# Patient Record
Sex: MALE | Race: BLACK OR AFRICAN AMERICAN | Employment: FULL TIME | URBAN - METROPOLITAN AREA
[De-identification: names, ages, dates, MRNs, and addresses within clinical notes are randomized per-mention and may not be internally consistent; named-entity substitution may affect disease eponyms.]

---

## 2022-02-22 ENCOUNTER — TELEPHONE (OUTPATIENT)
Dept: FAMILY MEDICINE CLINIC | Age: 47
End: 2022-02-22

## 2022-02-22 NOTE — TELEPHONE ENCOUNTER
----- Message from Aristeo Gwen sent at 2/22/2022 11:00 AM EST -----  Subject: Message to Provider    QUESTIONS  Information for Provider? Elmira (Spouse scheduled appt for pt on March   10, 2022 at 1:45, pt is requesting for a blood work to be done before or   during the appt.   ---------------------------------------------------------------------------  --------------  [address]  What is the best way for the office to contact you? OK to leave message on   voicemail  Preferred Call Back Phone Number? [phone]  ---------------------------------------------------------------------------  --------------  SCRIPT ANSWERS  Relationship to Patient? Other  Representative Name? Elmira (Spouse)   Is the Representative on the appropriate HIPAA document in Epic?  Yes

## 2022-02-28 ENCOUNTER — HOSPITAL ENCOUNTER (EMERGENCY)
Age: 47
Discharge: HOME | DRG: 291 | End: 2022-02-28
Payer: COMMERCIAL

## 2022-02-28 ENCOUNTER — HOSPITAL ENCOUNTER (INPATIENT)
Age: 47
LOS: 3 days | Discharge: ANOTHER HOSPITAL | DRG: 291 | End: 2022-03-03
Attending: EMERGENCY MEDICINE | Admitting: INTERNAL MEDICINE
Payer: COMMERCIAL

## 2022-02-28 ENCOUNTER — APPOINTMENT (OUTPATIENT)
Dept: GENERAL RADIOLOGY | Age: 47
DRG: 291 | End: 2022-02-28
Payer: COMMERCIAL

## 2022-02-28 VITALS
SYSTOLIC BLOOD PRESSURE: 215 MMHG | DIASTOLIC BLOOD PRESSURE: 133 MMHG | WEIGHT: 300 LBS | OXYGEN SATURATION: 96 % | RESPIRATION RATE: 20 BRPM | TEMPERATURE: 97.2 F | HEART RATE: 99 BPM | BODY MASS INDEX: 42 KG/M2 | HEIGHT: 71 IN

## 2022-02-28 DIAGNOSIS — I50.9 CONGESTIVE HEART FAILURE, UNSPECIFIED HF CHRONICITY, UNSPECIFIED HEART FAILURE TYPE (HCC): ICD-10-CM

## 2022-02-28 DIAGNOSIS — R35.0 URINARY FREQUENCY: ICD-10-CM

## 2022-02-28 DIAGNOSIS — R03.0 ELEVATED BLOOD PRESSURE READING: Primary | ICD-10-CM

## 2022-02-28 DIAGNOSIS — N28.9 RENAL INSUFFICIENCY: ICD-10-CM

## 2022-02-28 DIAGNOSIS — R06.02 SHORTNESS OF BREATH: ICD-10-CM

## 2022-02-28 DIAGNOSIS — I16.0 HYPERTENSIVE URGENCY: Primary | ICD-10-CM

## 2022-02-28 LAB
ALBUMIN SERPL-MCNC: 4.1 G/DL (ref 3.5–5.2)
ALP BLD-CCNC: 69 U/L (ref 40–129)
ALT SERPL-CCNC: 28 U/L (ref 0–40)
ANION GAP SERPL CALCULATED.3IONS-SCNC: 10 MMOL/L (ref 7–16)
APTT: 27 SEC (ref 24.5–35.1)
AST SERPL-CCNC: 24 U/L (ref 0–39)
BASOPHILS ABSOLUTE: 0.02 E9/L (ref 0–0.2)
BASOPHILS RELATIVE PERCENT: 0.3 % (ref 0–2)
BILIRUB SERPL-MCNC: 0.5 MG/DL (ref 0–1.2)
BILIRUBIN URINE: NEGATIVE
BLOOD, URINE: NEGATIVE
BUN BLDV-MCNC: 14 MG/DL (ref 6–20)
CALCIUM SERPL-MCNC: 9.1 MG/DL (ref 8.6–10.2)
CHLORIDE BLD-SCNC: 103 MMOL/L (ref 98–107)
CLARITY: CLEAR
CO2: 30 MMOL/L (ref 22–29)
COLOR: YELLOW
CREAT SERPL-MCNC: 1.5 MG/DL (ref 0.7–1.2)
EOSINOPHILS ABSOLUTE: 0.12 E9/L (ref 0.05–0.5)
EOSINOPHILS RELATIVE PERCENT: 2 % (ref 0–6)
GFR AFRICAN AMERICAN: >60
GFR NON-AFRICAN AMERICAN: >60 ML/MIN/1.73
GLUCOSE BLD-MCNC: 124 MG/DL (ref 74–99)
GLUCOSE URINE: NEGATIVE MG/DL
HCT VFR BLD CALC: 48.3 % (ref 37–54)
HEMOGLOBIN: 15.8 G/DL (ref 12.5–16.5)
IMMATURE GRANULOCYTES #: 0.02 E9/L
IMMATURE GRANULOCYTES %: 0.3 % (ref 0–5)
INR BLD: 1.2
KETONES, URINE: NEGATIVE MG/DL
LEUKOCYTE ESTERASE, URINE: NEGATIVE
LYMPHOCYTES ABSOLUTE: 1.13 E9/L (ref 1.5–4)
LYMPHOCYTES RELATIVE PERCENT: 18.6 % (ref 20–42)
MAGNESIUM: 2 MG/DL (ref 1.6–2.6)
MCH RBC QN AUTO: 26.8 PG (ref 26–35)
MCHC RBC AUTO-ENTMCNC: 32.7 % (ref 32–34.5)
MCV RBC AUTO: 82 FL (ref 80–99.9)
MONOCYTES ABSOLUTE: 0.66 E9/L (ref 0.1–0.95)
MONOCYTES RELATIVE PERCENT: 10.8 % (ref 2–12)
NEUTROPHILS ABSOLUTE: 4.14 E9/L (ref 1.8–7.3)
NEUTROPHILS RELATIVE PERCENT: 68 % (ref 43–80)
NITRITE, URINE: NEGATIVE
PDW BLD-RTO: 15.9 FL (ref 11.5–15)
PH UA: 6.5 (ref 5–9)
PLATELET # BLD: 255 E9/L (ref 130–450)
PMV BLD AUTO: 10 FL (ref 7–12)
POTASSIUM SERPL-SCNC: 3.4 MMOL/L (ref 3.5–5)
PRO-BNP: 1313 PG/ML (ref 0–125)
PROTEIN UA: NEGATIVE MG/DL
PROTHROMBIN TIME: 13.6 SEC (ref 9.3–12.4)
RBC # BLD: 5.89 E12/L (ref 3.8–5.8)
SODIUM BLD-SCNC: 143 MMOL/L (ref 132–146)
SPECIFIC GRAVITY UA: 1.02 (ref 1–1.03)
TOTAL PROTEIN: 7.3 G/DL (ref 6.4–8.3)
TROPONIN, HIGH SENSITIVITY: 46 NG/L (ref 0–11)
UROBILINOGEN, URINE: 0.2 E.U./DL
WBC # BLD: 6.1 E9/L (ref 4.5–11.5)

## 2022-02-28 PROCEDURE — 2500000003 HC RX 250 WO HCPCS: Performed by: EMERGENCY MEDICINE

## 2022-02-28 PROCEDURE — 85610 PROTHROMBIN TIME: CPT

## 2022-02-28 PROCEDURE — 99202 OFFICE O/P NEW SF 15 MIN: CPT

## 2022-02-28 PROCEDURE — 84484 ASSAY OF TROPONIN QUANT: CPT

## 2022-02-28 PROCEDURE — 93005 ELECTROCARDIOGRAM TRACING: CPT | Performed by: EMERGENCY MEDICINE

## 2022-02-28 PROCEDURE — 99285 EMERGENCY DEPT VISIT HI MDM: CPT

## 2022-02-28 PROCEDURE — 83880 ASSAY OF NATRIURETIC PEPTIDE: CPT

## 2022-02-28 PROCEDURE — 85730 THROMBOPLASTIN TIME PARTIAL: CPT

## 2022-02-28 PROCEDURE — 99223 1ST HOSP IP/OBS HIGH 75: CPT | Performed by: INTERNAL MEDICINE

## 2022-02-28 PROCEDURE — 96375 TX/PRO/DX INJ NEW DRUG ADDON: CPT

## 2022-02-28 PROCEDURE — 85025 COMPLETE CBC W/AUTO DIFF WBC: CPT

## 2022-02-28 PROCEDURE — 6370000000 HC RX 637 (ALT 250 FOR IP): Performed by: INTERNAL MEDICINE

## 2022-02-28 PROCEDURE — 96374 THER/PROPH/DIAG INJ IV PUSH: CPT

## 2022-02-28 PROCEDURE — 71046 X-RAY EXAM CHEST 2 VIEWS: CPT

## 2022-02-28 PROCEDURE — 36415 COLL VENOUS BLD VENIPUNCTURE: CPT

## 2022-02-28 PROCEDURE — 81003 URINALYSIS AUTO W/O SCOPE: CPT

## 2022-02-28 PROCEDURE — 6360000002 HC RX W HCPCS: Performed by: EMERGENCY MEDICINE

## 2022-02-28 PROCEDURE — 83735 ASSAY OF MAGNESIUM: CPT

## 2022-02-28 PROCEDURE — 80053 COMPREHEN METABOLIC PANEL: CPT

## 2022-02-28 PROCEDURE — 6370000000 HC RX 637 (ALT 250 FOR IP): Performed by: EMERGENCY MEDICINE

## 2022-02-28 PROCEDURE — 1200000000 HC SEMI PRIVATE

## 2022-02-28 RX ORDER — SODIUM CHLORIDE 0.9 % (FLUSH) 0.9 %
10 SYRINGE (ML) INJECTION PRN
Status: DISCONTINUED | OUTPATIENT
Start: 2022-02-28 | End: 2022-03-03 | Stop reason: HOSPADM

## 2022-02-28 RX ORDER — LABETALOL HYDROCHLORIDE 5 MG/ML
10 INJECTION, SOLUTION INTRAVENOUS ONCE
Status: COMPLETED | OUTPATIENT
Start: 2022-02-28 | End: 2022-02-28

## 2022-02-28 RX ORDER — ACETAMINOPHEN 650 MG/1
650 SUPPOSITORY RECTAL EVERY 6 HOURS PRN
Status: DISCONTINUED | OUTPATIENT
Start: 2022-02-28 | End: 2022-03-03 | Stop reason: HOSPADM

## 2022-02-28 RX ORDER — FUROSEMIDE 10 MG/ML
40 INJECTION INTRAMUSCULAR; INTRAVENOUS ONCE
Status: COMPLETED | OUTPATIENT
Start: 2022-02-28 | End: 2022-02-28

## 2022-02-28 RX ORDER — PROMETHAZINE HYDROCHLORIDE 25 MG/1
12.5 TABLET ORAL EVERY 6 HOURS PRN
Status: DISCONTINUED | OUTPATIENT
Start: 2022-02-28 | End: 2022-03-03 | Stop reason: HOSPADM

## 2022-02-28 RX ORDER — HYDRALAZINE HYDROCHLORIDE 20 MG/ML
10 INJECTION INTRAMUSCULAR; INTRAVENOUS ONCE
Status: COMPLETED | OUTPATIENT
Start: 2022-02-28 | End: 2022-02-28

## 2022-02-28 RX ORDER — ONDANSETRON 2 MG/ML
4 INJECTION INTRAMUSCULAR; INTRAVENOUS EVERY 6 HOURS PRN
Status: DISCONTINUED | OUTPATIENT
Start: 2022-02-28 | End: 2022-03-03 | Stop reason: HOSPADM

## 2022-02-28 RX ORDER — POLYETHYLENE GLYCOL 3350 17 G/17G
17 POWDER, FOR SOLUTION ORAL DAILY PRN
Status: DISCONTINUED | OUTPATIENT
Start: 2022-02-28 | End: 2022-03-03 | Stop reason: HOSPADM

## 2022-02-28 RX ORDER — CLONIDINE HYDROCHLORIDE 0.2 MG/1
0.2 TABLET ORAL EVERY 8 HOURS PRN
Status: DISCONTINUED | OUTPATIENT
Start: 2022-02-28 | End: 2022-03-02

## 2022-02-28 RX ORDER — SODIUM CHLORIDE 9 MG/ML
25 INJECTION, SOLUTION INTRAVENOUS PRN
Status: DISCONTINUED | OUTPATIENT
Start: 2022-02-28 | End: 2022-03-03 | Stop reason: HOSPADM

## 2022-02-28 RX ORDER — HYDRALAZINE HYDROCHLORIDE 20 MG/ML
20 INJECTION INTRAMUSCULAR; INTRAVENOUS EVERY 6 HOURS PRN
Status: DISCONTINUED | OUTPATIENT
Start: 2022-02-28 | End: 2022-03-03 | Stop reason: HOSPADM

## 2022-02-28 RX ORDER — AMLODIPINE BESYLATE 5 MG/1
10 TABLET ORAL DAILY
Status: DISCONTINUED | OUTPATIENT
Start: 2022-02-28 | End: 2022-03-01

## 2022-02-28 RX ORDER — SODIUM CHLORIDE 0.9 % (FLUSH) 0.9 %
10 SYRINGE (ML) INJECTION EVERY 12 HOURS SCHEDULED
Status: DISCONTINUED | OUTPATIENT
Start: 2022-02-28 | End: 2022-03-03 | Stop reason: HOSPADM

## 2022-02-28 RX ORDER — ACETAMINOPHEN 325 MG/1
650 TABLET ORAL EVERY 6 HOURS PRN
Status: DISCONTINUED | OUTPATIENT
Start: 2022-02-28 | End: 2022-03-03 | Stop reason: HOSPADM

## 2022-02-28 RX ADMIN — LABETALOL HYDROCHLORIDE 10 MG: 5 INJECTION INTRAVENOUS at 21:32

## 2022-02-28 RX ADMIN — NITROGLYCERIN 1 INCH: 20 OINTMENT TOPICAL at 23:57

## 2022-02-28 RX ADMIN — HYDRALAZINE HYDROCHLORIDE 10 MG: 20 INJECTION INTRAMUSCULAR; INTRAVENOUS at 22:11

## 2022-02-28 RX ADMIN — FUROSEMIDE 40 MG: 10 INJECTION, SOLUTION INTRAMUSCULAR; INTRAVENOUS at 23:57

## 2022-02-28 RX ADMIN — AMLODIPINE BESYLATE 10 MG: 5 TABLET ORAL at 23:57

## 2022-02-28 ASSESSMENT — ENCOUNTER SYMPTOMS
BACK PAIN: 0
SHORTNESS OF BREATH: 1
WHEEZING: 0
NAUSEA: 0
COUGH: 1
CHEST TIGHTNESS: 0
VOMITING: 0
SINUS PRESSURE: 0
DIARRHEA: 0
ABDOMINAL PAIN: 0
SORE THROAT: 0

## 2022-02-28 ASSESSMENT — PAIN DESCRIPTION - DESCRIPTORS: DESCRIPTORS: OTHER (COMMENT)

## 2022-02-28 ASSESSMENT — PAIN DESCRIPTION - FREQUENCY: FREQUENCY: CONTINUOUS

## 2022-02-28 ASSESSMENT — PAIN DESCRIPTION - LOCATION: LOCATION: ABDOMEN

## 2022-02-28 ASSESSMENT — PAIN SCALES - GENERAL: PAINLEVEL_OUTOF10: 4

## 2022-02-28 ASSESSMENT — PAIN - FUNCTIONAL ASSESSMENT: PAIN_FUNCTIONAL_ASSESSMENT: 0-10

## 2022-02-28 ASSESSMENT — PAIN DESCRIPTION - PAIN TYPE: TYPE: ACUTE PAIN

## 2022-02-28 ASSESSMENT — PAIN DESCRIPTION - ONSET: ONSET: GRADUAL

## 2022-02-28 ASSESSMENT — PAIN DESCRIPTION - PROGRESSION: CLINICAL_PROGRESSION: NOT CHANGED

## 2022-03-01 PROBLEM — I10 HYPERTENSION: Status: ACTIVE | Noted: 2022-03-01

## 2022-03-01 PROBLEM — I50.41 ACUTE COMBINED SYSTOLIC AND DIASTOLIC CONGESTIVE HEART FAILURE (HCC): Status: ACTIVE | Noted: 2022-03-01

## 2022-03-01 LAB
ALBUMIN SERPL-MCNC: 4 G/DL (ref 3.5–5.2)
ALP BLD-CCNC: 58 U/L (ref 40–129)
ALT SERPL-CCNC: 29 U/L (ref 0–40)
ANION GAP SERPL CALCULATED.3IONS-SCNC: 9 MMOL/L (ref 7–16)
AST SERPL-CCNC: 47 U/L (ref 0–39)
BASOPHILS ABSOLUTE: 0.02 E9/L (ref 0–0.2)
BASOPHILS RELATIVE PERCENT: 0.4 % (ref 0–2)
BILIRUB SERPL-MCNC: 0.8 MG/DL (ref 0–1.2)
BUN BLDV-MCNC: 12 MG/DL (ref 6–20)
CALCIUM SERPL-MCNC: 9 MG/DL (ref 8.6–10.2)
CHLORIDE BLD-SCNC: 100 MMOL/L (ref 98–107)
CO2: 31 MMOL/L (ref 22–29)
CREAT SERPL-MCNC: 1.2 MG/DL (ref 0.7–1.2)
EKG ATRIAL RATE: 87 BPM
EKG P AXIS: 42 DEGREES
EKG P-R INTERVAL: 208 MS
EKG Q-T INTERVAL: 400 MS
EKG QRS DURATION: 98 MS
EKG QTC CALCULATION (BAZETT): 481 MS
EKG R AXIS: 70 DEGREES
EKG T AXIS: 11 DEGREES
EKG VENTRICULAR RATE: 87 BPM
EOSINOPHILS ABSOLUTE: 0.12 E9/L (ref 0.05–0.5)
EOSINOPHILS RELATIVE PERCENT: 2.4 % (ref 0–6)
GFR AFRICAN AMERICAN: >60
GFR NON-AFRICAN AMERICAN: >60 ML/MIN/1.73
GLUCOSE BLD-MCNC: 118 MG/DL (ref 74–99)
HCT VFR BLD CALC: 47.5 % (ref 37–54)
HEMOGLOBIN: 15.5 G/DL (ref 12.5–16.5)
IMMATURE GRANULOCYTES #: 0.02 E9/L
IMMATURE GRANULOCYTES %: 0.4 % (ref 0–5)
LYMPHOCYTES ABSOLUTE: 0.77 E9/L (ref 1.5–4)
LYMPHOCYTES RELATIVE PERCENT: 15.7 % (ref 20–42)
MCH RBC QN AUTO: 27 PG (ref 26–35)
MCHC RBC AUTO-ENTMCNC: 32.6 % (ref 32–34.5)
MCV RBC AUTO: 82.6 FL (ref 80–99.9)
MONOCYTES ABSOLUTE: 0.56 E9/L (ref 0.1–0.95)
MONOCYTES RELATIVE PERCENT: 11.4 % (ref 2–12)
NEUTROPHILS ABSOLUTE: 3.42 E9/L (ref 1.8–7.3)
NEUTROPHILS RELATIVE PERCENT: 69.7 % (ref 43–80)
PDW BLD-RTO: 15.9 FL (ref 11.5–15)
PLATELET # BLD: 268 E9/L (ref 130–450)
PMV BLD AUTO: 10.3 FL (ref 7–12)
POTASSIUM REFLEX MAGNESIUM: 4.2 MMOL/L (ref 3.5–5)
RBC # BLD: 5.75 E12/L (ref 3.8–5.8)
SODIUM BLD-SCNC: 140 MMOL/L (ref 132–146)
TOTAL PROTEIN: 7.2 G/DL (ref 6.4–8.3)
TROPONIN, HIGH SENSITIVITY: 42 NG/L (ref 0–11)
WBC # BLD: 4.9 E9/L (ref 4.5–11.5)

## 2022-03-01 PROCEDURE — 99232 SBSQ HOSP IP/OBS MODERATE 35: CPT | Performed by: INTERNAL MEDICINE

## 2022-03-01 PROCEDURE — 84484 ASSAY OF TROPONIN QUANT: CPT

## 2022-03-01 PROCEDURE — 6370000000 HC RX 637 (ALT 250 FOR IP): Performed by: INTERNAL MEDICINE

## 2022-03-01 PROCEDURE — 1200000000 HC SEMI PRIVATE

## 2022-03-01 PROCEDURE — 6360000002 HC RX W HCPCS: Performed by: INTERNAL MEDICINE

## 2022-03-01 PROCEDURE — APPSS60 APP SPLIT SHARED TIME 46-60 MINUTES: Performed by: PHYSICIAN ASSISTANT

## 2022-03-01 PROCEDURE — 2580000003 HC RX 258: Performed by: INTERNAL MEDICINE

## 2022-03-01 PROCEDURE — 36415 COLL VENOUS BLD VENIPUNCTURE: CPT

## 2022-03-01 PROCEDURE — 80053 COMPREHEN METABOLIC PANEL: CPT

## 2022-03-01 PROCEDURE — 93018 CV STRESS TEST I&R ONLY: CPT | Performed by: INTERNAL MEDICINE

## 2022-03-01 PROCEDURE — 6360000002 HC RX W HCPCS: Performed by: PHYSICIAN ASSISTANT

## 2022-03-01 PROCEDURE — 85025 COMPLETE CBC W/AUTO DIFF WBC: CPT

## 2022-03-01 PROCEDURE — 99254 IP/OBS CNSLTJ NEW/EST MOD 60: CPT | Performed by: INTERNAL MEDICINE

## 2022-03-01 RX ORDER — CARVEDILOL 6.25 MG/1
6.25 TABLET ORAL 2 TIMES DAILY WITH MEALS
Status: DISCONTINUED | OUTPATIENT
Start: 2022-03-01 | End: 2022-03-02

## 2022-03-01 RX ORDER — NITROGLYCERIN 0.4 MG/1
0.4 TABLET SUBLINGUAL EVERY 5 MIN PRN
Status: DISCONTINUED | OUTPATIENT
Start: 2022-03-01 | End: 2022-03-03 | Stop reason: HOSPADM

## 2022-03-01 RX ORDER — LISINOPRIL 5 MG/1
2.5 TABLET ORAL DAILY
Status: DISCONTINUED | OUTPATIENT
Start: 2022-03-01 | End: 2022-03-01

## 2022-03-01 RX ORDER — AMLODIPINE BESYLATE 5 MG/1
5 TABLET ORAL DAILY
Status: DISCONTINUED | OUTPATIENT
Start: 2022-03-01 | End: 2022-03-02

## 2022-03-01 RX ADMIN — Medication 10 ML: at 09:00

## 2022-03-01 RX ADMIN — AMLODIPINE BESYLATE 5 MG: 5 TABLET ORAL at 08:32

## 2022-03-01 RX ADMIN — ENOXAPARIN SODIUM 30 MG: 100 INJECTION SUBCUTANEOUS at 08:31

## 2022-03-01 RX ADMIN — FUROSEMIDE 20 MG: 10 INJECTION, SOLUTION INTRAMUSCULAR; INTRAVENOUS at 20:30

## 2022-03-01 RX ADMIN — CARVEDILOL 6.25 MG: 6.25 TABLET, FILM COATED ORAL at 12:49

## 2022-03-01 RX ADMIN — LISINOPRIL 2.5 MG: 5 TABLET ORAL at 08:32

## 2022-03-01 RX ADMIN — CARVEDILOL 6.25 MG: 6.25 TABLET, FILM COATED ORAL at 16:46

## 2022-03-01 RX ADMIN — FUROSEMIDE 40 MG: 10 INJECTION, SOLUTION INTRAMUSCULAR; INTRAVENOUS at 08:31

## 2022-03-01 RX ADMIN — Medication 10 ML: at 20:30

## 2022-03-01 RX ADMIN — ENOXAPARIN SODIUM 40 MG: 100 INJECTION SUBCUTANEOUS at 20:30

## 2022-03-01 RX ADMIN — Medication 10 ML: at 00:03

## 2022-03-01 RX ADMIN — HYDRALAZINE HYDROCHLORIDE 20 MG: 20 INJECTION INTRAMUSCULAR; INTRAVENOUS at 00:57

## 2022-03-01 ASSESSMENT — PAIN SCALES - GENERAL: PAINLEVEL_OUTOF10: 0

## 2022-03-01 NOTE — PROGRESS NOTES
Dr. Aileen Fitch MD,    Your patient is on a medication that requires a renal and/or weight dose adjustment. Renal/Body Weight Function Assessment:    Date Body Weight IBW  Adjusted BW SCr  CrCl Dialysis status   2/28/2022 136.1 kg Ideal body weight: 77.6 kg (171 lb 1.2 oz)  Adjusted ideal body weight: 101 kg (222 lb 10.3 oz) Serum creatinine: 1.5 mg/dL (H) 02/28/22 2111  Estimated creatinine clearance: 88 mL/min (A) N/a       Pharmacy has dose-adjusted the following medication(s):    Date Previous Order Adjusted Order   2/28/2022 Lovenox 40 mg Daily Lovenox 30 mg BID       These changes were made per protocol according to the [address] Renal Dosing Policy/ [address] Pharmacist Anticoagulant Review. *Please note this dose may need readjusted if your patient's condition changes. Please contact pharmacy with any questions regarding these changes.     Jelani Shaw, [facility]  2/28/2022  10:56 PM

## 2022-03-01 NOTE — ED PROVIDER NOTES
Chief complaint:  Hypertension, short of breath    HPI history provided by the patient and significant other  The patient comes in complaining of shortness of breath worsened with exertion for days to weeks. No associated chest pain or palpitations. No fevers, sweats or chills. Mild intermittent cough. No phlegm production. No runny nose or nasal congestion. No fevers, chills or sweats and no abdominal pain, nausea or vomiting or diarrhea. Having some mild general leg edema. No unilateral leg swelling and no calf pain. No lightheadedness or syncope. No treatment prior to arrival.  Has a history of hypertension in the past, was on medications and at one point over a decade ago had his blood pressure medications stopped and states that he thought his blood pressure had stayed down afterwards but never actually followed up with a physician following this so he has not had blood pressure checks for the last decade or longer. Has not followed with a doctor since the early 2000's. Review of Systems   Constitutional: Negative for chills, diaphoresis, fatigue and fever. HENT: Negative for congestion, sinus pressure and sore throat. Respiratory: Positive for cough and shortness of breath. Negative for chest tightness and wheezing. Cardiovascular: Positive for leg swelling. Negative for chest pain and palpitations. Gastrointestinal: Negative for abdominal pain, diarrhea, nausea and vomiting. Genitourinary: Negative for dysuria, flank pain and frequency. Musculoskeletal: Negative for arthralgias, back pain, gait problem, joint swelling, myalgias, neck pain and neck stiffness. Skin: Negative for rash and wound. Neurological: Negative for dizziness, seizures, syncope, facial asymmetry, weakness, light-headedness, numbness and headaches. All other systems reviewed and are negative. Physical Exam  Vitals and nursing note reviewed. Constitutional:       General: He is awake.  He is not in acute distress. Appearance: He is well-developed. He is morbidly obese. He is not ill-appearing, toxic-appearing or diaphoretic. Interventions: He is not intubated. HENT:      Head: Normocephalic and atraumatic. Eyes:      General: No scleral icterus. Pupils: Pupils are equal, round, and reactive to light. Cardiovascular:      Rate and Rhythm: Normal rate and regular rhythm. Heart sounds: Normal heart sounds. No murmur heard. Pulmonary:      Effort: Pulmonary effort is normal. No tachypnea, accessory muscle usage or respiratory distress. He is not intubated. Breath sounds: Normal breath sounds. No stridor, decreased air movement or transmitted upper airway sounds. No decreased breath sounds, wheezing, rhonchi or rales. Chest:      Chest wall: No tenderness. Abdominal:      General: Bowel sounds are normal. There is no distension. Palpations: Abdomen is soft. Tenderness: There is no abdominal tenderness. There is no right CVA tenderness, left CVA tenderness, guarding or rebound. Musculoskeletal:      Cervical back: Normal range of motion and neck supple. No signs of trauma or rigidity. No pain with movement, spinous process tenderness or muscular tenderness. Normal range of motion. Right lower leg: No tenderness. Edema present. Left lower leg: No tenderness. Edema present. Comments: +1-2 general diffuse lower extremity edema bilaterally no unilateral swelling and no calf pain. Arms and legs are neurovascular intact. Some mild stasis dermatitis in the pretibial regions bilaterally. Skin:     General: Skin is warm and dry. Coloration: Skin is not cyanotic, jaundiced, mottled or pale. Findings: No erythema. Nails: There is no clubbing. Neurological:      General: No focal deficit present. Mental Status: He is alert and oriented to person, place, and time. GCS: GCS eye subscore is 4. GCS verbal subscore is 5.  GCS motor subscore is 6.      Cranial Nerves: No cranial nerve deficit. Coordination: Coordination normal.   Psychiatric:         Behavior: Behavior is cooperative. Procedures     [facility]     ED Course as of 02/28/22 2251 Mon Feb 28, 2022 2129 Patient resting comfortably in the bed in no distress. Blood pressure slowly improving. [NC]   0505 Recheck on patient, blood pressure crept up systolic but his diastolic is remained fairly consistent. He is in no respiratory distress and is updated on work-up results. His exam is pretty much unchanged otherwise. [NC]   2250 Case discussed with Dr. Juan R Alston, detailed overview given, he will admit the patient. [NC]      ED Course User Index  [NC] 1150 [facility], DO     EKG Interpretation    Interpreted by emergency department physician    Rhythm: normal sinus   Rate: 87  Axis: normal  Ectopy: none  Conduction: normal and QTc 481  ST Segments: no acute change  T Waves: no acute change  Q Waves: none    Clinical Impression: no acute changes    1150 [facility], DO       ED Course as of 02/28/22 2251 Mon Feb 28, 2022 2129 Patient resting comfortably in the bed in no distress. Blood pressure slowly improving. [NC]   8165 Recheck on patient, blood pressure crept up systolic but his diastolic is remained fairly consistent. He is in no respiratory distress and is updated on work-up results. His exam is pretty much unchanged otherwise. [NC]   2250 Case discussed with Dr. Juan R Alston, detailed overview given, he will admit the patient. [NC]      ED Course User Index  [NC] 2440 [facility], DO       --------------------------------------------- PAST HISTORY ---------------------------------------------  Past Medical History:  has no past medical history on file. Past Surgical History:  has a past surgical history that includes Foot surgery; fracture surgery; and Appendectomy. Social History:  reports that he has never smoked.  He has never used smokeless tobacco.    Family History: family history is not on file. The patients home medications have been reviewed. Allergies: Patient has no known allergies.     -------------------------------------------------- RESULTS -------------------------------------------------    Lab  Results for orders placed or performed during the hospital encounter of 02/28/22   CBC with Auto Differential   Result Value Ref Range    WBC 6.1 4.5 - 11.5 E9/L    RBC 5.89 (H) 3.80 - 5.80 E12/L    Hemoglobin 15.8 12.5 - 16.5 g/dL    Hematocrit 48.3 37.0 - 54.0 %    MCV 82.0 80.0 - 99.9 fL    MCH 26.8 26.0 - 35.0 pg    MCHC 32.7 32.0 - 34.5 %    RDW 15.9 (H) 11.5 - 15.0 fL    Platelets 014 818 - 149 E9/L    MPV 10.0 7.0 - 12.0 fL    Neutrophils % 68.0 43.0 - 80.0 %    Immature Granulocytes % 0.3 0.0 - 5.0 %    Lymphocytes % 18.6 (L) 20.0 - 42.0 %    Monocytes % 10.8 2.0 - 12.0 %    Eosinophils % 2.0 0.0 - 6.0 %    Basophils % 0.3 0.0 - 2.0 %    Neutrophils Absolute 4.14 1.80 - 7.30 E9/L    Immature Granulocytes # 0.02 E9/L    Lymphocytes Absolute 1.13 (L) 1.50 - 4.00 E9/L    Monocytes Absolute 0.66 0.10 - 0.95 E9/L    Eosinophils Absolute 0.12 0.05 - 0.50 E9/L    Basophils Absolute 0.02 0.00 - 0.20 E9/L   Comprehensive Metabolic Panel   Result Value Ref Range    Sodium 143 132 - 146 mmol/L    Potassium 3.4 (L) 3.5 - 5.0 mmol/L    Chloride 103 98 - 107 mmol/L    CO2 30 (H) 22 - 29 mmol/L    Anion Gap 10 7 - 16 mmol/L    Glucose 124 (H) 74 - 99 mg/dL    BUN 14 6 - 20 mg/dL    CREATININE 1.5 (H) 0.7 - 1.2 mg/dL    GFR Non-African American >60 >=60 mL/min/1.73    GFR African American >60     Calcium 9.1 8.6 - 10.2 mg/dL    Total Protein 7.3 6.4 - 8.3 g/dL    Albumin 4.1 3.5 - 5.2 g/dL    Total Bilirubin 0.5 0.0 - 1.2 mg/dL    Alkaline Phosphatase 69 40 - 129 U/L    ALT 28 0 - 40 U/L    AST 24 0 - 39 U/L   Protime-INR   Result Value Ref Range    Protime 13.6 (H) 9.3 - 12.4 sec    INR 1.2    APTT   Result Value Ref Range    aPTT 27.0 24.5 - 35.1 sec   Brain Natriuretic Peptide   Result Value Ref Range    Pro-BNP 1,313 (H) 0 - 125 pg/mL   Magnesium   Result Value Ref Range    Magnesium 2.0 1.6 - 2.6 mg/dL   Troponin   Result Value Ref Range    Troponin, High Sensitivity 46 (H) 0 - 11 ng/L   Urinalysis   Result Value Ref Range    Color, UA Yellow Straw/Yellow    Clarity, UA Clear Clear    Glucose, Ur Negative Negative mg/dL    Bilirubin Urine Negative Negative    Ketones, Urine Negative Negative mg/dL    Specific Gravity, UA 1.020 1.005 - 1.030    Blood, Urine Negative Negative    pH, UA 6.5 5.0 - 9.0    Protein, UA Negative Negative mg/dL    Urobilinogen, Urine 0.2 <2.0 E.U./dL    Nitrite, Urine Negative Negative    Leukocyte Esterase, Urine Negative Negative   EKG 12 Lead   Result Value Ref Range    Ventricular Rate 87 BPM    Atrial Rate 87 BPM    P-R Interval 208 ms    QRS Duration 98 ms    Q-T Interval 400 ms    QTc Calculation (Bazett) 481 ms    P Axis 42 degrees    R Axis 70 degrees    T Axis 11 degrees       Radiology  XR CHEST (2 VW)   Final Result   Atherosclerotic disease and cardiomegaly. Central interstitial edema. ------------------------- NURSING NOTES AND VITALS REVIEWED ---------------------------  Date / Time Roomed:  2/28/2022  8:30 PM  ED Bed Assignment:  14/14    The nursing notes within the ED encounter and vital signs as below have been reviewed.    Patient Vitals for the past 24 hrs:   BP Temp Pulse Resp SpO2 Height Weight   02/28/22 2230 (!) 207/144 -- -- -- -- -- --   02/28/22 2200 (!) 187/139 -- 79 18 93 % -- --   02/28/22 2145 (!) 178/134 -- 76 20 95 % -- --   02/28/22 2115 (!) 198/133 -- 90 22 96 % -- --   02/28/22 1955 (!) 254/141 -- -- -- -- -- --   02/28/22 1944 -- 96.8 °F (36 °C) 108 26 95 % 6' (1.829 m) 300 lb (136.1 kg)       Oxygen Saturation Interpretation: Normal          I have spoken with the patient and discussed todays results, in addition to providing specific details for the plan of care and counseling regarding the diagnosis and prognosis. Their questions are answered at this time and they are agreeable with the plan. This patient's ED course included: a personal history and physicial examination, re-evaluation prior to disposition, multiple bedside re-evaluations, IV medications, cardiac monitoring, continuous pulse oximetry and complex medical decision making and emergency management    This patient has remained hemodynamically stable, improved and been closely monitored during their ED course. Please note that the withdrawal or failure to initiate urgent interventions for this patient would likely result in a life threatening deterioration or permanent disability. Accordingly this patient received 32 minutes of critical care time, excluding separately billable procedures. Systems at risk for deterioration include: cardiopulmonary. Clinical Impression  1. Hypertensive urgency    2. Congestive heart failure, unspecified HF chronicity, unspecified heart failure type ([address]    3. Renal insufficiency          Disposition  Patient's disposition: Admit to telemetry  Patient's condition is stable.          Alex Real DO  02/28/22 1462

## 2022-03-01 NOTE — H&P
[address] [place] [address]   HISTORY AND PHYSICAL EXAM      AUTHOR: Aniket Ca MD PATIENT NAME: Lorin Salazar   DATE: 2022 MRN: [id number], : 1975   Primary Care Physician: No primary care provider on file. CHIEF COMPLAINT / REASON FOR ADMISSION:  Shortness of Breath, Cough, Leg edema    HPI:   This is a 55 y.o. male  has a past medical history of Acute combined systolic and diastolic congestive heart failure (HCC) and Hypertension. presented with Shortness of Breath, Cough, Leg edema for last few days prior to arrival to the hospital. SOB is associated with progressive leg swelling. Initially SOB was mild, intermittent but gradually getting more persistent. Started gradually. Exacerbated by general activity or exertion. Relieved only partially by rest.  The patient was seen and examined at bedside, appears alert and awake with no acute distress and is able to answer simple  questions. On direct questioning, patient denied any  resting ongoing chest pain, hemoptysis, productive cough, fever, ongoing palpitation, active abdominal pain, hematemesis, rectal bleeding, vimal, hematuria, any other  and GI complaints and any new focal neuro deficits. ROS:  Pertinent positives and negatives are noted in the HPI, all other systems are reviewed and negative    PMH:  Past Medical History:   Diagnosis Date    Acute combined systolic and diastolic congestive heart failure ([facility] [address] 3/1/2022    Hypertension        Surgical History:  Past Surgical History:   Procedure Laterality Date    APPENDECTOMY      FOOT SURGERY      FRACTURE SURGERY         Medications Prior to Admission:    Prior to Admission medications    Not on File       Allergies:    Patient has no known allergies. Social History:    reports that he has never smoked. He has never used smokeless tobacco. He reports that he does not drink alcohol and does not use drugs.       Family History:   family history is not on file. PHYSICAL EXAM:  Vitals:  BP (!) 175/74   Pulse 90   Temp 98.7 °F (37.1 °C) (Oral)   Resp 18   Ht 6' (1.829 m)   Wt 300 lb (136.1 kg)   SpO2 94%   BMI 40.69 kg/m²   GENERAL: No acute distress, Alert and awake, Afebrile, Appears tired and weak otherwise hemodynamically stable at present. HEENT: PERRLA, no icterus. OP clear and no exudates. NECK: Supple  no carotid/ophthalmic bruits, JVD None. RESPIRATORY:  Bilateral equal vesicular with prolonged expiration breath sound with diffused bilateral expiratory wheezing. Lung bases are clear. HEART: No tachycardia at bedside and regular rhythm. Normal S1 and S2, No S3 or S4 is audible. No pulsation, thrills, murmur or friction rubs. ABDOMEN: Soft, nondistended, nontender. No hepatomegaly or splenomegaly. No CVA tenderness on the both sides. Bowel sound is present. EXTREMITIES: All peripheral pulses are present. No calf tenderness or swelling. Significant(++) pedal edema is present. Loreta Sinha NEUROLOGY: Alert and awake. No new focal neuro deficit. Bilateral Pupil is equal and reactive to light. CN-ii-xii otherwise grossly intact. Motor and Sensory: Grossly Intact bilaterally with no new focal signs     LABS:  Recent Labs     02/28/22 2111   WBC 6.1   RBC 5.89*   HGB 15.8   HCT 48.3   MCV 82.0   MCH 26.8   MCHC 32.7   RDW 15.9*      MPV 10.0     Recent Labs     02/28/22 2111      K 3.4*      CO2 30*   BUN 14   CREATININE 1.5*   GLUCOSE 124*   CALCIUM 9.1     No results for input(s): POCGLU in the last 72 hours.   Results for orders placed or performed during the hospital encounter of 02/28/22   CBC with Auto Differential   Result Value Ref Range    WBC 6.1 4.5 - 11.5 E9/L    RBC 5.89 (H) 3.80 - 5.80 E12/L    Hemoglobin 15.8 12.5 - 16.5 g/dL    Hematocrit 48.3 37.0 - 54.0 %    MCV 82.0 80.0 - 99.9 fL    MCH 26.8 26.0 - 35.0 pg    MCHC 32.7 32.0 - 34.5 %    RDW 15.9 (H) 11.5 - 15.0 fL    Platelets 411 827 - 245 E9/L    MPV 10.0 7.0 - 12.0 fL    Neutrophils % 68.0 43.0 - 80.0 %    Immature Granulocytes % 0.3 0.0 - 5.0 %    Lymphocytes % 18.6 (L) 20.0 - 42.0 %    Monocytes % 10.8 2.0 - 12.0 %    Eosinophils % 2.0 0.0 - 6.0 %    Basophils % 0.3 0.0 - 2.0 %    Neutrophils Absolute 4.14 1.80 - 7.30 E9/L    Immature Granulocytes # 0.02 E9/L    Lymphocytes Absolute 1.13 (L) 1.50 - 4.00 E9/L    Monocytes Absolute 0.66 0.10 - 0.95 E9/L    Eosinophils Absolute 0.12 0.05 - 0.50 E9/L    Basophils Absolute 0.02 0.00 - 0.20 E9/L   Comprehensive Metabolic Panel   Result Value Ref Range    Sodium 143 132 - 146 mmol/L    Potassium 3.4 (L) 3.5 - 5.0 mmol/L    Chloride 103 98 - 107 mmol/L    CO2 30 (H) 22 - 29 mmol/L    Anion Gap 10 7 - 16 mmol/L    Glucose 124 (H) 74 - 99 mg/dL    BUN 14 6 - 20 mg/dL    CREATININE 1.5 (H) 0.7 - 1.2 mg/dL    GFR Non-African American >60 >=60 mL/min/1.73    GFR African American >60     Calcium 9.1 8.6 - 10.2 mg/dL    Total Protein 7.3 6.4 - 8.3 g/dL    Albumin 4.1 3.5 - 5.2 g/dL    Total Bilirubin 0.5 0.0 - 1.2 mg/dL    Alkaline Phosphatase 69 40 - 129 U/L    ALT 28 0 - 40 U/L    AST 24 0 - 39 U/L   Protime-INR   Result Value Ref Range    Protime 13.6 (H) 9.3 - 12.4 sec    INR 1.2    APTT   Result Value Ref Range    aPTT 27.0 24.5 - 35.1 sec   Brain Natriuretic Peptide   Result Value Ref Range    Pro-BNP 1,313 (H) 0 - 125 pg/mL   Magnesium   Result Value Ref Range    Magnesium 2.0 1.6 - 2.6 mg/dL   Troponin   Result Value Ref Range    Troponin, High Sensitivity 46 (H) 0 - 11 ng/L   Urinalysis   Result Value Ref Range    Color, UA Yellow Straw/Yellow    Clarity, UA Clear Clear    Glucose, Ur Negative Negative mg/dL    Bilirubin Urine Negative Negative    Ketones, Urine Negative Negative mg/dL    Specific Gravity, UA 1.020 1.005 - 1.030    Blood, Urine Negative Negative    pH, UA 6.5 5.0 - 9.0    Protein, UA Negative Negative mg/dL    Urobilinogen, Urine 0.2 <2.0 E.U./dL    Nitrite, Urine Negative Negative    Leukocyte Esterase, Urine Negative Negative   Troponin   Result Value Ref Range    Troponin, High Sensitivity 42 (H) 0 - 11 ng/L   EKG 12 Lead   Result Value Ref Range    Ventricular Rate 87 BPM    Atrial Rate 87 BPM    P-R Interval 208 ms    QRS Duration 98 ms    Q-T Interval 400 ms    QTc Calculation (Bazett) 481 ms    P Axis 42 degrees    R Axis 70 degrees    T Axis 11 degrees     ED Course as of 03/01/22 0601   Mon Feb 28, 2022 2129 Patient resting comfortably in the bed in no distress. Blood pressure slowly improving. [NC]   5316 Recheck on patient, blood pressure crept up systolic but his diastolic is remained fairly consistent. He is in no respiratory distress and is updated on work-up results. His exam is pretty much unchanged otherwise. [NC]   2250 Case discussed with Dr. Teresa Spurling, detailed overview given, he will admit the patient. [NC]      ED Course User Index  [NC] Tami Steele DO     Radiology: XR CHEST (2 VW)    Result Date: 2/28/2022  EXAMINATION: TWO XRAY VIEWS OF THE CHEST 2/28/2022 10:16 pm COMPARISON: None. HISTORY: ORDERING SYSTEM PROVIDED HISTORY: SOB, HTN TECHNOLOGIST PROVIDED HISTORY: Reason for exam:->SOB, HTN FINDINGS: Adequate and symmetric aeration of the lungs. There are no formed consolidations, pleural effusions, or pneumothoraces. Trachea and central mainstem bronchi appear clear. Atherosclerotic disease and cardiomegaly. Central interstitial edema. Osseous and thoracic soft tissue structures demonstrate no acute findings. Atherosclerotic disease and cardiomegaly. Central interstitial edema.      ASSESSMENT:    Present on Admission:   Hypertensive urgency   Hypertension   Acute combined systolic and diastolic congestive heart failure (HCC)    PLAN:  # Acute CHF + Leg edema + pul edema  Fluid restriction ~1200 ml/day  S/p IV Lasix and continue IV lasix  Strict I/O and daily weight  Will adjust and continue home medications as needed   Echo and cardiology consult  # Hypertension

## 2022-03-01 NOTE — PROGRESS NOTES
Dr. Landon Saldaña MD,    Your patient is on a medication that requires a renal dose adjustment. Renal Function Assessment:    Date Body Weight IBW  Adjusted BW SCr  CrCl Dialysis status   3/1/2022 (!) 381 lb 12.8 oz (173.2 kg)  Ideal body weight: 77.6 kg (171 lb 1.2 oz)  Adjusted ideal body weight: 115.8 kg (255 lb 5.9 oz) Serum creatinine: 1.2 mg/dL 03/01/22 0604  Estimated creatinine clearance: 126 mL/min N/a       Pharmacy has renally dose-adjusted the following medication(s):    Date Original Order Renally Adjusted Order   3/1/2022 Lovenox 30mg BID Lovenox 40mg BID       These changes were made per protocol according to the [facility] Renal Function-Based Dose Adjustments Policy    *Please note this dose may need readjusted if your patient's renal function significantly improves. Please contact pharmacy with any questions regarding these changes.     KO [facility]  3/1/2022  1:20 PM

## 2022-03-01 NOTE — ED PROVIDER NOTES
Department of Emergency [address] [facility]  Provider Note  Admit Date/Time: 2022  7:02 PM  Room:   NAME: Liliana Vergara  : 1975  MRN: [id number]     Chief Complaint:  Abdominal Pain (States he feels bloated.), Urinary Frequency, and Shortness of Breath    History of Present Illness        Liliana Vergara is a 55 y.o. male who has a past medical history of: History reviewed. No pertinent past medical history. presents to the urgent care center by private car for evaluation. He has been having trouble since Saturday which was 2 days ago. He said he is feels short of breath when he walks too much. He is bloated he is having urinary frequency he said he is not sick with cold or chest congestion but he feels like he is wheezing at times. He said he has not been running a fever he said he does not have abdominal pain but he feels bloated he is also having urinary frequency. Said he does not have abdominal pain he just feels bloated. ROS    Pertinent positives and negatives are stated within HPI, all other systems reviewed and are negative. Past Surgical History:   Procedure Laterality Date    APPENDECTOMY      FOOT SURGERY      FRACTURE SURGERY     Social History:  reports that he has never smoked. He has never used smokeless tobacco.  Family History: family history is not on file. Allergies: Patient has no known allergies. Physical Exam   Oxygen Saturation Interpretation: Normal.   ED Triage Vitals [22 1904]   BP Temp Temp Source Pulse Resp SpO2 Height Weight   (!) 215/133 97.2 °F (36.2 °C) Infrared 99 20 96 % 5' 11\" (1.803 m) 300 lb (136.1 kg)     · {Constitutional/General: Alert and oriented x3, appears sob with talking  · HEENT:  NC/NT. · Neck: Supple, full ROM,  · Respiratory: Diminished but I do not hear any wheezing at this time s  · CV:  Regular rate. Regular rhythm. No murmurs, gallops, or rubs.  2+ distal pulses  · Chest: No chest wall tenderness  · Musculoskeletal: Moves all extremities x 4.   · Integument: skin warm and dry. No rashes. · Lymphatic: no lymphadenopathy noted  · Neurologic: GCS 15, no focal deficits,   · Psychiatric: Normal Affect    Lab / Imaging Results   (All laboratory and radiology results have been personally reviewed by myself)  Labs:  No results found for this visit on 02/28/22. Imaging: All Radiology results interpreted by Radiologist unless otherwise noted. No orders to display       ED Course / Medical Decision Making   Medications - No data to display       Consult(s):   None      MDM:   Blood pressure is really elevated at 215/133 he said he does not take anything for his blood pressure has not been diagnosed with hypertension he said he gets anxious when he is at the doctors. He said he has been wheezing he cannot do his usual exercising and activities he does not have chest pain but he said he does feel short of breath he feels that his abdomen is bloated and he is also having urinary symptoms. I did advise this patient that he will need to go to the emergency department for further work-up due to his complaints. Assessment      1. Elevated blood pressure reading    2. Shortness of breath    3. Urinary frequency      Plan   Advised to go to the Emergency Department    New Medications     New Prescriptions    No medications on file     Electronically signed by ÁNGELA Swift CNP   DD: 2/28/22  **This report was transcribed using voice recognition software. Every effort was made to ensure accuracy; however, inadvertent computerized transcription errors may be present.   END OF ED PROVIDER NOTE     ÁNGELA Swift CNP  02/28/22 4911

## 2022-03-01 NOTE — CONSULTS
Patient currently admitted with diagnosis of Unknown heart failure. I met with patient and wife at bedside regarding new consult. Pt and wife are agreeable for pt to establish at [place] [address] CHF clinic. Pt is scheduled with PCP on march 10th. I scheduled pt for HFU with JUAN PORTILLO on march 16th. CHF clinic appt scheduled for march 22nd. Clinic phone # provided to pt as well. Future Appointments   Date Time Provider Echo Dias   3/10/2022  1:45 PM Marylou Langford  [address]   3/16/2022  8:00 AM ÁNGELA Antunez - CNP [facility]   3/22/2022  8:00 AM [facility] CHF ROOM 1 [facility]            We reviewed the introduction to Heart Failure, the HF zones, signs and symptoms to report on day 1 of onset, medications, medication compliance, the importance of obtaining daily weights, following a low sodium diet, reading food labels for the sodium content, keeping physician appointments, and smoking cessation. We discussed writing / tracking daily weights on a calendar / log because a 5 pound gain in 1 week can sneak up if you are not tracking it. You can also take your charted weights to your doctor appointments to be reviewed. Contributing risk factors for Heart Failure are identified as hypertension. I advised patient they can reduce the risk for Heart Failure exacerbations by modifying / controlling the risk factors. We discussed self-managed care which includes the following:  to take medications as prescribed, report any intolerable side effects of medications to the cardiologist / doctor, do not just stop taking the medication; follow a cardiac heart healthy / low sodium diet; weigh yourself daily, exercise regularly- per doctor recommendation and not to smoke or use an excess amount of alcohol. We discussed calling the cardiologist / doctor with a weight gain of 3 pounds in one day or a total of 5 pounds or more in one week.  Also, if you should have a significant weight loss of 3# or more in one day to call the doctor, they may need to decrease or hold the diuretic dose. On days you feels nauseated and not eating / drinking, having emesis or diarrhea,  informed to call the cardiologist  / doctor, they may need to decrease or hold diuretic to avoid dehydration. I stressed the importance of informing their cardiologist the first day of onset of any of the signs and symptoms in the \"Yellow Zone\" of the HF Zones. Patient verbalizes understanding. Greater than 30 minutes was spent educating patient. The Heart Failure Booklet given to the patient with additional patient education addressing:  · What is Heart Failure? · Things You Can Do to Live Well with HF  · How to Take Your Medications  · How to Eat Less Salt  · Exercising Well with Heart Failure  · Signs and symptoms of HF to report  · Weight Yourself Each Day  · Home Self Management- activity, weight tracking, taking medications as prescribed, meals /diet planning (sodium and fluid restriction), how to read food labels, keeping physician follow ups, smoking cessation, follow the Heart Failure Zones  · The Heart Failure zones  · Sodium content pamphlet  · What foods I should avoid tip sheet    Instructed  to call 911 if you have any of the following symptoms:    ·    Struggling to breathe unrelieved with rest,  ·    Having chest pain, confusion or can't think clearly  ·    Have confusion or cant think clearly    The patient is ordered:  Diet: ADULT DIET; Regular; 3 carb choices (45 gm/meal);  Low Fat/Low Chol/High Fiber/2 gm Na; 1800 ml   Sodium controlled diet Yes  Fluid restriction daily ordered (fluid restriction recommended if patient is hyponatremic and/or diuretic is initiated or increased) Yes  FR: 1800ml  Daily Weights: ordered    Patient Vitals for the past 96 hrs (Last 3 readings):   Weight   03/01/22 0900 (!) 381 lb 12.8 oz (173.2 kg)   02/28/22 1944 300 lb (136.1 kg)     I/O:     Intake/Output Summary (Last 24 hours) at 3/1/2022 1236  Last data filed at 3/1/2022 0945  Gross per 24 hour   Intake 420 ml   Output 5000 ml   Net -4580 ml     Abraham Cooper RN RN  Heart Failure Navigator    CONGESTIVE HEART FAILURE (CHF) AHA GUIDELINES  (Must be completed for Primary Diagnosis CHF or History of CHF)    Discharge Plan:  I placed the Heart Failure Home Instructions in patient's discharge instructions. Per Heart Failure GWTG, the patient should have a follow-up appointment made within 7 days of discharge.     New Diagnosis Yes    No results found for: LVEF, LVEFMODE   ECHO ordered and needs done                                     Social History     Tobacco Use   Smoking Status Never Smoker   Smokeless Tobacco Never Used        Immunization History   Administered Date(s) Administered    COVID-19, Pfizer Purple top, DILUTE for use, 12+ yrs, 30mcg/0.3mL dose 03/17/2021, 04/07/2021, 12/11/2021          Angiotensin-Converting-Enzyme (ACE) inhibitor ordered:  [] Yes  [x] No (specify contraindication):    [] Contraindicated  [] Hypotensive patient who was at immediate risk of cardiogenic shock  [] Hospitalized patient who experienced marked azotemia  [] Other Contraindications  [] Not Eligible  [] Not Tolerant  [] Patient Reason  [] System Reason  [] Other Reason    Angiotensin II receptor blockers (ARB) ordered:  [] Yes  [x] No (specify contraindication):    [] Contraindicated  [] Hypotensive patient who was at immediate risk of cardiogenic shock  [] Hospitalized patient who experienced marked azotemia  [] Other Contraindications    ARNI - Angiotensin Receptor Neprilysin Inhibitor ordered:  [] Yes  [x] No (specify contraindication):    [] ACE inhibitor use within the prior 36 hours  [] Allergy  [] Hyperkalemia  [] Hypotension  [] Renal dysfunction defined as creatinine > 2.5 mg/dL in men or > 2.0 mg/dL in women  [] Other Contraindications  [] Not Eligible  [] Not Tolerant  [] Patient Reason  []System Reason  []Other Reason      Beta Blocker (Carvedilol, Metoprolol Succinate, or Bisoprolol) ordered:    [x] Yes  [] No (specify contraindication):    [] Contraindicated  [] Asthma  [] Fluid Overload  [] Low Blood Pressure  [] Patient recently treated with an intravenous positive inotropic agent  [] Other Contraindications  [] Not Eligible  [] Not Tolerant  [] Patient Reason  [] System Reason    SGLT2 Inhibitor ordered:  [] Yes  [x] No (specify contraindication):    [] Contraindicated  [] Patient currently on dialysis  [] Ketoacidosis  [] Known hypersensitivity to the medication  [] Type I diabetes (not approved for use in patients with Type I diabetes due to increased risk of ketoacidosis)  [] Other Contraindications  [] Not Eligible  [] Not Tolerant  [] Patient Reason  [] System Reason  [] Other Reason    Aldosterone Antagonist ordered:  [] Yes  [] No (specify contraindication):    [] Contraindicated  [] Allergy due to aldosterone receptor antagonist  [] Hyperkalemia  [] Renal dysfunction defined as creatinine >2.5 mg/dL in men or >2.0 mg/dL in women.   [] Other contraindications  [] Not Eligible  [] Not Tolerant  [] Patient Reason  [] System Reason  [] Other Reason

## 2022-03-01 NOTE — DISCHARGE INSTR - COC
Continuity of Care Form    Patient Name: Margarito Cardona   :  1975  MRN:  [id number]    Admit date:  2022  Discharge date:  ***    Code Status Order: Full Code   Advance Directives:      Admitting Physician:  Michelle Bailey MD  PCP: No primary care provider on file.     Discharging Nurse: [facility] Unit/Room#: 0421/0421-01  Discharging Unit Phone Number: ***    Emergency Contact:   Extended Emergency Contact Information  Primary Emergency Contact: Cony Edouard  Home Phone: [phone]  Relation: Spouse    Past Surgical History:  Past Surgical History:   Procedure Laterality Date    APPENDECTOMY      FOOT SURGERY      FRACTURE SURGERY         Immunization History:   Immunization History   Administered Date(s) Administered    COVID-19, Pfizer Purple top, DILUTE for use, 12+ yrs, 30mcg/0.3mL dose 2021, 2021, 2021       Active Problems:  Patient Active Problem List   Diagnosis Code    Hypertensive urgency I16.0    Hypertension I10    Acute combined systolic and diastolic congestive heart failure ([facility] [address] I50.41       Isolation/Infection:   Isolation            No Isolation          Patient Infection Status       None to display            Nurse Assessment:  Last Vital Signs: BP (!) 182/98 Comment: manual  Pulse 88   Temp 98.5 °F (36.9 °C) (Oral)   Resp 19   Ht 6' (1.829 m)   Wt 300 lb (136.1 kg)   SpO2 96%   BMI 40.69 kg/m²     Last documented pain score (0-10 scale):    Last Weight:   Wt Readings from Last 1 Encounters:   22 300 lb (136.1 kg)     Mental Status:  {IP PT MENTAL STATUS:}    IV Access:  { SOURAV IV ACCESS:286952330}    Nursing Mobility/ADLs:  Walking   {CHP DME LZZ}  Transfer  {CHP DME IYJB:636428188}  Bathing  {CHP DME XJUZ:065002535}  Dressing  {CHP DME NNCL:096803579}  Toileting  {CHP DME IOMR:187076672}  Feeding  {CHP DME QQTJ:955745871}  Med Admin  {[facility] DME FLEU:550621872}  Med Delivery   { SOURAV MED Delivery:272360806}    Wound Care Documentation and Therapy:        Elimination:  Continence: Bowel: {YES / WB:05672}  Bladder: {YES / YR:39266}  Urinary Catheter: {Urinary Catheter:505657910}   Colostomy/Ileostomy/Ileal Conduit: {YES / VE:66346}       Date of Last BM: ***    Intake/Output Summary (Last 24 hours) at 3/1/2022 0836  Last data filed at 3/1/2022 0614  Gross per 24 hour   Intake 0 ml   Output 3800 ml   Net -3800 ml     I/O last 3 completed shifts:   In: 0   Out: 3800 [Urine:3800]    Safety Concerns:     [address] Safety Concerns:105942804}    Impairments/Disabilities:      [address] Impairments/Disabilities:620274424}    Nutrition Therapy:  Current Nutrition Therapy:   [address] Diet List:772300234}    Routes of Feeding: {CHP DME Other Feedings:152108717}  Liquids: {Slp liquid thickness:71261}  Daily Fluid Restriction: {CHP DME Yes amt example:184301286}  Last Modified Barium Swallow with Video (Video Swallowing Test): {Done Not Done LPEZ:896615554}    Treatments at the Time of Hospital Discharge:   Respiratory Treatments: ***  Oxygen Therapy:  {Therapy; copd oxygen:83187}  Ventilator:    { CC Vent XOMP:645940832}    Rehab Therapies: {THERAPEUTIC INTERVENTION:9314234977}  Weight Bearing Status/Restrictions: [address]  Weight Bearin}  Other Medical Equipment (for information only, NOT a DME order):  {EQUIPMENT:482997223}  Other Treatments: ***    Patient's personal belongings (please select all that are sent with patient):  {CHP DME Belongings:900345058}    RN SIGNATURE:  {Esignature:407645690}    CASE MANAGEMENT/SOCIAL WORK SECTION    Inpatient Status Date: ***    Readmission Risk Assessment Score:  Readmission Risk              Risk of Unplanned Readmission:  8           Discharging to Facility/ Agency   Name:   Address:  Phone:  Fax:    Dialysis Facility (if applicable)   Name:  Address:  Dialysis Schedule:  Phone:  Fax:    / signature: {Esignature:530442637}    PHYSICIAN SECTION    Prognosis: {Prognosis:2948495801}    Condition at Discharge: Deniz Carrillo Patient Condition:530842636}    Rehab Potential (if transferring to Rehab): {Prognosis:6349574617}    Recommended Labs or Other Treatments After Discharge: ***    Physician Certification: I certify the above information and transfer of Maryann Jackson  is necessary for the continuing treatment of the diagnosis listed and that he requires {Admit to Appropriate Level of Care:81450} for {GREATER/LESS:995861420} 30 days. Update Admission H&P: {CHP DME Changes in VVGLV:110248623}    PHYSICIAN SIGNATURE:  {Esignature:458414069}                       HEART FAILURE  / CONGESTIVE HEART FAILURE  DISCHARGE INSTRUCTIONS:  GUIDELINES TO FOLLOW AT HOME    Self- Managed Care:     MEDICATIONS:  Take your medication as directed. If you are experiencing any side effects, inform your doctor, Do not stop taking any of your medications without letting your doctor know. Check with your doctor before taking any over-the-counter medications / herbal / or dietary supplements. They may interfere with your other medications. Do not take ibuprofen (Advil or Motrin) and naproxen (Aleve) without talking to your doctor first. They could make your heart failure worse. WEIGHT MONITORING:   Weigh yourself everyday (with the same scale) around the same time of the day and write it down. (you can chart them on a calendar or keep track of them on paper. Notify your doctor of a weight gain of 3 pounds or more in 1 day   OR a total of 5 pounds or more in 1 week    Take your weight record to your doctor visits  Also, the same goes if you loose more than 3# in one day, let your heart doctor know.          DIET:   Cardiac heart healthy diet- Low saturated / low trans fat, no added salt, caffeine restricted, Low sodium diet-   No more than 2,000mg (2 grams) of salt / sodium per day (which equals to a little less than  a teaspoon of salt)  If your doctor wants you on a fluid restriction. ..it is usually recommended a fluid limit of 2,000cc -  Fluid restriction- 2,000 ml (milliliters) = 64 ounces = you can have 8 glasses of fluid per day (each glass 8 ounces)    Follow a low salt diet - avoid using salt at the table, avoid / limit use of canned soups, processed / packaged foods, salted snacks, olives and pickles. Do not use a salt substitute without checking with your doctor, they may contain a high amount of potassioum. (Mrs. Beltran Tate is safe to use). Limit the use of alcohol       CALL YOUR DOCTOR THE FIRST DAY YOU NOTICE ANY OF THESE   SYMPTOMS:  You have a weight gain of 3 pounds or more in 1 day         OR 5 pounds or more in one week  More shortness of breath  More swelling of your stomach, legs, ankles or feet  Feeling more tired, No energy  Dry hacky cough  Dizziness  More chest pain / discomfort       (CALL 911 IF ANY OF THE FOLLOWING OCCURS  Chest pain (not relieved with nitroglycerine, if you have been prescribed this medication)  Severe shortness of breath  Faint / Pass out  Confusion / cannot think clearly  If symptoms get worse           SMOKING - TOBACCO USE:  * IF YOU SMOKE - STOP! Kick the habit. [address] Program is offered at [facility] [address] [address] [facility]. Call [phone] extension [phone] for more information. ACTIVITY:   (Ask your doctor when you will be able to return to work and before starting any exercise program.  Do not drive unless unless your doctor has given you permission to do so). Start light exercise. Even if you can only do a small amount, exercise will help you get stronger, have more energy, help manage your weight and decrease  stress. Walking is an easy way to get exercise.  Start out slowly and  increase the amount you walk as tolerated  If you become short of breath, dizzy or have chest pain; stop, sit down, and rest.  If you feel \"wiped out\" the day after you exercise, walk at a slower pace or for a shorter distance. You can gradually increase the pace or amount of time. (Do not exercise right after a meal or in extreme temperatures, such as above 85 degrees, if the air is really humid, or wind chill is less than 20 degrees)                                             ADDITIONAL INFORMATION:  Avoid getting sick from colds and the flu. Stay away from friends or family that you know may have a contagious illness  Get plenty of rest   Get a flu shot each year. Get a pneumococcal vaccine shot. If you have had one before, ask your doctor whether you need another dose. My Goal for Self-management of Heart Failure Includes 5 steps :    1. Notice a change in symptoms ( weight gain, short of breath, leg swelling, decreased activity level, bloating. ...)    2. Evaluate the change: (use the Heart Failure Zones )     3. Decide to take action: decide what your options are, such as: (call your doctor for an extra visit, take a prescribed medication, such as your water pill if your doctor has given you directions to do so, [address])    4. Come up with a strategy:  (now you call the doctor for advice / appointment. This is where you take action!!! Do not wait, catch the symptom early and treat it before it worsens. 5. Evaluate the response: The next day, check your Heart Failure Zones: are you in the GREEN ZONE (safe zone)? Worsening symptoms of YELLOW ZONE? Or have you moved to the RED ZONE and need to call 911 or go to the Emergency Room for evaluation? Call your doctor's office to update them on your symptoms of heart failure.

## 2022-03-01 NOTE — CARE COORDINATION
3/1/2022 1001 CM note: No covid testing. Met with patient and his wife Josefina eKnyon at bedside. Pt is independent with ADLs and drives. He has new PCP appt with Dr Maral Vu scheduled for March 10,2022. He uses [facility]. Pt plans to return home at [place]. Will await dietician and CHF Nurse input.  Christiano SEAMAN

## 2022-03-01 NOTE — PROGRESS NOTES
4. 0   BILITOT 0.5 0.8   AST 24 47*   ALT 28 29       Recent Labs     02/28/22  2111 03/01/22  0634   WBC 6.1 4.9   RBC 5.89* 5.75   HGB 15.8 15.5   HCT 48.3 47.5   MCV 82.0 82.6   MCH 26.8 27.0   MCHC 32.7 32.6   RDW 15.9* 15.9*    268   MPV 10.0 10.3           Radiology:   XR CHEST (2 VW)   Final Result   Atherosclerotic disease and cardiomegaly. Central interstitial edema. Assessment:  Principal Problem:    Hypertensive urgency  Active Problems:    Hypertension    Acute combined systolic and diastolic congestive heart failure (HCC)  Resolved Problems:    * No resolved hospital problems. *      Plan: History of present illness from History and Physical:   This is a 55 y.o. male  has a past medical history of Acute combined systolic and diastolic congestive heart failure ([address] and Hypertension. presented with Shortness of Breath, Cough, Leg edema for last few days prior to arrival to the hospital. SOB is associated with progressive leg swelling. Initially SOB was mild, intermittent but gradually getting more persistent. Started gradually. Exacerbated by general activity or exertion. Relieved only partially by rest.  The patient was seen and examined at bedside, appears alert and awake with no acute distress and is able to answer simple  questions. On direct questioning, patient denied any  resting ongoing chest pain, hemoptysis, productive cough, fever, ongoing palpitation, active abdominal pain, hematemesis, rectal bleeding, vimal, hematuria, any other  and GI complaints and any new focal neuro deficits.     1. Acute combined systolic and diastolic CHF   Cardio consult and 2D echo  Will order fluid and salt restriction  2. Hypertension urgency: Currently Poorly controlled. S/p IV labetalol in ED. PRN hydralazine. Cardio started bb/ Monitor vitals and adjust BP meds as needed  3. Morbid obesity: dietician consulted. Needs outpatient f/u.   Encourage to find was to enjoy hearth healthy diet and lifestyle    4. 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 Full code  6. DVT Prophylaxis : Lovenox 30 bid  7. Dispo: home with close f/u when ready    NOTE: This report was transcribed using voice recognition software. Every effort was made to ensure accuracy; however, inadvertent computerized transcription errors may be present.      Electronically signed by Destiny Umana MD on 3/1/2022 at 7:47 AM

## 2022-03-01 NOTE — PROGRESS NOTES
Dr. Josh Lovell MD,    Your patient is on a medication that requires a renal dose adjustment. Renal Function Assessment:    Date Body Weight IBW  Adjusted BW SCr  CrCl Dialysis status   3/1/2022 (!) 381 lb 12.8 oz (173.2 kg)  Ideal body weight: 77.6 kg (171 lb 1.2 oz)  Adjusted ideal body weight: 115.8 kg (255 lb 5.9 oz) Serum creatinine: 1.2 mg/dL 03/01/22 9655  Estimated creatinine clearance: 126 mL/min N/a       Pharmacy has renally dose-adjusted the following medication(s):    Date Original Order Renally Adjusted Order   3/1/2022 Lovenox 30mg BID Lovenox 40mg BID       These changes were made per protocol according to the [facility] Renal Function-Based Dose Adjustments Policy    *Please note this dose may need readjusted if your patient's renal function significantly improves. Please contact pharmacy with any questions regarding these changes.     Bret Singleton [facility]  3/1/2022  1:20 PM

## 2022-03-01 NOTE — CONSULTS
Inpatient [facility] Cardiology Consultation      Reason for Consult: CHF, HTN urgency    Consulting Physician: Dr. Issac Sibley    Requesting Physician: Dr. Taina Matos    Date of Consultation: 3/1/2022    HISTORY OF PRESENT ILLNESS:   Patient is a 55year old AAM not previously known to [facility] Cardiology. He is being seen in consultation this hospital admission by Dr. Issac Sibley for evaluation and recommendations regarding CHF and HTN urgency. PMH: HTN non-compliant with medical therapy, and morbid obesity. Denies history of DM, JAEL, HLD, CAD, MI, CHF, VHD or prior cardiac arrhythmia. Patient presented to [address] [place] [address] on February 28, 2022 with complaints of shortness of breath on exertion. · Per the patient, over the past couple of days he has noticed new onset + progressively worsening dyspnea on exertion. He denies dyspnea at rest, PND or orthopnea. He admits to new onset peripheral edema bilaterally. Additionally notes of sinus congestion, as well as urinary frequency and abdominal bloating. · He denies ever having chest discomfort at rest or on exertion, nausea, emesis, diaphoresis, dizziness, palpitations, near-syncope or syncope. He denies subjective fever, chills or cough. · Initially presented to local urgent care, who advised the patient come to the ED for further evaluation. Was not taking any medications at home. Please note: past medical records were reviewed per electronic medical record (EMR) - see detailed reports under Past Medical/ Surgical History. PAST MEDICAL HISTORY:    · Morbid obesity. · Medical non-compliance. · History of HTN, diagnosed in 2008 --> started on antihypertensive therapy at that time, however patient self-discontinued later that year. Has not been on antihypertensive therapy since.        PAST SURGICAL HISTORY:    Past Surgical History:   Procedure Laterality Date    APPENDECTOMY      FOOT SURGERY      FRACTURE SURGERY         HOME MEDICATIONS:  Prior to Admission medications    Not on File       CURRENT MEDICATIONS:      Current Facility-Administered Medications:     nitroGLYCERIN (NITROSTAT) SL tablet 0.4 mg, 0.4 mg, SubLINGual, Q5 Min PRN, Lionel Quezada MD    amLODIPine (NORVASC) tablet 5 mg, 5 mg, Oral, Daily, Lionel Quezada MD    lisinopril (PRINIVIL;ZESTRIL) tablet 2.5 mg, 2.5 mg, Oral, Daily, Lionel Quezada MD    hydrALAZINE (APRESOLINE) injection 20 mg, 20 mg, IntraVENous, Q6H PRN, Emeka Rucker MD, 20 mg at 03/01/22 0057    furosemide (LASIX) injection 40 mg, 40 mg, IntraVENous, BID, Emeka Rucker MD    cloNIDine (CATAPRES) tablet 0.2 mg, 0.2 mg, Oral, Q8H PRN, Emeka Rucker MD    sodium chloride flush 0.9 % injection 10 mL, 10 mL, IntraVENous, 2 times per day, Emeka Rucker MD, 10 mL at 03/01/22 0003    sodium chloride flush 0.9 % injection 10 mL, 10 mL, IntraVENous, PRN, Emeka Rucker MD    0.9 % sodium chloride infusion, 25 mL, IntraVENous, PRN, Emeka Rucker MD    enoxaparin (LOVENOX) injection 30 mg, 30 mg, SubCUTAneous, BID, Emeka Rucker MD    promethazine (PHENERGAN) tablet 12.5 mg, 12.5 mg, Oral, Q6H PRN **OR** ondansetron (ZOFRAN) injection 4 mg, 4 mg, IntraVENous, Q6H PRN, Emeka Rucker MD    polyethylene glycol (GLYCOLAX) packet 17 g, 17 g, Oral, Daily PRN, Emeka Rucker MD    acetaminophen (TYLENOL) tablet 650 mg, 650 mg, Oral, Q6H PRN **OR** acetaminophen (TYLENOL) suppository 650 mg, 650 mg, Rectal, Q6H PRN, Emeka Rucker MD      ALLERGIES:  Patient has no known allergies. SOCIAL HISTORY:    Social alcohol use. Denies former or current tobacco or illicit drug use. FAMILY HISTORY:   Father with history of CABG x 4 in his 46s. Brother who passed away from MI in his 35s. Denies any other pertinent cardiac family medical history me at this time. REVIEW OF SYSTEMS:     · Constitutional: Denies fevers, chills, night sweats, and generalized fatigue.  Denies significant weight loss or weight gain.  · HEENT: Denies headaches, nose bleeds, rhinorrhea, sore throat. Denies blurred vision. Denies dysphagia, odynophagia. · Musculoskeletal: Denies falls, pain to BLE with ambulation. Denies muscle weakness. · Neurological: Denies dizziness or lightheadedness, numbness and tingling. Denies focal neurological deficits. · Cardiovascular: +peripheral edema. Denies chest pain, palpitations, diaphoresis. Denies near syncope, syncope. Denies PND, orthopnea. · Respiratory: +TRAMMELL. Denies cough, hemoptysis. · Gastrointestinal: +bloating. Denies abdominal pain, nausea/vomiting, diarrhea and constipation, black/bloody, and tarry stools. · Genitourinary: Denies dysuria and hematuria. · Hematologic: Denies excessive bruising or bleeding. · Endocrine: Denies excessive thirst. Denies intolerance to hot and cold. PHYSICAL EXAM:   BP (!) 182/98 Comment: manual  Pulse 88   Temp 98.5 °F (36.9 °C) (Oral)   Resp 19   Ht 6' (1.829 m)   Wt 300 lb (136.1 kg)   SpO2 96%   BMI 40.69 kg/m²   CONST:  Well developed, morbidly obese AAM who appears stated age. Awake, alert, cooperative, no apparent distress. HEENT:   Head- Normocephalic, atraumatic. Eyes- Conjunctivae pink, anicteric. Neck-  No stridor, trachea midline, +JVD. CHEST: Chest symmetrical and non-tender to palpation. No accessory muscle use or intercostal retractions. RESPIRATORY: Lung sounds - diminished with rare rales. CARDIOVASCULAR:     No noted carotid bruit. Heart Ausculation- Regular rate and rhythm, no apparent murmur. PV: 1+ bilateral lower extremity edema. Pedal pulses palpable, no clubbing or cyanosis. ABDOMEN: Soft, non-tender to light palpation. Bowel sounds present. MS: Good muscle strength and tone. No atrophy or abnormal movements. SKIN: Warm and dry. NEURO / PSYCH: Oriented to person, place and time. Speech clear and appropriate. Follows all commands. Pleasant affect.       DATA:    Telemetry: NSR with HR in the [de-identified]    Diagnostic:  All diagnostic testing and lab work thus far this admission reviewed in detail. CXR 2/28/2022:  Impression: Atherosclerotic disease and cardiomegaly.  Central interstitial edema. Intake/Output Summary (Last 24 hours) at 3/1/2022 0815  Last data filed at 3/1/2022 6165  Gross per 24 hour   Intake 0 ml   Output 3800 ml   Net -3800 ml       Labs:   CBC:   Recent Labs     02/28/22 2111 03/01/22  0634   WBC 6.1 4.9   HGB 15.8 15.5   HCT 48.3 47.5    268     BMP:   Recent Labs     02/28/22 2111 03/01/22  0634    140   K 3.4* 4.2   CO2 30* 31*   BUN 14 12   CREATININE 1.5* 1.2   LABGLOM >60 >60   CALCIUM 9.1 9.0     Mag:   Recent Labs     02/28/22 2111   MG 2.0     PT/INR:   Recent Labs     02/28/22 2111   PROTIME 13.6*   INR 1.2     APTT:  Recent Labs     02/28/22 2111   APTT 27.0     LIVER PROFILE:  Recent Labs     02/28/22 2111 03/01/22  0634   AST 24 47*   ALT 28 29   LABALBU 4.1 4.0      Ref Range & Units 02/28/22 2111   Troponin, High Sensitivity 0 - 11 ng/L 46 High        Ref Range & Units 03/01/22 0039   Troponin, High Sensitivity 0 - 11 ng/L 42 High        Ref Range & Units 02/28/22 2111   Pro-BNP 0 - 125 pg/mL 1,313 High        02/28/22 2111    Color, UA Straw/Yellow Yellow    Clarity, UA Clear Clear    Glucose, Ur Negative mg/dL Negative    Bilirubin Urine Negative Negative    Ketones, Urine Negative mg/dL Negative    Specific Gravity, UA 1.005 - 1.030 1.020    Blood, Urine Negative Negative    pH, UA 5.0 - 9.0 6.5    Protein, UA Negative mg/dL Negative    Urobilinogen, Urine <2.0 E.U./dL 0.2    Nitrite, Urine Negative Negative    Leukocyte Esterase, Urine Negative Negative        ASSESSMENT:  · Acute heart failure with unknown ejection fraction. Likely in setting of severely uncontrolled hypertension. Still appears hypervolemic on exam.  proBNP 1300. Fluid balance -5.2 L since admission with IV diuresis.   · Hypertensive urgency, in setting of medical non-compliance. · Elevated troponin, hS-troponin 46 --> 42. Likely acute myocardial injury in setting of uncontrolled hypertension, CHF and RONAK. Denies chest discomfort, with no acute ST-T wave changes on EKG. · Acute renal failure, improved. · Elevated AST. · Hypokalemia, supplemented. · Morbid obesity. · Probable JAEL. RECOMMENDATIONS:  · Echocardiogram for evaluation of LV/RV function and VHD. · Continue with IV diuresis, will decrease to 20 mg IV twice daily. · Monitor strict intake and output, daily weights. Continue to monitor renal function and electrolytes. · We will switch lisinopril to Coreg 6.25 mg twice daily. · Continue other cardiac medications the same at this time. · Check TSH and lipid panel. · Recommend outpatient sleep study evaluation. · Consider ischemic evaluation with stress testing prior to discharge, or as an outpatient, particularly given significant family cardiac medical history. · Further recommendations to follow as per Dr. Malou Salomon. The above case and recommendations have been discussed and made in collaboration with Dr. Malou Salomon. NOTE: This report was transcribed using voice recognition software. Every effort was made to ensure accuracy; however, inadvertent computerized transcription errors may be present. Сергей Mayo, [address] [place] [address], [place] [address] Cardiology    Electronically signed by Evonne Crenshaw PA-C on 3/1/2022 at 8:15 AM       Patient seen and examined, chart reviewed in detail, and case discussed in detail with cardiology nurse practitioner and agree with assessment and plan and history of discussed ablation discussed in detail and documented above.  I contributed more than 50% of the patient's history/physical examination, review of blood work and cardiac studies, and assessment and plan.   N.p.o. after midnight for possible stress test tomorrow.

## 2022-03-02 ENCOUNTER — APPOINTMENT (OUTPATIENT)
Dept: NON INVASIVE DIAGNOSTICS | Age: 47
DRG: 291 | End: 2022-03-02
Payer: COMMERCIAL

## 2022-03-02 ENCOUNTER — APPOINTMENT (OUTPATIENT)
Dept: NUCLEAR MEDICINE | Age: 47
DRG: 291 | End: 2022-03-02
Payer: COMMERCIAL

## 2022-03-02 LAB
ANION GAP SERPL CALCULATED.3IONS-SCNC: 14 MMOL/L (ref 7–16)
BUN BLDV-MCNC: 13 MG/DL (ref 6–20)
CALCIUM SERPL-MCNC: 9.1 MG/DL (ref 8.6–10.2)
CHLORIDE BLD-SCNC: 98 MMOL/L (ref 98–107)
CHOLESTEROL, TOTAL: 175 MG/DL (ref 0–199)
CO2: 28 MMOL/L (ref 22–29)
CREAT SERPL-MCNC: 1.3 MG/DL (ref 0.7–1.2)
GFR AFRICAN AMERICAN: >60
GFR NON-AFRICAN AMERICAN: >60 ML/MIN/1.73
GLUCOSE BLD-MCNC: 108 MG/DL (ref 74–99)
HDLC SERPL-MCNC: 37 MG/DL
LDL CHOLESTEROL CALCULATED: 118 MG/DL (ref 0–99)
LV EF: 34 %
LV EF: 35 %
LVEF MODALITY: NORMAL
LVEF MODALITY: NORMAL
MAGNESIUM: 2 MG/DL (ref 1.6–2.6)
POTASSIUM SERPL-SCNC: 3.8 MMOL/L (ref 3.5–5)
SODIUM BLD-SCNC: 140 MMOL/L (ref 132–146)
TRIGL SERPL-MCNC: 101 MG/DL (ref 0–149)
TSH SERPL DL<=0.05 MIU/L-ACNC: 3.35 UIU/ML (ref 0.27–4.2)
VLDLC SERPL CALC-MCNC: 20 MG/DL

## 2022-03-02 PROCEDURE — 2580000003 HC RX 258: Performed by: INTERNAL MEDICINE

## 2022-03-02 PROCEDURE — 36415 COLL VENOUS BLD VENIPUNCTURE: CPT

## 2022-03-02 PROCEDURE — A9500 TC99M SESTAMIBI: HCPCS | Performed by: RADIOLOGY

## 2022-03-02 PROCEDURE — 84443 ASSAY THYROID STIM HORMONE: CPT

## 2022-03-02 PROCEDURE — 78452 HT MUSCLE IMAGE SPECT MULT: CPT

## 2022-03-02 PROCEDURE — 6360000002 HC RX W HCPCS: Performed by: INTERNAL MEDICINE

## 2022-03-02 PROCEDURE — 6360000004 HC RX CONTRAST MEDICATION: Performed by: INTERNAL MEDICINE

## 2022-03-02 PROCEDURE — 83735 ASSAY OF MAGNESIUM: CPT

## 2022-03-02 PROCEDURE — 93016 CV STRESS TEST SUPVJ ONLY: CPT | Performed by: INTERNAL MEDICINE

## 2022-03-02 PROCEDURE — 6370000000 HC RX 637 (ALT 250 FOR IP): Performed by: INTERNAL MEDICINE

## 2022-03-02 PROCEDURE — 3430000000 HC RX DIAGNOSTIC RADIOPHARMACEUTICAL: Performed by: RADIOLOGY

## 2022-03-02 PROCEDURE — 1200000000 HC SEMI PRIVATE

## 2022-03-02 PROCEDURE — 6360000002 HC RX W HCPCS: Performed by: PHYSICIAN ASSISTANT

## 2022-03-02 PROCEDURE — 99232 SBSQ HOSP IP/OBS MODERATE 35: CPT | Performed by: INTERNAL MEDICINE

## 2022-03-02 PROCEDURE — 80048 BASIC METABOLIC PNL TOTAL CA: CPT

## 2022-03-02 PROCEDURE — 78452 HT MUSCLE IMAGE SPECT MULT: CPT | Performed by: INTERNAL MEDICINE

## 2022-03-02 PROCEDURE — 99233 SBSQ HOSP IP/OBS HIGH 50: CPT | Performed by: INTERNAL MEDICINE

## 2022-03-02 PROCEDURE — C8929 TTE W OR WO FOL WCON,DOPPLER: HCPCS

## 2022-03-02 PROCEDURE — 80061 LIPID PANEL: CPT

## 2022-03-02 PROCEDURE — 93017 CV STRESS TEST TRACING ONLY: CPT

## 2022-03-02 RX ORDER — CARVEDILOL 25 MG/1
25 TABLET ORAL 2 TIMES DAILY WITH MEALS
Status: DISCONTINUED | OUTPATIENT
Start: 2022-03-02 | End: 2022-03-03 | Stop reason: HOSPADM

## 2022-03-02 RX ORDER — ATORVASTATIN CALCIUM 40 MG/1
40 TABLET, FILM COATED ORAL NIGHTLY
Status: DISCONTINUED | OUTPATIENT
Start: 2022-03-02 | End: 2022-03-03 | Stop reason: HOSPADM

## 2022-03-02 RX ORDER — ISOSORBIDE MONONITRATE 30 MG/1
30 TABLET, EXTENDED RELEASE ORAL DAILY
Status: DISCONTINUED | OUTPATIENT
Start: 2022-03-02 | End: 2022-03-03 | Stop reason: HOSPADM

## 2022-03-02 RX ORDER — LORAZEPAM 0.5 MG/1
0.5 TABLET ORAL EVERY 4 HOURS PRN
Status: DISCONTINUED | OUTPATIENT
Start: 2022-03-02 | End: 2022-03-03 | Stop reason: HOSPADM

## 2022-03-02 RX ORDER — HYDRALAZINE HYDROCHLORIDE 25 MG/1
25 TABLET, FILM COATED ORAL EVERY 12 HOURS SCHEDULED
Status: DISCONTINUED | OUTPATIENT
Start: 2022-03-02 | End: 2022-03-03 | Stop reason: HOSPADM

## 2022-03-02 RX ORDER — ASPIRIN 81 MG/1
81 TABLET, CHEWABLE ORAL DAILY
Status: DISCONTINUED | OUTPATIENT
Start: 2022-03-02 | End: 2022-03-03 | Stop reason: HOSPADM

## 2022-03-02 RX ADMIN — ATORVASTATIN CALCIUM 40 MG: 40 TABLET, FILM COATED ORAL at 19:44

## 2022-03-02 RX ADMIN — FUROSEMIDE 20 MG: 10 INJECTION, SOLUTION INTRAMUSCULAR; INTRAVENOUS at 19:44

## 2022-03-02 RX ADMIN — REGADENOSON 0.4 MG: 0.08 INJECTION, SOLUTION INTRAVENOUS at 09:58

## 2022-03-02 RX ADMIN — FUROSEMIDE 20 MG: 10 INJECTION, SOLUTION INTRAMUSCULAR; INTRAVENOUS at 13:04

## 2022-03-02 RX ADMIN — ISOSORBIDE MONONITRATE 30 MG: 30 TABLET, EXTENDED RELEASE ORAL at 19:44

## 2022-03-02 RX ADMIN — PERFLUTREN 1.65 MG: 6.52 INJECTION, SUSPENSION INTRAVENOUS at 08:00

## 2022-03-02 RX ADMIN — ASPIRIN 81 MG: 81 TABLET, CHEWABLE ORAL at 17:50

## 2022-03-02 RX ADMIN — CARVEDILOL 25 MG: 25 TABLET, FILM COATED ORAL at 17:50

## 2022-03-02 RX ADMIN — AMLODIPINE BESYLATE 5 MG: 5 TABLET ORAL at 13:04

## 2022-03-02 RX ADMIN — HYDRALAZINE HYDROCHLORIDE 25 MG: 25 TABLET, FILM COATED ORAL at 19:44

## 2022-03-02 RX ADMIN — Medication 10 MILLICURIE: at 07:32

## 2022-03-02 RX ADMIN — Medication 10 ML: at 19:45

## 2022-03-02 ASSESSMENT — PAIN DESCRIPTION - PROGRESSION: CLINICAL_PROGRESSION: NOT CHANGED

## 2022-03-02 ASSESSMENT — PAIN SCALES - GENERAL: PAINLEVEL_OUTOF10: 0

## 2022-03-02 NOTE — PROCEDURES
[facility] Nuclear Stress Test:    Cardiologist: Dr. Corina Morgan MD    Date: 3/2/2022    Indications for study: Chest pain    1. No chest pain  2. 6 beats of nonsustained VT were noted  3. No EKG changes suggestive of stress induced ischemia  4.  Nuclear images pending    Corina Morgan MD  [facility]) Cardiology

## 2022-03-02 NOTE — PROGRESS NOTES
INPATIENT CARDIOLOGY Follow UP    Name: Fernie Reyes    Age: 55 y.o. Date of Admission: 2/28/2022  8:30 PM    Date of Service: 3/2/2022      Referring Physician: Emeka Rucker MD      Subjective: Stress test and echocardiogram abnormal and I have offered invasive coronary angiogram and patient in agreement and subsequently arrangement has been made for left heart catheter possible C PCI tomorrow at [facility]. Patient wife was also involved in the discussion and she verbalized understanding            Past Medical History:  Past Medical History:   Diagnosis Date    Acute combined systolic and diastolic congestive heart failure ([address] 03/01/2022    H/O cardiovascular stress test 03/02/2022    Nuclear Lexiscan Stress Test    Hypertension        Past Surgical History:  Past Surgical History:   Procedure Laterality Date    APPENDECTOMY      FOOT SURGERY      FRACTURE SURGERY         Family History:  History reviewed. No pertinent family history. Social History:  Social History     Socioeconomic History    Marital status: Single     Spouse name: Not on file    Number of children: Not on file    Years of education: Not on file    Highest education level: Not on file   Occupational History    Not on file   Tobacco Use    Smoking status: Never Smoker    Smokeless tobacco: Never Used   Substance and Sexual Activity    Alcohol use: Never     Comment: socially    Drug use: Never    Sexual activity: Yes     Partners: Female   Other Topics Concern    Not on file   Social History Narrative    Not on file     Social Determinants of Health     Financial Resource Strain:     Difficulty of Paying Living Expenses: Not on file   Food Insecurity:     Worried About Running Out of Food in the Last Year: Not on file    Tori of Food in the Last Year: Not on file   Transportation Needs:     Lack of Transportation (Medical): Not on file    Lack of Transportation (Non-Medical):  Not on file   Physical Activity:     Days of Exercise per Week: Not on file    Minutes of Exercise per Session: Not on file   Stress:     Feeling of Stress : Not on file   Social Connections:     Frequency of Communication with Friends and Family: Not on file    Frequency of Social Gatherings with Friends and Family: Not on file    Attends Evangelical Services: Not on file    Active Member of [address] [place] [address] or Organizations: Not on file    Attends Club or Organization Meetings: Not on file    Marital Status: Not on file   Intimate Partner Violence:     Fear of Current or Ex-Partner: Not on file    Emotionally Abused: Not on file    Physically Abused: Not on file    Sexually Abused: Not on file   Housing Stability:     Unable to Pay for Housing in the Last Year: Not on file    Number of [place] in the Last Year: Not on file    Unstable Housing in the Last Year: Not on file       Allergies:  No Known Allergies    Home Medications:  Prior to Admission medications    Not on File       Current Medications:  Current Facility-Administered Medications   Medication Dose Route Frequency Provider Last Rate Last Admin    technetium sestamibi (CARDIOLITE) injection 30 millicurie  30 millicurie IntraVENous ONCE PRN Kwame Hammond MD        LORazepam (ATIVAN) tablet 0.5 mg  0.5 mg Oral Q4H PRN Morton Fothergill, MD        carvedilol (COREG) tablet 25 mg  25 mg Oral BID WC Al Gordon MD   25 mg at 03/02/22 1750    hydrALAZINE (APRESOLINE) tablet 25 mg  25 mg Oral 2 times per day David Gordon MD        aspirin chewable tablet 81 mg  81 mg Oral Daily Al Gordon MD   81 mg at 03/02/22 1750    atorvastatin (LIPITOR) tablet 40 mg  40 mg Oral Nightly Al Gordon MD        nitroGLYCERIN (NITROSTAT) SL tablet 0.4 mg  0.4 mg SubLINGual Q5 Min PRN Morton Fothergill, MD        furosemide (LASIX) injection 20 mg  20 mg IntraVENous BID Emirati Polynkilo, PA-C   20 mg at 03/02/22 1304    hydrALAZINE (APRESOLINE) injection 20 mg  20 mg IntraVENous Q6H PRN Marce Vyas MD   20 mg at 03/01/22 0057    sodium chloride flush 0.9 % injection 10 mL  10 mL IntraVENous 2 times per day Marce Vyas MD   10 mL at 03/01/22 2030    sodium chloride flush 0.9 % injection 10 mL  10 mL IntraVENous PRN Marce Vyas MD        0.9 % sodium chloride infusion  25 mL IntraVENous PRN Marce Vyas MD        promethazine (PHENERGAN) tablet 12.5 mg  12.5 mg Oral Q6H PRN Marce Vyas MD        Or    ondansetron (ZOFRAN) injection 4 mg  4 mg IntraVENous Q6H PRN Marce Vyas MD        polyethylene glycol (GLYCOLAX) packet 17 g  17 g Oral Daily PRN Marce Vyas MD        acetaminophen (TYLENOL) tablet 650 mg  650 mg Oral Q6H PRN Marce Vyas MD        Or    acetaminophen (TYLENOL) suppository 650 mg  650 mg Rectal Q6H PRN Marce Vyas MD          sodium chloride         Physical Exam:  BP (!) 188/98 Comment: manual  Pulse 88   Temp 98.1 °F (36.7 °C) (Oral)   Resp 20   Ht 6' (1.829 m)   Wt (!) 376 lb 11.2 oz (170.9 kg)   SpO2 93%   BMI 51.09 kg/m²   Wt Readings from Last 3 Encounters:   03/02/22 (!) 376 lb 11.2 oz (170.9 kg)   02/28/22 300 lb (136.1 kg)       Appearance: Alert and oriented x3 not in acute distress.   Skin: Dry skin  Head: Atraumatic  Eyes: Intact extraocular muscles   ENMT: Mucous membranes are moist  Neck: Supple  Lungs: Clear to auscultation  Cardiac: Normal S1 and S2  Abdomen: Protuberant  Extremities: Intact range of motion  Neurologic: No focal neurological deficits  Peripheral Pulses: 2+ peripheral pulses    Intake/Output:    Intake/Output Summary (Last 24 hours) at 3/2/2022 1856  Last data filed at 3/2/2022 1752  Gross per 24 hour   Intake 240 ml   Output 1075 ml   Net -835 ml     I/O this shift:  In: 240 [P.O.:240]  Out: -     Laboratory Tests:  Recent Labs     02/28/22 2111 03/01/22  0634 03/02/22  0616    140 140   K 3.4* 4.2 3.8    100 98   CO2 30* 31* 28   BUN 14 12 13 CREATININE 1.5* 1.2 1.3*   GLUCOSE 124* 118* 108*   CALCIUM 9.1 9.0 9.1     Lab Results   Component Value Date    MG 2.0 03/02/2022    MG 2.0 02/28/2022     Recent Labs     02/28/22 2111 03/01/22  0634   ALKPHOS 69 58   ALT 28 29   AST 24 47*   PROT 7.3 7.2   BILITOT 0.5 0.8   LABALBU 4.1 4.0     Recent Labs     02/28/22 2111 03/01/22  0634   WBC 6.1 4.9   RBC 5.89* 5.75   HGB 15.8 15.5   HCT 48.3 47.5   MCV 82.0 82.6   MCH 26.8 27.0   MCHC 32.7 32.6   RDW 15.9* 15.9*    268   MPV 10.0 10.3     No results found for: CKTOTAL, CKMB, CKMBINDEX, TROPONINI  Recent Labs     02/28/22 2111 03/01/22  0039   TROPHS 46* 42*     Lab Results   Component Value Date    INR 1.2 02/28/2022    PROTIME 13.6 (H) 02/28/2022     Lab Results   Component Value Date    TSH 3.350 03/02/2022     No results found for: LABA1C  No results found for: EAG  Lab Results   Component Value Date    CHOL 175 03/02/2022     Lab Results   Component Value Date    TRIG 101 03/02/2022     Lab Results   Component Value Date    HDL 37 03/02/2022     Lab Results   Component Value Date    LDLCALC 118 (H) 03/02/2022     Lab Results   Component Value Date    LABVLDL 20 03/02/2022     No results found for: [facility]  Recent Labs     02/28/22 2111   PROBNP 1,313*       Cardiac Tests:      Echocardiogram reviewed: March 2, 2022  Summary   Moderate concentric left ventricular hypertrophy 1.6cm. Ejection fraction is visually estimated at 33 to 36%. Right ventricular not well seen but TAPSE of 2.4cm suggest normal RV   systolic function. Mildly thickened mitral valve leaflets. Mild mitral regurgitation is present. Individual aortic valve leaflets are not clearly visualized. No hemodynamically significant aortic stenosis is present. Mild tricuspid regurgitation. Stress test reviewed: March 2, 2022  1.  The myocardial perfusion is abnormal.   2. Fixed defects involving the anterior, anterolateral, and inferior   walls suggestive of prior infarct. No reversible defects to suggest   ischemia. 3. Dilated left ventricle with moderately reduced LV systolic   function, EF 51%. 4. There is no transient ischemic dilation. 5. High risk myocardial perfusion study based on LV dysfunction. Cardiac catheterization reviewed:     CXR reviewed: The 10-year ASCVD risk score (Media Pile., et al., 2013) is: 15.2%    Values used to calculate the score:      Age: 55 years      Sex: Male      Is Non- : Yes      Diabetic: No      Tobacco smoker: No      Systolic Blood Pressure: 041 mmHg      Is BP treated: Yes      HDL Cholesterol: 37 mg/dL      Total Cholesterol: 175 mg/dL    ASSESSMENT / PLAN:    1. Hypertensive urgency  Patient has been on clonidine and this medication is discontinued given significant rebound hypertension  I have increase carvedilol dose  Given depressed static function I have also added hydralazine and will add Imdur dose tonight  2. Congestive heart failure, unspecified HF chronicity, unspecified heart failure type ([address]  Newly depressed heart failure with depressed systolic function with EF of 33 to 36%  Is awaiting invasive coronary angiogram with possible PCI tomorrow  N.p.o. after midnight  Discontinued amlodipine and clonidine  Blood pressure is still not adequately controlled and carvedilol was increased to 25 twice a day  Hydralazine and Imdur is added on a stepwise approach  Add Entresto low-dose after cardiac catheterization if kidney function is stable  Continue low-dose diuresis and change to p.o. after cardiac catheterization  Strict in and out monitoring, low-salt diet, daily weight   LifeVest prior to going home  Avoid alcohol if any    3. Renal insufficiency  Monitor BUN/creatinine closely    4. Abnormal stress test  Awaiting cardiac transition tomorrow  N.p.o. after midnight  Continue beta-blocker, aspirin, statin, hydralazine and Imdur    5.   Suspected sleep apnea  Outpatient sleep study evaluation    6. Obesity  Weight loss was recommended    7. Electrolyte dysfunction  Management per primary service            Thank you for allowing me to participate in your patient's care. Please feel free to contact me if you have any questions or concerns.     Corina Morgan MD  [facility]) Cardiology

## 2022-03-02 NOTE — CARE COORDINATION
3/2/2022 1045 CM note: No covid testing. Pt has new PCP appt with Dr Asad Grant scheduled for March 10,2022. [facility] appt scheduled for 3/22/22. Pt for stress test today. He plans to return home at [place], family will provide ride. Await stress test results.  Christiano SEAMAN

## 2022-03-02 NOTE — PROGRESS NOTES
Admitting Date and Time: 2/28/2022  8:30 PM  Admit Dx: Renal insufficiency [N28.9]  Hypertensive urgency [I16.0]  Congestive heart failure, unspecified HF chronicity, unspecified heart failure type ([facility] [address] [I50.9]    Subjective:    No new issues. Still bloated and edematous  Anxious to go home. Reminded me that I told him I was hoping to send him home today on 3/2. However he understands I wish to keep himn for further diuresis and to complete w/u that was pending when I met him today in cardiac testing  ROS: denies fever, chills, cp, sob, n/v, HA unless stated above.      amLODIPine  5 mg Oral Daily    carvedilol  6.25 mg Oral BID WC    furosemide  20 mg IntraVENous BID    enoxaparin  40 mg SubCUTAneous BID    sodium chloride flush  10 mL IntraVENous 2 times per day     nitroGLYCERIN, 0.4 mg, Q5 Min PRN  hydrALAZINE, 20 mg, Q6H PRN  cloNIDine, 0.2 mg, Q8H PRN  sodium chloride flush, 10 mL, PRN  sodium chloride, 25 mL, PRN  promethazine, 12.5 mg, Q6H PRN   Or  ondansetron, 4 mg, Q6H PRN  polyethylene glycol, 17 g, Daily PRN  acetaminophen, 650 mg, Q6H PRN   Or  acetaminophen, 650 mg, Q6H PRN         Objective:    BP (!) 160/88 Comment: manual  Pulse 83   Temp 98.1 °F (36.7 °C) (Oral)   Resp 20   Ht 6' (1.829 m)   Wt (!) 376 lb 11.2 oz (170.9 kg)   SpO2 93%   BMI 51.09 kg/m²   General Appearance: alert and oriented to person, place and time and in no acute distress  Skin: warm and dry  Head: normocephalic and atraumatic  Eyes: pupils equal, round, and reactive to light, extraocular eye movements intact, conjunctivae normal  Neck: neck supple and non tender without mass   Pulmonary/Chest: clear to auscultation bilaterally- no wheezes, rales or rhonchi, normal air movement, no respiratory distress  Cardiovascular: normal rate, normal S1 and S2 and no carotid bruits  Abdomen: soft, non-tender, non-distended, normal bowel sounds, no masses or organomegaly  Extremities: no cyanosis, no clubbing  significant edema  Neurologic: no cranial nerve deficit and speech normal      Recent Labs     02/28/22 2111 03/01/22  0634    140   K 3.4* 4.2    100   CO2 30* 31*   BUN 14 12   CREATININE 1.5* 1.2   GLUCOSE 124* 118*   CALCIUM 9.1 9.0       Recent Labs     02/28/22 2111 03/01/22  0634   ALKPHOS 69 58   PROT 7.3 7.2   LABALBU 4.1 4.0   BILITOT 0.5 0.8   AST 24 47*   ALT 28 29       Recent Labs     02/28/22 2111 03/01/22  0634   WBC 6.1 4.9   RBC 5.89* 5.75   HGB 15.8 15.5   HCT 48.3 47.5   MCV 82.0 82.6   MCH 26.8 27.0   MCHC 32.7 32.6   RDW 15.9* 15.9*    268   MPV 10.0 10.3           Radiology:   XR CHEST (2 VW)   Final Result   Atherosclerotic disease and cardiomegaly. Central interstitial edema. NM MYOCARDIAL SPECT REST EXERCISE OR RX    (Results Pending)       Assessment:  Principal Problem:    Hypertensive urgency  Active Problems:    Hypertension    Acute combined systolic and diastolic congestive heart failure (HCC)  Resolved Problems:    * No resolved hospital problems. *      Plan: History of present illness from History and Physical:   This is a 55 y.o. male  has a past medical history of Acute combined systolic and diastolic congestive heart failure ([address] and Hypertension. presented with Shortness of Breath, Cough, Leg edema for last few days prior to arrival to the hospital. SOB is associated with progressive leg swelling. Initially SOB was mild, intermittent but gradually getting more persistent. Started gradually. Exacerbated by general activity or exertion. Relieved only partially by rest.  The patient was seen and examined at bedside, appears alert and awake with no acute distress and is able to answer simple  questions.  On direct questioning, patient denied any  resting ongoing chest pain, hemoptysis, productive cough, fever, ongoing palpitation, active abdominal pain, hematemesis, rectal bleeding, vimal, hematuria, any other  and GI complaints and any new focal neuro deficits.     3/2/22: The myocardial perfusion is abnormal.   2. Fixed defects involving the anterior, anterolateral, and inferior   walls suggestive of prior infarct. No reversible defects to suggest   ischemia. 3. Dilated left ventricle with moderately reduced LV systolic   function, EF 77%. 4. There is no transient ischemic dilation. 5. High risk myocardial perfusion study based on LV dysfunction.           1. Acute combined systolic and diastolic CHF   Cardio consult and 2D echo  Will order fluid and salt restriction. Will await cardio input after 2d echo since he might need a lifevest.    2. Hypertension urgency: Currently Poorly controlled. S/p IV labetalol in ED. PRN hydralazine. Cardio started bb/ Monitor vitals and adjust BP meds as needed  tsh wnl  3. Morbid obesity: dietician consulted. Needs outpatient f/u. Encourage to find was to enjoy hearth healthy diet and lifestyle    4. Hyperlipidemia: ldl 118, hdl 37; tg 101: lfts checked. Start rosuvastatin  Ordered a1 c  5. Full code  6. DVT Prophylaxis : Lovenox 40 bid  7. 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 Every effort was made to ensure accuracy; however, inadvertent computerized transcription errors may be present.      Electronically signed by Berta Peterson MD on 3/2/2022 at 7:58 AM

## 2022-03-03 ENCOUNTER — HOSPITAL ENCOUNTER (OUTPATIENT)
Dept: CARDIAC CATH/INVASIVE PROCEDURES | Age: 47
Discharge: HOME | End: 2022-03-03
Attending: INTERNAL MEDICINE | Admitting: INTERNAL MEDICINE
Payer: COMMERCIAL

## 2022-03-03 VITALS
TEMPERATURE: 97.5 F | DIASTOLIC BLOOD PRESSURE: 68 MMHG | WEIGHT: 315 LBS | HEIGHT: 72 IN | HEART RATE: 82 BPM | BODY MASS INDEX: 42.66 KG/M2 | OXYGEN SATURATION: 93 % | SYSTOLIC BLOOD PRESSURE: 178 MMHG | RESPIRATION RATE: 20 BRPM

## 2022-03-03 VITALS
DIASTOLIC BLOOD PRESSURE: 101 MMHG | BODY MASS INDEX: 44.1 KG/M2 | HEIGHT: 71 IN | WEIGHT: 315 LBS | RESPIRATION RATE: 20 BRPM | TEMPERATURE: 97.2 F | SYSTOLIC BLOOD PRESSURE: 169 MMHG | HEART RATE: 82 BPM | OXYGEN SATURATION: 94 %

## 2022-03-03 DIAGNOSIS — I50.41 ACUTE COMBINED SYSTOLIC AND DIASTOLIC CONGESTIVE HEART FAILURE (HCC): Primary | ICD-10-CM

## 2022-03-03 DIAGNOSIS — I42.8 NONISCHEMIC CARDIOMYOPATHY (HCC): ICD-10-CM

## 2022-03-03 DIAGNOSIS — I10 PRIMARY HYPERTENSION: ICD-10-CM

## 2022-03-03 DIAGNOSIS — Z98.890 S/P CARDIAC CATH: ICD-10-CM

## 2022-03-03 LAB
ABO/RH: NORMAL
ANTIBODY SCREEN: NORMAL
HBA1C MFR BLD: 5.9 % (ref 4–5.6)
MAGNESIUM: 2.2 MG/DL (ref 1.6–2.6)

## 2022-03-03 PROCEDURE — 2500000003 HC RX 250 WO HCPCS

## 2022-03-03 PROCEDURE — 93454 CORONARY ARTERY ANGIO S&I: CPT

## 2022-03-03 PROCEDURE — 99239 HOSP IP/OBS DSCHRG MGMT >30: CPT | Performed by: INTERNAL MEDICINE

## 2022-03-03 PROCEDURE — 2580000003 HC RX 258: Performed by: INTERNAL MEDICINE

## 2022-03-03 PROCEDURE — 36415 COLL VENOUS BLD VENIPUNCTURE: CPT

## 2022-03-03 PROCEDURE — C1894 INTRO/SHEATH, NON-LASER: HCPCS

## 2022-03-03 PROCEDURE — 86901 BLOOD TYPING SEROLOGIC RH(D): CPT

## 2022-03-03 PROCEDURE — 83735 ASSAY OF MAGNESIUM: CPT

## 2022-03-03 PROCEDURE — C1769 GUIDE WIRE: HCPCS

## 2022-03-03 PROCEDURE — 86850 RBC ANTIBODY SCREEN: CPT

## 2022-03-03 PROCEDURE — 6370000000 HC RX 637 (ALT 250 FOR IP): Performed by: INTERNAL MEDICINE

## 2022-03-03 PROCEDURE — 6360000002 HC RX W HCPCS

## 2022-03-03 PROCEDURE — 2709999900 HC NON-CHARGEABLE SUPPLY

## 2022-03-03 PROCEDURE — 86900 BLOOD TYPING SEROLOGIC ABO: CPT

## 2022-03-03 PROCEDURE — 93454 CORONARY ARTERY ANGIO S&I: CPT | Performed by: INTERNAL MEDICINE

## 2022-03-03 PROCEDURE — 83036 HEMOGLOBIN GLYCOSYLATED A1C: CPT

## 2022-03-03 RX ORDER — ACETAMINOPHEN 325 MG/1
650 TABLET ORAL EVERY 4 HOURS PRN
Status: DISCONTINUED | OUTPATIENT
Start: 2022-03-03 | End: 2022-03-04 | Stop reason: HOSPADM

## 2022-03-03 RX ORDER — CARVEDILOL 25 MG/1
25 TABLET ORAL 2 TIMES DAILY WITH MEALS
Qty: 60 TABLET | Refills: 3 | Status: SHIPPED | OUTPATIENT
Start: 2022-03-03 | End: 2022-03-04 | Stop reason: SDUPTHER

## 2022-03-03 RX ORDER — HYDRALAZINE HYDROCHLORIDE 25 MG/1
25 TABLET, FILM COATED ORAL EVERY 12 HOURS SCHEDULED
Qty: 90 TABLET | Refills: 3 | Status: SHIPPED | OUTPATIENT
Start: 2022-03-03 | End: 2022-03-03 | Stop reason: HOSPADM

## 2022-03-03 RX ORDER — LISINOPRIL 5 MG/1
20 TABLET ORAL DAILY
Qty: 90 TABLET | Refills: 3 | Status: SHIPPED | OUTPATIENT
Start: 2022-03-03 | End: 2022-03-03 | Stop reason: SDUPTHER

## 2022-03-03 RX ORDER — AMLODIPINE BESYLATE 10 MG/1
10 TABLET ORAL DAILY
Qty: 90 TABLET | Refills: 3 | Status: SHIPPED | OUTPATIENT
Start: 2022-03-03 | End: 2022-03-03 | Stop reason: SDUPTHER

## 2022-03-03 RX ORDER — SODIUM CHLORIDE 0.9 % (FLUSH) 0.9 %
5-40 SYRINGE (ML) INJECTION PRN
Status: DISCONTINUED | OUTPATIENT
Start: 2022-03-03 | End: 2022-03-04 | Stop reason: HOSPADM

## 2022-03-03 RX ORDER — SODIUM CHLORIDE 0.9 % (FLUSH) 0.9 %
5-40 SYRINGE (ML) INJECTION EVERY 12 HOURS SCHEDULED
Status: DISCONTINUED | OUTPATIENT
Start: 2022-03-03 | End: 2022-03-04 | Stop reason: HOSPADM

## 2022-03-03 RX ORDER — AMLODIPINE BESYLATE 10 MG/1
10 TABLET ORAL DAILY
Qty: 90 TABLET | Refills: 3 | Status: SHIPPED | OUTPATIENT
Start: 2022-03-03 | End: 2022-03-04 | Stop reason: ALTCHOICE

## 2022-03-03 RX ORDER — NITROGLYCERIN 0.4 MG/1
TABLET SUBLINGUAL
Qty: 25 TABLET | Refills: 3 | Status: SHIPPED | OUTPATIENT
Start: 2022-03-03 | End: 2022-03-03 | Stop reason: HOSPADM

## 2022-03-03 RX ORDER — LISINOPRIL 5 MG/1
20 TABLET ORAL DAILY
Qty: 90 TABLET | Refills: 3 | Status: SHIPPED | OUTPATIENT
Start: 2022-03-03 | End: 2022-03-04 | Stop reason: ALTCHOICE

## 2022-03-03 RX ORDER — CARVEDILOL 25 MG/1
25 TABLET ORAL 2 TIMES DAILY
Qty: 180 TABLET | Refills: 2 | Status: SHIPPED | OUTPATIENT
Start: 2022-03-03

## 2022-03-03 RX ORDER — ASPIRIN 81 MG/1
81 TABLET, CHEWABLE ORAL DAILY
Qty: 30 TABLET | Refills: 3 | Status: SHIPPED | OUTPATIENT
Start: 2022-03-04

## 2022-03-03 RX ORDER — ISOSORBIDE MONONITRATE 30 MG/1
30 TABLET, EXTENDED RELEASE ORAL DAILY
Qty: 30 TABLET | Refills: 3 | Status: SHIPPED | OUTPATIENT
Start: 2022-03-04 | End: 2022-03-03 | Stop reason: HOSPADM

## 2022-03-03 RX ORDER — ATORVASTATIN CALCIUM 40 MG/1
40 TABLET, FILM COATED ORAL NIGHTLY
Qty: 30 TABLET | Refills: 3 | Status: SHIPPED | OUTPATIENT
Start: 2022-03-03 | End: 2022-03-03 | Stop reason: HOSPADM

## 2022-03-03 RX ORDER — SODIUM CHLORIDE 9 MG/ML
25 INJECTION, SOLUTION INTRAVENOUS PRN
Status: DISCONTINUED | OUTPATIENT
Start: 2022-03-03 | End: 2022-03-04 | Stop reason: HOSPADM

## 2022-03-03 RX ADMIN — CARVEDILOL 25 MG: 25 TABLET, FILM COATED ORAL at 08:13

## 2022-03-03 RX ADMIN — ISOSORBIDE MONONITRATE 30 MG: 30 TABLET, EXTENDED RELEASE ORAL at 08:13

## 2022-03-03 RX ADMIN — Medication 10 ML: at 08:14

## 2022-03-03 RX ADMIN — ASPIRIN 81 MG: 81 TABLET, CHEWABLE ORAL at 08:13

## 2022-03-03 RX ADMIN — HYDRALAZINE HYDROCHLORIDE 25 MG: 25 TABLET, FILM COATED ORAL at 08:13

## 2022-03-03 ASSESSMENT — PAIN SCALES - GENERAL: PAINLEVEL_OUTOF10: 0

## 2022-03-03 NOTE — CARE COORDINATION
3/3/2022 0935 CM note: No covid testing. Pt has new PCP appt with Dr Sirena Sanchez scheduled for March 10,2022. CHF Clinic appt scheduled for 3/22/22. Pt to be transferred to [facility] today for cardiac catheterization.  Christiano SEAMAN

## 2022-03-03 NOTE — PLAN OF CARE
Patient's chart updated to reflect:      .     - HF care plan, HF education points and HF discharge instructions.  -Orders: 2 gram sodium diet, daily weights, I/O  Kb Diane, RN RN  Heart Failure Navigator
Complications related to the disease process, condition or treatment will be avoided or minimized  Outcome: Met This Shift     Problem: Respiratory:  Goal: Ability to maintain adequate ventilation will improve  Description: Ability to maintain adequate ventilation will improve  Outcome: Met This Shift  Goal: Respiratory status will improve  Description: Respiratory status will improve  Outcome: Met This Shift

## 2022-03-03 NOTE — DISCHARGE INSTR - DIET
Good nutrition is important when healing from an illness, injury, or surgery. Follow any nutrition recommendations given to you during your hospital stay. If you were given an oral nutrition supplement while in the hospital, continue to take this supplement at home. You can take it with meals, in-between meals, and/or before bedtime. These supplements can be purchased at most local grocery stores, pharmacies, and chain super-stores. If you have any questions about your diet or nutrition, call the hospital and ask for the dietitian. silvana of Nutrition and Dietetics. This handout may be duplicated for client education. Page 1/6  Prepared For: Date:  Prepared By: Contact:  Heart-Healthy Consistent Carbohydrate Nutrition  Therapy  A heart-healthy and consistent carbohydrate diet is recommended to manage heart disease and diabetes. To follow a heart-healthy and consistent carbohydrate diet,  Eat a balanced diet with whole grains, fruits and vegetables, and lean protein sources. Choose heart-healthy unsaturated fats. Limit saturated fats, trans fats, and cholesterol intake. Eat more plant-based or  vegetarian meals using beans and soy foods for protein. Eat whole, unprocessed foods to limit the amount of sodium (salt) you eat. Choose a consistent amount of carbohydrate at each meal and snack. Limit refined carbohydrates especially sugar,  sweets and sugar-sweetened beverages. If you drink alcohol, do so in moderation: one serving per day (women) and two servings per day (men). o One serving is equivalent to 12 ounces beer, 5 ounces wine, or 1.5 ounces distilled spirits  Tips  Tips for Choosing Heart-Healthy Fats  Choose lean protein and low-fat dairy foods to reduce saturated fat intake. Saturated fat is usually found in animal-based protein and is associated with certain health risks. Saturated fat is the  biggest contributor to raise low-density lipoprotein (LDL) cholesterol levels.  Research shows that limiting saturated fat  lowers unhealthy cholesterol levels. Eat no more than 7% of your total calories each day from saturated fat. Ask your RDN  to help you determine how much saturated fat is right for you. There are many foods that do not contain large amounts of saturated fats. Swapping these foods to replace foods high in  saturated fats will help you limit the saturated fat you eat and improve your cholesterol levels. You can also try eating  more plant-based or vegetarian meals.   Instead of Try:  Whole milk, cheese, yogurt, and ice  cream  1% or skim milk, low-fat cheese, non-fat yogurt,  and low-fat ice cream  Fatty, marbled beef and pork Lean beef, pork, or venison  Poultry with skin Poultry without skin  Butter, stick margarine Reduced-fat, whipped, or liquid spreads

## 2022-03-03 NOTE — PROCEDURES
[address]                  [address] [place],  [facility]                            CARDIAC CATHETERIZATION    PATIENT NAME: Silvestre Hancock                 :        1975  MED REC NO:   [id number]                            ROOM:  ACCOUNT NO:   [de-identified]                           ADMIT DATE: 2022  PROVIDER:     Kan Henry MD    DATE OF PROCEDURE:  2022    PROCEDURE:  Coronary angiography. Indication #17, score of 7. INDICATION FOR PROCEDURE:  The patient is a 49-year-old male with a  newly diagnosed cardiomyopathy who was found to have abnormal stress  test.  The patient was brought to the cath lab to rule out ischemic  etiology for his cardiomyopathy. DESCRIPTION OF PROCEDURE:  After the appropriate informed consent, the  right wrist area was prepped and draped in the usual sterile fashion. A  timeout was called. The right wrist area was locally anesthetized with  2 mL of 2% lidocaine. A 21-gauge needle was used to access the right  radial artery. A 6-Tuvaluan introducer sheath was used to cannulate the  right radial artery. A 6-Tuvaluan JL5 and 6-Tuvaluan multipurpose catheter  were used for left and right coronary angiography, we tried to use JL3.5  and JR4 catheter unsuccessfully. ANGIOGRAPHIC FINDINGS:  1. The left main coronary artery arises normally from the left sinus of  Valsalva. It is a large vessel without any disease. It bifurcates into  left anterior descending artery and left circumflex artery. 2.  The left anterior descending artery is a long vessel, extends down  to the apex. It is mildly diseased in the distal segment. It gives off  a couple of diagonal branches. The second tone is the mid size that has  50% mid segment disease. 3.  The left circumflex artery also is a large vessel, gives off two  large OM branches. The left circumflex artery is mildly tortuous  without any disease.   4.  The right coronary artery has anterior high takeoff. It is ectatic  in the proximal segment. It gives off an acute marginal branch and that  40% proximal segment disease. The vessel is mildly diffuse diseased. At the end of the procedure, the catheter and the wire were pulled out  from the body, the sheath was pulled out from the body and a Vasc Band  was applied to the right wrist area with effective hemostasis. COMPLICATIONS:  None. ESTIMATED TOTAL BLOOD LOSS:  20-30 mL. MEDICATIONS USED DURING THE PROCEDURE:  We used intraarterial verapamil  to prevent vasospasm, we used intraarterial heparin for anticoagulation. CONSCIOUS SEDATION:  We used Versed and fentanyl for conscious sedation. First dose was given at 1430, procedure ended at 1505, there was 35  minutes of direct face-to-face supervision during conscious sedation  administration. Total fluoroscopy time was 7 minutes. Total contrast used was 105 mL of Isovue. IMPRESSION:  1. Mild CAD involving second diagonal branch. 2.  Ectatic proximal RCA with 50% involving a large acute marginal  branch. RECOMMENDATION:  1. Continue current treatment. 2.  Optimize cardiomyopathy treatment. 3.  Blood pressure control. 4.  The patient will be observed for two hours and discharged home if he  meets discharge criteria.         Melida Truong MD    D: 03/03/2022 15:25:11       T: 03/03/2022 16:10:49     [email or web address]  Job#: [id number]     Doc#: [id number]    CC:  Pacheco Umanzor Md

## 2022-03-03 NOTE — DISCHARGE SUMMARY
[facility] Physician Discharge Summary       Erica Bacon MD  [address] [facility] [id number] [phone]      Heart failure follow up and education, Please set a visit within 3-7 days of hospital D/C    [address] [facility]  [address] [facility] [address]  [phone]        family doctor      has vijay made with new family doctor      Activity level: Slowly increase as tolerated    Diet: Diet NPO    Labs: None are pending at the discharge    Condition at discharge: Stable    Dispo:  e for cath    Patient ID:  Marylene Nay  [id number]  73 y.o.  1975    Admit date: 2/28/2022    Discharge date and time:  3/3/2022  9:52 AM    Admission Diagnoses: Principal Problem:    Hypertensive urgency  Active Problems:    Hypertension    Acute combined systolic and diastolic congestive heart failure ([address]  Resolved Problems:    * No resolved hospital problems. *      Discharge Diagnoses: Principal Problem:    Hypertensive urgency  Active Problems:    Hypertension    Acute combined systolic and diastolic congestive heart failure (HCC)  Resolved Problems:    * No resolved hospital problems. *      Consults:  IP CONSULT TO INTERNAL MEDICINE  IP CONSULT TO CARDIOLOGY  IP CONSULT TO HEART FAILURE NURSE/COORDINATOR  IP CONSULT TO DIETITIAN  IP CONSULT TO HEART FAILURE NURSE/COORDINATOR    Procedures: None significant except if described in hospital course. Hospital Course:   History of present illness from History and Physical:              This is a 55 y. o. male  has a past medical history of Acute combined systolic and diastolic congestive heart failure ([address] and Hypertension. presented with Shortness of Breath, Cough, Leg edema for last few days prior to arrival to the hospital. SOB is associated with progressive leg swelling. Initially SOB was mild, intermittent but gradually getting more persistent. Started gradually. Exacerbated by general activity or exertion. Relieved only partially by rest.  The patient was seen and examined at bedside, appears alert and awake with no acute distress and is able to answer simple  questions. On direct questioning, patient denied any  resting ongoing chest pain, hemoptysis, productive cough, fever, ongoing palpitation, active abdominal pain, hematemesis, rectal bleeding, vimal, hematuria, any other  and GI complaints and any new focal neuro deficits.     3/2/22: The myocardial perfusion is abnormal.   2. Fixed defects involving the anterior, anterolateral, and inferior   walls suggestive of prior infarct. No reversible defects to suggest   ischemia. 3. Dilated left ventricle with moderately reduced LV systolic   function, EF 40%. 4. There is no transient ischemic dilation. 5. High risk myocardial perfusion study based on LV dysfunction.             1. Acute combined systolic and diastolic CHF   Cardio consult and 2D echo  While here had fluid and salt restriction. 2d echo    Moderate concentric left ventricular hypertrophy 1.6cm. Ejection fraction is visually estimated at 33 to 36%. Right ventricular not well seen but TAPSE of 2.4cm suggest normal RV   systolic function. Mildly thickened mitral valve leaflets. Mild mitral regurgitation is present. Individual aortic valve leaflets are not clearly visualized. No hemodynamically significant aortic stenosis is present. Mild tricuspid regurgitation. Cardio transferring him to [facility] for cath    2. Hypertension urgency: Currently Poorly controlled. S/p IV labetalol in ED. PRN hydralazine. Cardio started bb. Will need further adjust BP meds adjustment  tsh wnl  3. Morbid obesity: dietician consulted. Needs outpatient f/u. Encourage to find way to enjoy hearth healthy diet and lifestyle     4. Hyperlipidemia: ldl 118, hdl 37; tg 101: lfts checked. 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 Abdomen is soft nontender nondistended no rebound or guarding. Tense edema below knees, more pitting above knees. good peripheral perfusion. No significant rashes or new skin lesions. No new focality on neuro exam which is overall grossly intact. Affect and mood seem to be appropriate     I/O last 3 completed shifts: In: 240 [P.O.:240]  Out: 2275 [Urine:2275]  No intake/output data recorded. LABS:  Recent Labs     02/28/22 2111 03/01/22  0634 03/02/22  0616    140 140   K 3.4* 4.2 3.8    100 98   CO2 30* 31* 28   BUN 14 12 13   CREATININE 1.5* 1.2 1.3*   GLUCOSE 124* 118* 108*   CALCIUM 9.1 9.0 9.1       Recent Labs     02/28/22 2111 03/01/22  0634   WBC 6.1 4.9   RBC 5.89* 5.75   HGB 15.8 15.5   HCT 48.3 47.5   MCV 82.0 82.6   MCH 26.8 27.0   MCHC 32.7 32.6   RDW 15.9* 15.9*    268   MPV 10.0 10.3       No results for input(s): POCGLU in the last 72 hours. Imaging:  No results found.       Patient Instructions:   Current Discharge Medication List      START taking these medications    Details   aspirin 81 MG chewable tablet Take 1 tablet by mouth daily  Qty: 30 tablet, Refills: 3      isosorbide mononitrate (IMDUR) 30 MG extended release tablet Take 1 tablet by mouth daily  Qty: 30 tablet, Refills: 3      nitroGLYCERIN (NITROSTAT) 0.4 MG SL tablet up to max of 3 total doses. If no relief after 1 dose, call 911. Qty: 25 tablet, Refills: 3      atorvastatin (LIPITOR) 40 MG tablet Take 1 tablet by mouth nightly  Qty: 30 tablet, Refills: 3      carvedilol (COREG) 25 MG tablet Take 1 tablet by mouth 2 times daily (with meals)  Qty: 60 tablet, Refills: 3      hydrALAZINE (APRESOLINE) 25 MG tablet Take 1 tablet by mouth every 12 hours  Qty: 90 tablet, Refills: 3               Note that more than 30 minutes was spent in preparing discharge papers, discussing discharge with patient, medication review, etc.    NOTE: This report was transcribed using voice recognition software. Every effort was made to ensure accuracy; however, inadvertent computerized transcription errors may be present.      Signed:  Electronically signed by Destiny Umana MD on 3/3/2022 at 9:52 AM

## 2022-03-04 ENCOUNTER — TELEPHONE (OUTPATIENT)
Dept: CARDIOLOGY CLINIC | Age: 47
End: 2022-03-04

## 2022-03-04 RX ORDER — ISOSORBIDE MONONITRATE 30 MG/1
30 TABLET, EXTENDED RELEASE ORAL DAILY
Qty: 30 TABLET | Refills: 3 | Status: SHIPPED
Start: 2022-03-04 | End: 2022-06-30

## 2022-03-04 RX ORDER — ATORVASTATIN CALCIUM 40 MG/1
40 TABLET, FILM COATED ORAL DAILY
Qty: 30 TABLET | Refills: 5 | Status: SHIPPED
Start: 2022-03-04 | End: 2022-08-24

## 2022-03-04 RX ORDER — HYDRALAZINE HYDROCHLORIDE 25 MG/1
25 TABLET, FILM COATED ORAL 2 TIMES DAILY
Qty: 60 TABLET | Refills: 5 | Status: SHIPPED
Start: 2022-03-04 | End: 2022-04-15 | Stop reason: SDUPTHER

## 2022-03-04 RX ORDER — ATORVASTATIN CALCIUM 40 MG/1
40 TABLET, FILM COATED ORAL DAILY
Qty: 30 TABLET | Refills: 5 | Status: CANCELLED | OUTPATIENT
Start: 2022-03-04

## 2022-03-04 NOTE — TELEPHONE ENCOUNTER
This is the recommendation from my last note at the hospital:  Please let me know if there are questions:    2.  Congestive heart failure, unspecified HF chronicity, unspecified heart failure type ([address]  Newly depressed heart failure with depressed systolic function with EF of 33 to 36%  Is awaiting invasive coronary angiogram with possible PCI tomorrow  N.p.o. after midnight  Discontinued amlodipine and clonidine  Blood pressure is still not adequately controlled and carvedilol was increased to 25 twice a day  Hydralazine and Imdur is added on a stepwise approach  Add Entresto low-dose after cardiac catheterization if kidney function is stable  Continue low-dose diuresis and change to p.o. after cardiac catheterization  Strict in and out monitoring, low-salt diet, daily weight   LifeVest prior to going home  Avoid alcohol if any

## 2022-03-04 NOTE — TELEPHONE ENCOUNTER
Patient calling asking for lisinopril prescription. I see it is on his med list from Dr. Kp Gore yesterday, but your consult indicates you were going to stop it and put patient on carvedilol. Please review and advise.

## 2022-03-04 NOTE — TELEPHONE ENCOUNTER
Hydralazine, Imdur and Entresto were added to patient's med list per Dr. Dick Montana. HFU scheduled. Patient's wife notified.

## 2022-03-07 ENCOUNTER — TELEPHONE (OUTPATIENT)
Dept: NON INVASIVE DIAGNOSTICS | Age: 47
End: 2022-03-07

## 2022-03-07 NOTE — TELEPHONE ENCOUNTER
Medication: Entresto 24-26mg   Sig: BID    Dx: nonischemic cardiomyopathy (I42.5)   EF 33-36%    Provider: Jonelle 69: [address] ph: [phone]

## 2022-03-07 NOTE — TELEPHONE ENCOUNTER
Entresto 24-26mg BID was approved through Cover my Med[employer]. Approved from 02/05/2022 to 03/07/2023. Authorization scanned into [employer].       Case # [id number]

## 2022-03-10 ENCOUNTER — OFFICE VISIT (OUTPATIENT)
Dept: FAMILY MEDICINE CLINIC | Age: 47
End: 2022-03-10
Payer: COMMERCIAL

## 2022-03-10 VITALS
BODY MASS INDEX: 44.1 KG/M2 | TEMPERATURE: 98.1 F | RESPIRATION RATE: 16 BRPM | SYSTOLIC BLOOD PRESSURE: 136 MMHG | WEIGHT: 315 LBS | OXYGEN SATURATION: 97 % | HEART RATE: 66 BPM | HEIGHT: 71 IN | DIASTOLIC BLOOD PRESSURE: 88 MMHG

## 2022-03-10 DIAGNOSIS — E66.01 CLASS 3 SEVERE OBESITY DUE TO EXCESS CALORIES WITH SERIOUS COMORBIDITY AND BODY MASS INDEX (BMI) OF 50.0 TO 59.9 IN ADULT (HCC): ICD-10-CM

## 2022-03-10 DIAGNOSIS — I42.8 NONISCHEMIC CARDIOMYOPATHY (HCC): ICD-10-CM

## 2022-03-10 DIAGNOSIS — Z71.82 EXERCISE COUNSELING: ICD-10-CM

## 2022-03-10 DIAGNOSIS — I50.41 ACUTE COMBINED SYSTOLIC AND DIASTOLIC CONGESTIVE HEART FAILURE (HCC): ICD-10-CM

## 2022-03-10 DIAGNOSIS — G47.10 HYPERSOMNOLENCE: ICD-10-CM

## 2022-03-10 DIAGNOSIS — Z71.3 DIETARY COUNSELING AND SURVEILLANCE: ICD-10-CM

## 2022-03-10 DIAGNOSIS — I15.9 SECONDARY HYPERTENSION: ICD-10-CM

## 2022-03-10 DIAGNOSIS — G47.30 SLEEP-DISORDERED BREATHING: Primary | ICD-10-CM

## 2022-03-10 PROBLEM — E66.813 CLASS 3 SEVERE OBESITY DUE TO EXCESS CALORIES WITH SERIOUS COMORBIDITY AND BODY MASS INDEX (BMI) OF 50.0 TO 59.9 IN ADULT: Status: ACTIVE | Noted: 2022-03-10

## 2022-03-10 PROCEDURE — 90732 PPSV23 VACC 2 YRS+ SUBQ/IM: CPT | Performed by: SURGERY

## 2022-03-10 PROCEDURE — 1111F DSCHRG MED/CURRENT MED MERGE: CPT | Performed by: SURGERY

## 2022-03-10 PROCEDURE — 99496 TRANSJ CARE MGMT HIGH F2F 7D: CPT | Performed by: SURGERY

## 2022-03-10 PROCEDURE — 90471 IMMUNIZATION ADMIN: CPT | Performed by: SURGERY

## 2022-03-10 SDOH — ECONOMIC STABILITY: FOOD INSECURITY: WITHIN THE PAST 12 MONTHS, YOU WORRIED THAT YOUR FOOD WOULD RUN OUT BEFORE YOU GOT MONEY TO BUY MORE.: NEVER TRUE

## 2022-03-10 SDOH — ECONOMIC STABILITY: HOUSING INSECURITY
IN THE LAST 12 MONTHS, WAS THERE A TIME WHEN YOU DID NOT HAVE A STEADY PLACE TO SLEEP OR SLEPT IN A SHELTER (INCLUDING NOW)?: NO

## 2022-03-10 SDOH — ECONOMIC STABILITY: FOOD INSECURITY: WITHIN THE PAST 12 MONTHS, THE FOOD YOU BOUGHT JUST DIDN'T LAST AND YOU DIDN'T HAVE MONEY TO GET MORE.: NEVER TRUE

## 2022-03-10 SDOH — ECONOMIC STABILITY: TRANSPORTATION INSECURITY
IN THE PAST 12 MONTHS, HAS LACK OF TRANSPORTATION KEPT YOU FROM MEETINGS, WORK, OR FROM GETTING THINGS NEEDED FOR DAILY LIVING?: NO

## 2022-03-10 SDOH — ECONOMIC STABILITY: INCOME INSECURITY: IN THE LAST 12 MONTHS, WAS THERE A TIME WHEN YOU WERE NOT ABLE TO PAY THE MORTGAGE OR RENT ON TIME?: NO

## 2022-03-10 SDOH — ECONOMIC STABILITY: TRANSPORTATION INSECURITY
IN THE PAST 12 MONTHS, HAS THE LACK OF TRANSPORTATION KEPT YOU FROM MEDICAL APPOINTMENTS OR FROM GETTING MEDICATIONS?: NO

## 2022-03-10 ASSESSMENT — PATIENT HEALTH QUESTIONNAIRE - PHQ9
SUM OF ALL RESPONSES TO PHQ9 QUESTIONS 1 & 2: 0
SUM OF ALL RESPONSES TO PHQ QUESTIONS 1-9: 0
1. LITTLE INTEREST OR PLEASURE IN DOING THINGS: 0
SUM OF ALL RESPONSES TO PHQ QUESTIONS 1-9: 0
2. FEELING DOWN, DEPRESSED OR HOPELESS: 0

## 2022-03-10 ASSESSMENT — LIFESTYLE VARIABLES
HOW OFTEN DO YOU HAVE A DRINK CONTAINING ALCOHOL: 2-4 TIMES A MONTH
HOW MANY STANDARD DRINKS CONTAINING ALCOHOL DO YOU HAVE ON A TYPICAL DAY: 3 OR 4

## 2022-03-10 ASSESSMENT — SOCIAL DETERMINANTS OF HEALTH (SDOH): HOW HARD IS IT FOR YOU TO PAY FOR THE VERY BASICS LIKE FOOD, HOUSING, MEDICAL CARE, AND HEATING?: NOT HARD AT ALL

## 2022-03-10 NOTE — PROGRESS NOTES
Fernie Reyes (:  1975) is a 55 y.o. male,New patient, here for evaluation of the following chief complaint(s):  Establish Care, Follow-Up from Hospital (HTN), and Health Maintenance (Due for Flu, Tdap, Pneumococcal, Hep C, HIV, Colorectal)         ASSESSMENT/PLAN:  1. Sleep-disordered breathing  -     Home Sleep Study; Future  -     ND DISCHARGE MEDS RECONCILED W/ CURRENT OUTPATIENT MED LIST  2. Hypersomnolence  -     Home Sleep Study; Future  -     ND DISCHARGE MEDS RECONCILED W/ CURRENT OUTPATIENT MED LIST  3. Nonischemic cardiomyopathy ([facility] [address]  -     ND DISCHARGE MEDS RECONCILED W/ CURRENT OUTPATIENT MED LIST  4. Acute combined systolic and diastolic congestive heart failure (HCC)  -     ND DISCHARGE MEDS RECONCILED W/ CURRENT OUTPATIENT MED LIST  5. Secondary hypertension  -     ND DISCHARGE MEDS RECONCILED W/ CURRENT OUTPATIENT MED LIST  6. Class 3 severe obesity due to excess calories with serious comorbidity and body mass index (BMI) of 50.0 to 59.9 in adult ([facility] [address]  7. Dietary counseling and surveillance  Assessment & Plan:      Counseled the patient on diet, continue to make positive choices. It is important to focus on meeting food group needs with nutrient dense foods and stay within caloric limits. Nutrient dense foods will provide you with vitamins, minerals, and other health promoting nutrients. You should avoid added sugars, saturated fats, hydrogenated oils, and limit sodium intake (this isn't just table salt. .. turn the package over, read the label, stay under 2000 mg or 2g of total sodium daily). Track your calories, this will help you get an understanding of what a proper portion size is.       Every day you should eat these:  -Veggies of all types  -Fruits  -Grains (strive for at least half of these to be whole-grain)  -Lean protein (poultry, fish, legumes, nuts)    Stick to the plate diet.    -45% of your dinner plate should be leafy green vegetables, dark green, red and orange vegetables. Do not use high-calorie dressing is a ranch or dressings that are filled with added sugars. 25% of your dinner plate should be whole grains. The remaining 25% of your dinner plate should be a lean protein. Limit your red meat intake to once per week and no more than 4 ounces in any serving.  -No need for second helpings. Limit or avoid these foods:  -Added sugars (less than 10% of your total calories in any given day should be from sugars)  -Saturated fats and hydrogenated oils (less than 10% of your total calories in any given day should be from these)  -Sodium (less than 2300 mg/day)  -Alcoholic beverages (even in moderation, alcohol can cause long-term health issues involving hypertension, electrolyte abnormalities, liver disease and even cancers of the mouth and throat)    Stay hydrated. Unless instructed otherwise by your physician, you should strive to drink at least eight 8 ounce glasses of water daily, some of these being fortified with electrolytes (such as a Pedialyte, or low calorie sports drink). Again, avoid added sugars. 8. Exercise counseling  Assessment & Plan:        Only to begin exercise regimen as directed by cardiologist.      Return in 1 month (on 4/10/2022). 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 ED Visit: he was walking from his car to his house, up a flight of stairs and became short of breath, some chest discomfort and thought he was having a heart attack or symptoms of covid. Patient had a troponinemia abnormal EKG. Stress testing demonstrated abnormal myocardial perfusion involving a fixed defect along the anterior, anterolateral and inferior walls suggesting prior infarct. There is no notable reversible defect to suggest ischemia. Left ventricle was dilated with LVEF estimated 34%.   PCI was performed impression was mild coronary artery disease involving the second diagonal branch, ectatic proximal RCA with 50% involving large acute marginal branch. Official recommendations were to continue with medical optimization, aggressive blood pressure control, close follow-up as an outpatient with cardiology. He is taking a baby aspirin every day. Question the patient whether or not he has a chewable tablet versus an enteric version of the tablet. Recommend that he  a version that says EC enteric-coated. Imdur 30 mg daily, Nitrostat sublingual as needed up to a total of 3 doses, if reaching for the second dose you are calling 911. Carvedilol 25 mg twice daily (breakfast and supper). Hydralazine 25 mg breakfast and supper (ideally every 12 hours). Getting blood pressure cuff at home to take pressures if systolic blood pressures consistently greater than 298 and diastolic blood pressure is greater than 90 call to the office and we will probably make a change with his hydralazine increasing to t 3 times daily. Sees cardiology early April. ASCVD Calc: Moderate intensity statin is recommended for patients with LDL-C  mg/dL (1.7 to 4.8 mmol/L). Presence of risk enhancing factors favor initiation or intensification of statin therapy (IIa, B-R). LDL-C should be reduced by at least 30%, (I,A). Sleep Disordered Breathing: Heroic snoring, wakes his wife up. Witnessed apnea. Wakes with dry mouth. Sleep is nonrestorative. Can easily sleep during the day if given the chance, snoozes during television. ESS 11. Full sleep ROS not performed. Patient is reviewed and the patient is taking them as directed by his cardiologist.  There is an outstanding basic metabolic panel that I want the patient to have today.   Preventative:  Health Maintenance   Topic Date Due    Hepatitis C screen  Never done    HIV screen  Never done    DTaP/Tdap/Td vaccine (1 - Tdap) Never done    Colorectal Cancer Screen  Never done    Flu vaccine (1) Never done    Lipid screen 03/02/2023    A1C test (Diabetic or Prediabetic)  03/03/2023    Depression Screen  03/10/2023    Pneumococcal 0-64 years Vaccine (2 of 2 - PPSV23) 12/22/2040    COVID-19 Vaccine  Completed    Hepatitis A vaccine  Aged Out    Hepatitis B vaccine  Aged Out    Hib vaccine  Aged Out    Meningococcal (ACWY) vaccine  Aged Out     Will further delve into preventative medicine once patient's status post myocardial infarction, ischemic cardiomyopathy congestive heart failure and his heart failure with reduced ejection fraction has been stabilized. We are going to make her best attempt to aggressively optimize his risk factors. He is going to monitor his daily weights, keeping an electronic record, reporting to the office if he gains more than 3 pounds in 1 day or 5 pounds in a week. He is going to use activity tracking and is encouraged to walk but not vigorously never to the point of shortness of breath never to the point of great exertion/diaphoresis/increased perceived physical effort until he is cleared to do so by his cardiologist.  He is going to use calorie tracking software (my fitness pal), using the pain version in order to track his daily sodium intake and strive for a goal of less than 2000 mg/day as well as calculating his basal metabolic rate for a sedentary lifestyle and with a weight loss goal of 6 pounds per month minimum. Ultimate goal is to be below 300 pounds over the next year at his next office visit we are going to review his calorie tracking and sodium tracking information and make sure that he stay on target. ROS:    Denies 10pt ROS other than noted in HPI. Objective       PHYSICAL EXAM:    /88 (Site: Right Upper Arm)   Pulse 66   Temp 98.1 °F (36.7 °C) (Temporal)   Resp 16   Ht 5' 11\" (1.803 m)   Wt (!) 377 lb 12.8 oz (171.4 kg)   SpO2 97%   BMI 52.69 kg/m²     AVSS    GA: Well-groomed, appears well, no acute distress. HEENT: Atraumatic normocephalic. Extraocular muscles are grossly intact. Pupils are equal round reactive to light. Conjunctiva pink and moist.  Hearing is grossly intact. Nares patent without external drainage. Buccal mucosa pink and moist.  Mallampati score of 4. Neck circumference is greater than 17 inches. Posterior oropharynx clear without lesion or exudate. NECK: Trachea is midline, supple, nontender, no lymphadenopathy. CARDIO: Regular rate and rhythm without murmur rub or gallop, S1-S2 appear normal, no appreciable S3 or S4 and this is using an electronic stethoscope. Cap refill 2+. Radial pulses 2+ bilaterally. No clubbing. No JVD. No hepatojugular reflux. RESPIRATORY: Clear to auscultation bilaterally without wheezes rales or rhonchi. Normal inspiratory and expiratory effort. Normoxic on room air    ABD: Obese, normoactive bowel sounds. Soft, nontender, no organomegaly. MSK: Structurally appropriate for age. No gross deficit. NEURO: Alert, no gross deficit. PSYCH:  Mood is normal and congruent with affect. No signs of psychomotor retardation or agitation. Thought content seems normal, speech is fluent and non-pressured. SKIN: Generally warm pink and dry. Tattoos. No plethora. No clubbing or cyanosis. Renal: Recorded the patient he is having a \"normal \"output every 3-4 hours. At discharge from the hospital his BUN creatinine was 13/1.3 GFR was normal.           Post-Discharge Transitional Care Follow Up      Mallory Harris   YOB: 1975    Date of Office Visit:  3/10/2022  Date of Hospital Admission: 2/28/22  Date of Hospital Discharge: 3/3/22  Readmission Risk Score (high >=14%.  Medium >=10%):Readmission Risk Score: 7.5 ( )      Care management risk score Rising risk (score 2-5) and Complex Care (Scores >=6): 1     Non face to face  following discharge, date last encounter closed (first attempt may have been earlier): *No documented post hospital discharge outreach found in the last 14 days     Call initiated 2 business days of discharge: *No response recorded in the last 14 days     Sleep-disordered breathing  -     Home Sleep Study; Future  -     HI DISCHARGE MEDS RECONCILED W/ CURRENT OUTPATIENT MED LIST  Hypersomnolence  -     Home Sleep Study; Future  -     HI DISCHARGE MEDS RECONCILED W/ CURRENT OUTPATIENT MED LIST  Nonischemic cardiomyopathy ([address]  -     HI DISCHARGE MEDS RECONCILED W/ CURRENT OUTPATIENT MED LIST  Acute combined systolic and diastolic congestive heart failure (HCC)  -     HI DISCHARGE MEDS RECONCILED W/ CURRENT OUTPATIENT MED LIST  Secondary hypertension  -     HI DISCHARGE MEDS RECONCILED W/ CURRENT OUTPATIENT MED LIST  Class 3 severe obesity due to excess calories with serious comorbidity and body mass index (BMI) of 50.0 to 59.9 in adult [facility])  Dietary counseling and surveillance  Assessment & Plan:      Counseled the patient on diet, continue to make positive choices. It is important to focus on meeting food group needs with nutrient dense foods and stay within caloric limits. Nutrient dense foods will provide you with vitamins, minerals, and other health promoting nutrients. You should avoid added sugars, saturated fats, hydrogenated oils, and limit sodium intake (this isn't just table salt. .. turn the package over, read the label, stay under 2300 mg or 2.3 g of total sodium daily). Track your calories, this will help you get an understanding of what a proper portion size is. Every day you should eat these:  -Veggies of all types  -Fruits  -Grains (strive for at least half of these to be whole-grain)  -Lean protein (poultry, fish, legumes, nuts)    Stick to the plate diet.    -83% of your dinner plate should be leafy green vegetables, dark green, red and orange vegetables. Do not use high-calorie dressing is a ranch or dressings that are filled with added sugars. 25% of your dinner plate should be whole grains.   The remaining 25% of your dinner plate should be a lean protein. Limit your red meat intake to once per week and no more than 4 ounces in any serving.  -No need for second helpings. Limit or avoid these foods:  -Added sugars (less than 10% of your total calories in any given day should be from sugars)  -Saturated fats and hydrogenated oils (less than 10% of your total calories in any given day should be from these)  -Sodium (less than 2300 mg/day)  -Alcoholic beverages (even in moderation, alcohol can cause long-term health issues involving hypertension, electrolyte abnormalities, liver disease and even cancers of the mouth and throat)    Stay hydrated. Unless instructed otherwise by your physician, you should strive to drink at least eight 8 ounce glasses of water daily, some of these being fortified with electrolytes (such as a Pedialyte, or low calorie sports drink). Again, avoid added sugars. Exercise counseling  Assessment & Plan:        Only to begin exercise regimen as directed by cardiologist.      Medical Decision Making: high complexity  Return in 1 month (on 4/10/2022). Subjective:     Inpatient course: Discharge summary reviewed- see chart.     Interval history/Current status: HPI above    Patient Active Problem List   Diagnosis    Hypertensive urgency    Hypertension    Acute combined systolic and diastolic congestive heart failure (HCC)    S/P cardiac cath    Nonischemic cardiomyopathy (HCC)    Class 3 severe obesity due to excess calories with serious comorbidity and body mass index (BMI) of 50.0 to 59.9 in adult [facility])    Dietary counseling and surveillance    Exercise counseling       Medications listed as ordered at the time of discharge from hospital  [unfilled]     Medications marked \"taking\" at this time  Outpatient Medications Marked as Taking for the 3/10/22 encounter (Office Visit) with Ron Calderon,    Medication Sig Dispense Refill    sacubitril-valsartan (ENTRESTO) 24-26 MG per tablet Take 1 tablet by mouth 2 times daily 60 tablet 3    hydrALAZINE (APRESOLINE) 25 MG tablet Take 1 tablet by mouth in the morning and at bedtime 60 tablet 5    atorvastatin (LIPITOR) 40 MG tablet Take 1 tablet by mouth daily 30 tablet 5    aspirin 81 MG chewable tablet Take 1 tablet by mouth daily 30 tablet 3    carvedilol (COREG) 25 MG tablet Take 1 tablet by mouth 2 times daily 180 tablet 2        Medications patient taking as of now reconciled against medications ordered at time of hospital discharge: Yes    Review of Systems    Objective:    /88 (Site: Right Upper Arm)   Pulse 66   Temp 98.1 °F (36.7 °C) (Temporal)   Resp 16   Ht 5' 11\" (1.803 m)   Wt (!) 377 lb 12.8 oz (171.4 kg)   SpO2 97%   BMI 52.69 kg/m²   Physical Exam    An electronic signature was used to authenticate this note.   --Anne Mukherjee, DO

## 2022-03-10 NOTE — PATIENT INSTRUCTIONS
Patient Education     Good.       3# in one day or 5# in one week call. Learning About Cutting Calories  How do calories affect your weight? Food gives your body energy. Energy from the food you eat is measured in calories. This energy keeps your heart beating, your brain active, and your muscles working. Your body needs a certain number of calories each day. After your body uses the calories it needs, it stores extra calories as fat. To lose weight safely, you have to eat fewer calories while eating in a healthy way. How many calories do you need each day? The more active you are, the more calories you need. When you are less active, you need fewer calories. How many calories you need each day also depends on several things, including your age and whether you are male or female. Here are some general guidelines for adults:  · Less active women and older adults need 1,600 to 2,000 calories each day. · Active women and less active men need 2,000 to 2,400 calories each day. · Active men need 2,400 to 3,000 calories each day. How can you cut calories and eat healthy meals? Whole grains, vegetables and fruits, and dried beans are good lower-calorie foods. They give you lots of nutrients and fiber. And they fill you up. Sweets, energy drinks, and soda pop are high in calories. They give you few nutrients and no fiber. Try to limit soda pop, fruit juice, and energy drinks. Drink water instead. Some fats can be part of a healthy diet. But cutting back on fats from highly processed foods like fast foods and many snack foods is a good way to lower the calories in your diet. Also, use smaller amounts of fats like butter, margarine, salad dressing, and mayonnaise. Add fresh garlic, lemon, or herbs to your meals to add flavor without adding fat. Meats and dairy products can be a big source of hidden fats. Try to choose lean or low-fat versions of these products.   Fat-free cookies, candies, chips, and frozen treats can still be high in sugar and calories. Some fat-free foods have more calories than regular ones. Eat fat-free treats in moderation, as you would other foods. If your favorite foods are high in fat, salt, sugar, or calories, limit how often you eat them. Eat smaller servings, or look for healthy substitutes. Fill up on fruits, vegetables, and whole grains. Eating at home  · Use meat as a side dish instead of as the main part of your meal.  · Try main dishes that use whole wheat pasta, brown rice, dried beans, or vegetables. · Find ways to cook with little or no fat, such as broiling, steaming, or grilling. · Use cooking spray instead of oil. If you use oil, use a monounsaturated oil, such as canola or olive oil. · Trim fat from meats before you cook them. · Drain off fat after you brown the meat or while you roast it. · Chill soups and stews after you cook them. Then skim the fat off the top after it hardens. Eating out  · Order foods that are broiled or poached rather than fried or breaded. · Cut back on the amount of butter or margarine that you use on bread. · Order sauces, gravies, and salad dressings on the side, and use only a little. · When you order pasta, choose tomato sauce rather than cream sauce. · Ask for salsa with your baked potato instead of sour cream, butter, cheese, or mccormack. · Order meals in a small size instead of upgrading to a large. · Share an entree, or take part of your food home to eat as another meal.  · Share appetizers and desserts. Where can you learn more? Go to https://Comfy.healthShangby. org and sign in to your [employer] account. Enter Q539 in the [employer] box to learn more about \"Learning About Cutting Calories. \"     If you do not have an account, please click on the \"Sign Up Now\" link. Current as of: September 8, 2021               Content Version: 13.1  © 2964-3585 Healthwise, Incorporated.    Care instructions adapted under license by [facility] ([facility]). If you have questions about a medical condition or this instruction, always ask your healthcare professional. [address] any warranty or liability for your use of this information. Patient Education        Low Sodium Diet (2,000 Milligram): Care Instructions  Overview     Limiting sodium can be an important part of managing some health problems. The most common source of sodium is salt. People get most of the salt in their diet from canned, prepared, and packaged foods. Fast food and restaurant meals also are very high in sodium. Your doctor will probably limit your sodium to less than 2,000 milligrams (mg) a day. This limit counts all the sodium in prepared and packaged foods and any salt you add to your food. Follow-up care is a key part of your treatment and safety. Be sure to make and go to all appointments, and call your doctor if you are having problems. It's also a good idea to know your test results and keep a list of the medicines you take. How can you care for yourself at home? Read food labels  · Read labels on cans and food packages. The labels tell you how much sodium is in each serving. Make sure that you look at the serving size. If you eat more than the serving size, you have eaten more sodium. · Food labels also tell you the Percent Daily Value for sodium. Choose products with low Percent Daily Values for sodium. · Be aware that sodium can come in forms other than salt, including monosodium glutamate (MSG), sodium citrate, and sodium bicarbonate (baking soda). MSG is often added to Asian food. When you eat out, you can sometimes ask for food without MSG or added salt. Buy low-sodium foods  · Buy foods that are labeled \"unsalted\" (no salt added), \"sodium-free\" (less than 5 mg of sodium per serving), or \"low-sodium\" (140 mg or less of sodium per serving). Foods labeled \"reduced-sodium\" and \"light sodium\" may still have too much sodium.  Be sure to read the label to see how much sodium you are getting. · Buy fresh vegetables, or frozen vegetables without added sauces. Buy low-sodium versions of canned vegetables, soups, and other canned goods. Prepare low-sodium meals  · Cut back on the amount of salt you use in cooking. This will help you adjust to the taste. Do not add salt after cooking. One teaspoon of salt has about 2,300 mg of sodium. · Take the salt shaker off the table. · Flavor your food with garlic, lemon juice, onion, vinegar, herbs, and spices. Do not use soy sauce, lite soy sauce, steak sauce, onion salt, garlic salt, celery salt, or ketchup on your food. · Use low-sodium salad dressings, sauces, and ketchup. Or make your own salad dressings and sauces without adding salt. · Use less salt (or none) when recipes call for it. You can often use half the salt a recipe calls for without losing flavor. Other foods such as rice, pasta, and grains do not need added salt. · Rinse canned vegetables, and cook them in fresh water. This removes somebut not allof the salt. · Avoid water that is naturally high in sodium or that has been treated with water softeners, which add sodium. If you buy bottled water, read the label and choose a sodium-free brand. Avoid high-sodium foods  · Avoid eating:  ? Smoked, cured, salted, and canned meat, fish, and poultry. ? Ham, mccormack, hot dogs, and luncheon meats. ? Regular, hard, and processed cheese and regular peanut butter. ? Crackers with salted tops, and other salted snack foods such as pretzels, chips, and salted popcorn. ? Frozen prepared meals, unless labeled low-sodium. ? Canned and dried soups, broths, and bouillon, unless labeled sodium-free or low-sodium. ? Canned vegetables, unless labeled sodium-free or low-sodium. ? Marcela Jennifer fries, pizza, tacos, and other fast foods. ? Pickles, olives, ketchup, and other condiments, especially soy sauce, unless labeled sodium-free or low-sodium.   Where can you learn more? Go to https://chpepiceweb.[employer]Q [employer]. org and sign in to your [employer] account. Enter W134 in the [facility] box to learn more about \"Low Sodium Diet (2,000 Milligram): Care Instructions. \"     If you do not have an account, please click on the \"Sign Up Now\" link. Current as of: September 8, 2021               Content Version: 13.1  © 2006-2021 Healthwise, Incorporated. Care instructions adapted under license by [facility] ([facility] If you have questions about a medical condition or this instruction, always ask your healthcare professional. [address] any warranty or liability for your use of this information.

## 2022-03-11 NOTE — ASSESSMENT & PLAN NOTE
Counseled the patient on diet, continue to make positive choices. It is important to focus on meeting food group needs with nutrient dense foods and stay within caloric limits. Nutrient dense foods will provide you with vitamins, minerals, and other health promoting nutrients. You should avoid added sugars, saturated fats, hydrogenated oils, and limit sodium intake (this isn't just table salt. .. turn the package over, read the label, stay under 2300 mg or 2.3 g of total sodium daily). Track your calories, this will help you get an understanding of what a proper portion size is. Every day you should eat these:  -Veggies of all types  -Fruits  -Grains (strive for at least half of these to be whole-grain)  -Lean protein (poultry, fish, legumes, nuts)    Stick to the plate diet.    -57% of your dinner plate should be leafy green vegetables, dark green, red and orange vegetables. Do not use high-calorie dressing is a ranch or dressings that are filled with added sugars. 25% of your dinner plate should be whole grains. The remaining 25% of your dinner plate should be a lean protein. Limit your red meat intake to once per week and no more than 4 ounces in any serving.  -No need for second helpings. Limit or avoid these foods:  -Added sugars (less than 10% of your total calories in any given day should be from sugars)  -Saturated fats and hydrogenated oils (less than 10% of your total calories in any given day should be from these)  -Sodium (less than 2300 mg/day)  -Alcoholic beverages (even in moderation, alcohol can cause long-term health issues involving hypertension, electrolyte abnormalities, liver disease and even cancers of the mouth and throat)    Stay hydrated. Unless instructed otherwise by your physician, you should strive to drink at least eight 8 ounce glasses of water daily, some of these being fortified with electrolytes (such as a Pedialyte, or low calorie sports drink).   Again, avoid added sugars.

## 2022-03-22 ENCOUNTER — TELEPHONE (OUTPATIENT)
Dept: OTHER | Age: 47
End: 2022-03-22

## 2022-03-22 NOTE — TELEPHONE ENCOUNTER
Pt was a no call/no show for today's appt at [facility] CHF clinic. I called pt who stated he forgot about this appt. Pt is still agreeable to establish at the clinic so appt rescheduled for Thursday march 24th at 0730.   Kirsten Wesley RN        Future Appointments   Date Time Provider Echo Dias   3/24/2022  7:30 AM [facility] CHF ROOM 1 [facility]   4/7/2022  8:40 AM Kristen Patton MD [address] [facility]   4/14/2022  1:15 PM Tomi Damon  [facility]

## 2022-03-23 ENCOUNTER — TELEPHONE (OUTPATIENT)
Dept: SLEEP CENTER | Age: 47
End: 2022-03-23

## 2022-03-24 ENCOUNTER — HOSPITAL ENCOUNTER (OUTPATIENT)
Dept: OTHER | Age: 47
Setting detail: THERAPIES SERIES
Discharge: HOME | End: 2022-03-24
Payer: COMMERCIAL

## 2022-03-24 VITALS
OXYGEN SATURATION: 97 % | RESPIRATION RATE: 18 BRPM | SYSTOLIC BLOOD PRESSURE: 138 MMHG | HEIGHT: 71 IN | DIASTOLIC BLOOD PRESSURE: 88 MMHG | WEIGHT: 315 LBS | HEART RATE: 71 BPM | BODY MASS INDEX: 44.1 KG/M2

## 2022-03-24 PROCEDURE — 99214 OFFICE O/P EST MOD 30 MIN: CPT

## 2022-03-24 PROCEDURE — 99204 OFFICE O/P NEW MOD 45 MIN: CPT

## 2022-03-24 NOTE — PROGRESS NOTES
Congestive Heart Failure [address]   1975    Referring Provider: Dr. Ranjan Goetz  Primary Care Physician: Dr. Aline Kolb  Cardiologist: Dr. Ranjan Goetz    History of Present Illness:     Leslie Helton is a 55 y.o. male with a history of HFrEF, most recent EF 33-36% from HANNA 2-. Patient Story:    He does not  have dyspnea with exertion, shortness of breath, or decline in overall functional capacity. He does not have orthopnea, PND, nocturnal cough or hemoptysis. He does not have abdominal distention or bloating, early satiety, anorexia/change in appetite. He does has a good urinary response to  oral diuretic. He does not have  lower extremity edema. He denies lightheadedness, dizziness. He denies palpitations, syncope or near syncope. He does not complain of chest pain, pressure, discomfort. No Known Allergies    Prior to Visit Medications    Medication Sig Taking?  Authorizing Provider   isosorbide mononitrate (IMDUR) 30 MG extended release tablet Take 1 tablet by mouth daily  Sarah Gordon MD   sacubitril-valsartan (ENTRESTO) 24-26 MG per tablet Take 1 tablet by mouth 2 times daily  Sarah Gordon MD   hydrALAZINE (APRESOLINE) 25 MG tablet Take 1 tablet by mouth in the morning and at bedtime  Sarah Gordon MD   atorvastatin (LIPITOR) 40 MG tablet Take 1 tablet by mouth daily  Sarah Gordon MD   aspirin 81 MG chewable tablet Take 1 tablet by mouth daily  Vida Porter MD   carvedilol (COREG) 25 MG tablet Take 1 tablet by mouth 2 times daily  Yossi Solis MD       Guideline directed medical:  ARNI/ACE I/ARB: Yes (entresto)  Beta blocker:  Yes (coreg)  Aldosterone antagonist:  No    Physical Examination:     Pulse 71   Resp 18   Ht 5' 11\" (1.803 m)   Wt (!) 360 lb 8 oz (163.5 kg)   SpO2 97%   BMI 50.28 kg/m²     Assessment  Charting Type: Shift assessment    Neurological  Level of Consciousness: Alert (0)  Sensation RLE: Full sensation  Sensation LLE: Full sensation    HEENT  HEENT (WDL): Within Defined Limits    Respiratory  Respiratory Pattern: Regular  Respiratory Depth: Normal  Respiratory Quality/Effort: Dyspnea with exertion  Chest Assessment: Chest expansion symmetrical  L Breath Sounds: Clear,Diminished  R Breath Sounds: Clear,Diminished    Cardiac  Cardiac Regularity: Regular  Heart Sounds: S1, S2    Rhythm Interpretation  Pulse: 71    Cardiac Monitor  Telemetry Monitor On: Yes  Telemetry Audible: Yes  Telemetry Alarms Set: Yes    Peripheral Vascular  Peripheral Vascular (WDL): Exceptions to WDL  Edema: Right lower extremity,Left lower extremity  RLE Edema: Trace  LLE Edema: Trace    Genitourinary  Genitourinary (WDL): Within Defined Limits    Psychosocial  Psychosocial (WDL): Within Defined Limits    Pulse: 71    Urine Assessment  Incontinence: No  Urine Color: Yellow/straw  Urine Appearance: Clear  Urine Odor: No odor    LAB DATA:    Last 3 BMP      Sodium (mmol/L)   Date Value   03/02/2022 140   03/01/2022 140   02/28/2022 143     Potassium (mmol/L)   Date Value   03/02/2022 3.8   02/28/2022 3.4 (L)     Potassium reflex Magnesium (mmol/L)   Date Value   03/01/2022 4.2     Chloride (mmol/L)   Date Value   03/02/2022 98   03/01/2022 100   02/28/2022 103     CO2 (mmol/L)   Date Value   03/02/2022 28   03/01/2022 31 (H)   02/28/2022 30 (H)     BUN (mg/dL)   Date Value   03/02/2022 13   03/01/2022 12   02/28/2022 14     Glucose (mg/dL)   Date Value   03/02/2022 108 (H)   03/01/2022 118 (H)   02/28/2022 124 (H)     Calcium (mg/dL)   Date Value   03/02/2022 9.1   03/01/2022 9.0   02/28/2022 9.1     Last 3 BNP       Pro-BNP (pg/mL)   Date Value   02/28/2022 1,313 (H)      CBC: No results for input(s): WBC, HGB, PLT in the last 72 hours. BMP:  No results for input(s): NA, K, CL, CO2, BUN, CREATININE, GLUCOSE in the last 72 hours.   Hepatic: No results for input(s): AST, ALT, ALB, BILITOT, ALKPHOS in the last 72 hours. Troponin: No results for input(s): TROPONINI in the last 72 hours. BNP: No results for input(s): BNP in the last 72 hours. Lipids: No results for input(s): CHOL, HDL in the last 72 hours. Invalid input(s): LDLCALCU  INR: No results for input(s): INR in the last 72 hours. WEIGHTS:    Wt Readings from Last 3 Encounters:   03/24/22 (!) 360 lb 8 oz (163.5 kg)   03/10/22 (!) 377 lb 12.8 oz (171.4 kg)   03/03/22 (!) 376 lb (170.6 kg)     TELEMETRY:  Cardiac Regularity: Regular  Cardiac Rhythm/Interpretation: SR    ASSESSMENT:  Mary Garcia is evolemic with weight loss since hospital D/C. Per pt he is feeling great. He denies any shortness of breath, orthopnea, cough, dizziness/lightheadedness. He continues to take low dose entresto. He urinates around 8x during the day and 1-2x at night. Urine is usually light yellow in color. He does follow a low sodium diet and has cut back on fluids. He is unable to verbalize how many ounces of fluid he drinks per day so measured cup provided and pt will monitor. Pt saw family dr post [facility] on march 9th. One week FU appt scheduled for the clinic. Pt also has upcoming appt with DR. Gordon on April 7th. Interventions completed this visit:  IV diuretics given no   Lab work obtained no--pt to have labs drawn at next week's appt. Reviewed currently prescribed medications with patient, educated on importance of compliance and answered any questions regarding their medication  Educated on signs and symptoms of HF  Educated on low sodium diet    PLAN:  Scheduled to follow up in CHF clinic on     Future Appointments   Date Time Provider Echo Dias   3/31/2022  7:00 AM [facility] CHF ROOM [address] [facility]   4/7/2022  8:40 AM Debbora Sandhoff Kathaleen Postin, MD [address] [facility]   4/14/2022  1:15 PM Gina Truong  [address]       Given clinic phone number and aware of signs and symptoms to call with any HF change in symptoms.

## 2022-03-24 NOTE — PLAN OF CARE
Problem:  Activity:  Goal: Capacity to carry out activities will improve  Description: Capacity to carry out activities will improve  Outcome: Ongoing  Goal: Will verbalize the importance of balancing activity with adequate rest periods  Description: Will verbalize the importance of balancing activity with adequate rest periods  Outcome: Ongoing     Problem: Cardiac:  Goal: Hemodynamic stability will improve  Description: Hemodynamic stability will improve  Outcome: Ongoing  Goal: Ability to maintain an adequate cardiac output will improve  Description: Ability to maintain an adequate cardiac output will improve  Outcome: Ongoing     Problem: Fluid Volume:  Goal: Risk for excess fluid volume will decrease  Description: Risk for excess fluid volume will decrease  Outcome: Ongoing  Goal: Maintenance of adequate hydration will improve  Description: Maintenance of adequate hydration will improve  Outcome: Ongoing  Goal: Will show no signs and symptoms of electrolyte imbalance  Description: Will show no signs and symptoms of electrolyte imbalance  Outcome: Ongoing     Problem: Health Behavior:  Goal: Ability to manage health-related needs will improve  Description: Ability to manage health-related needs will improve  Outcome: Ongoing  Goal: Ability to seek appropriate health care will improve  Description: Ability to seek appropriate health care will improve  Outcome: Ongoing     Problem: Nutritional:  Goal: Maintenance of adequate nutrition will improve  Description: Maintenance of adequate nutrition will improve  Outcome: Ongoing

## 2022-03-25 ENCOUNTER — TELEPHONE (OUTPATIENT)
Dept: SLEEP CENTER | Age: 47
End: 2022-03-25

## 2022-03-31 ENCOUNTER — HOSPITAL ENCOUNTER (OUTPATIENT)
Dept: OTHER | Age: 47
Setting detail: THERAPIES SERIES
Discharge: HOME | End: 2022-03-31
Payer: COMMERCIAL

## 2022-03-31 VITALS
HEIGHT: 71 IN | DIASTOLIC BLOOD PRESSURE: 82 MMHG | SYSTOLIC BLOOD PRESSURE: 132 MMHG | WEIGHT: 315 LBS | RESPIRATION RATE: 16 BRPM | HEART RATE: 69 BPM | OXYGEN SATURATION: 98 % | BODY MASS INDEX: 44.1 KG/M2

## 2022-03-31 LAB
ANION GAP SERPL CALCULATED.3IONS-SCNC: 11 MMOL/L (ref 7–16)
BUN BLDV-MCNC: 18 MG/DL (ref 6–20)
CALCIUM SERPL-MCNC: 9.3 MG/DL (ref 8.6–10.2)
CHLORIDE BLD-SCNC: 99 MMOL/L (ref 98–107)
CO2: 26 MMOL/L (ref 22–29)
CREAT SERPL-MCNC: 1.2 MG/DL (ref 0.7–1.2)
GFR AFRICAN AMERICAN: >60
GFR NON-AFRICAN AMERICAN: >60 ML/MIN/1.73
GLUCOSE BLD-MCNC: 123 MG/DL (ref 74–99)
POTASSIUM SERPL-SCNC: 3.9 MMOL/L (ref 3.5–5)
PRO-BNP: 343 PG/ML (ref 0–125)
SODIUM BLD-SCNC: 136 MMOL/L (ref 132–146)

## 2022-03-31 PROCEDURE — 36415 COLL VENOUS BLD VENIPUNCTURE: CPT

## 2022-03-31 PROCEDURE — 83880 ASSAY OF NATRIURETIC PEPTIDE: CPT

## 2022-03-31 PROCEDURE — 99214 OFFICE O/P EST MOD 30 MIN: CPT

## 2022-03-31 PROCEDURE — 80048 BASIC METABOLIC PNL TOTAL CA: CPT

## 2022-03-31 RX ORDER — AMLODIPINE BESYLATE 10 MG/1
10 TABLET ORAL DAILY
COMMUNITY
End: 2022-04-15 | Stop reason: ALTCHOICE

## 2022-03-31 ASSESSMENT — EJECTION FRACTION
EF_VALUE: 38
EF_SOURCE: 2D ECHO

## 2022-04-15 ENCOUNTER — OFFICE VISIT (OUTPATIENT)
Dept: FAMILY MEDICINE CLINIC | Age: 47
End: 2022-04-15
Payer: COMMERCIAL

## 2022-04-15 VITALS
TEMPERATURE: 97.8 F | HEART RATE: 77 BPM | BODY MASS INDEX: 44.1 KG/M2 | HEIGHT: 71 IN | SYSTOLIC BLOOD PRESSURE: 160 MMHG | DIASTOLIC BLOOD PRESSURE: 98 MMHG | WEIGHT: 315 LBS | RESPIRATION RATE: 16 BRPM | OXYGEN SATURATION: 97 %

## 2022-04-15 DIAGNOSIS — I42.8 NONISCHEMIC CARDIOMYOPATHY (HCC): Primary | ICD-10-CM

## 2022-04-15 DIAGNOSIS — I50.41 ACUTE COMBINED SYSTOLIC AND DIASTOLIC CONGESTIVE HEART FAILURE (HCC): ICD-10-CM

## 2022-04-15 PROCEDURE — 99214 OFFICE O/P EST MOD 30 MIN: CPT | Performed by: SURGERY

## 2022-04-15 RX ORDER — FUROSEMIDE 40 MG/1
40 TABLET ORAL DAILY PRN
Qty: 30 TABLET | Refills: 0 | Status: SHIPPED
Start: 2022-04-15 | End: 2022-06-01

## 2022-04-15 RX ORDER — HYDRALAZINE HYDROCHLORIDE 50 MG/1
50 TABLET, FILM COATED ORAL 3 TIMES DAILY
Qty: 90 TABLET | Refills: 5 | Status: SHIPPED
Start: 2022-04-15 | End: 2022-07-21

## 2022-04-15 NOTE — PROGRESS NOTES
Wayne Samuel (:  1975) is a 55 y.o. male,Established patient, here for evaluation of the following chief complaint(s):  1 Month Follow-Up, Health Maintenance (Due for Hep C, HIV Screen, TDap, Colonoscopy), and Hypertension         ASSESSMENT/PLAN:  1. Nonischemic cardiomyopathy (HCC)  -     hydrALAZINE (APRESOLINE) 50 MG tablet; Take 1 tablet by mouth 3 times daily, Disp-90 tablet, R-5Normal  2. Acute combined systolic and diastolic congestive heart failure (HCC)  -     hydrALAZINE (APRESOLINE) 50 MG tablet; Take 1 tablet by mouth 3 times daily, Disp-90 tablet, R-5Normal  -     furosemide (LASIX) 40 MG tablet; Take 1 tablet by mouth daily as needed (edema) If 3# weight gain in 24 hours or 5# weight gain in 5 days. , Disp-30 tablet, R-0Normal    Reinforced diet, fluid, sodium restriction, lifestyle optimization. DO NOT TAKE CCB unless directed by cardiology (no amplodipine). Increase hydralazine 50mg TID. Recheck BP in 2 weeks. Keep log at home and call sooner if SBP not under 140 and DBP not under 90. Alarm s/s to report to ED. Return in about 3 months (around 7/15/2022) for heart failure . Subjective   SUBJECTIVE/OBJECTIVE:  HPI:      1. Sleep-disordered breathing  -     Home Sleep Study; Future  -     HI DISCHARGE MEDS RECONCILED W/ CURRENT OUTPATIENT MED LIST  - Sleep study set for May  2. Hypersomnolence  -     Home Sleep Study; Future  -     HI DISCHARGE MEDS RECONCILED W/ CURRENT OUTPATIENT MED LIST  - No change  3. Nonischemic cardiomyopathy ([facility] [address]  -     HI DISCHARGE MEDS RECONCILED W/ CURRENT OUTPATIENT MED LIST  - May 9 is next apt with cardiology  - Saw NP in practice and was told no changes, doing well   - Has question about amlodipine. Counseled we typically do not give in HFrEF. 4. Acute combined systolic and diastolic congestive heart failure (HCC)  -     HI DISCHARGE MEDS RECONCILED W/ CURRENT OUTPATIENT MED LIST  - Weighing self daily.   Had great weight loss, has not had a day with 3# gain or more. And no 5 day period where he gained 5# or more. 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   - Patient was NOT taking amlodipine, this medicaiton was inadvertantly reported as a home medicaiton, cleared up with patient that he is not taking this. Made sure it was discontinued today. - Taking statin as directed  5. Secondary hypertension  -     MT DISCHARGE MEDS RECONCILED W/ CURRENT OUTPATIENT MED LIST  6. Class 3 severe obesity due to excess calories with serious comorbidity and body mass index (BMI) of 50.0 to 59.9 in adult [facility])        - Following [employer]        Preventative:  Health Maintenance   Topic Date Due    Hepatitis C screen  Never done    HIV screen  Never done    DTaP/Tdap/Td vaccine (1 - Tdap) Never done    Colorectal Cancer Screen  Never done    Flu vaccine (Season Ended) 09/01/2022    Lipid screen  03/02/2023    A1C test (Diabetic or Prediabetic)  03/03/2023    Depression Screen  03/10/2023    Pneumococcal 0-64 years Vaccine (2 - PCV) 03/10/2023    Potassium monitoring  03/31/2023    Creatinine monitoring  03/31/2023    COVID-19 Vaccine  Completed    Hepatitis A vaccine  Aged Out    Hepatitis B vaccine  Aged Out    Hib vaccine  Aged Out    Meningococcal (ACWY) vaccine  Aged Out           ROS:    Denies 10pt ROS other than noted in HPI. Objective       PHYSICAL EXAM:    BP (!) 160/98   Pulse 77   Temp 97.8 °F (36.6 °C) (Temporal)   Resp 16   Ht 5' 11\" (1.803 m)   Wt (!) 350 lb (158.8 kg)   SpO2 97%   BMI 48.82 kg/m²     AVSS    GA: Well-groomed, appears well, no acute distress. HEENT: Atraumatic normocephalic. Extraocular muscles are grossly intact.   Pupils are equal round reactive to light. Conjunctiva pink and moist.  Hearing is grossly intact. Nares patent without external drainage. Buccal mucosa pink and moist.  Posterior oropharynx clear without lesion or exudate. NECK: Trachea is midline, supple, nontender, no lymphadenopathy. CARDIO: Regular rate and rhythm without murmur rub or gallop. Cap refill 2+. Radial pulses 2+ bilaterally. No JVD. No hepatojugular reflux. No peripheral edema. RESPIRATORY: Clear to auscultation bilaterally without wheezes rales or rhonchi. Normal inspiratory and expiratory effort. Normoxic on room air    ABD: obese, normoactive bowel sounds. Soft, nontender, no organomegaly. MSK: Structurally appropriate for age. No gross deficit. NEURO: Alert, no gross deficit. PSYCH:  Mood is normal and congruent with affect. No signs of psychomotor retardation or agitation. Thought content seems normal, speech is fluent and non-pressured. SKIN: Generally warm pink and dry. Venous stasis bl le. An electronic signature was used to authenticate this note.     --Armand Pickens, DO

## 2022-04-15 NOTE — PATIENT INSTRUCTIONS
meals   Cut back on the amount of salt you use in cooking. This will help you adjust to the taste. Do not add salt after cooking. One teaspoon of salt has about 2,300 mg of sodium.  Take the salt shaker off the table.  Flavor your food with garlic, lemon juice, onion, vinegar, herbs, and spices. Do not use soy sauce, lite soy sauce, steak sauce, onion salt, garlic salt, celery salt, or ketchup on your food.  Use low-sodium salad dressings, sauces, and ketchup. Or make your own salad dressings and sauces without adding salt.  Use less salt (or none) when recipes call for it. You can often use half the salt a recipe calls for without losing flavor. Other foods such as rice, pasta, and grains do not need added salt.  Rinse canned vegetables, and cook them in fresh water. This removes some--but not all--of the salt.  Avoid water that is naturally high in sodium or that has been treated with water softeners, which add sodium. If you buy bottled water, read the label and choose a sodium-free brand. Avoid high-sodium foods   Avoid eating:  ? Smoked, cured, salted, and canned meat, fish, and poultry. ? Ham, mccormack, hot dogs, and luncheon meats. ? Regular, hard, and processed cheese and regular peanut butter. ? Crackers with salted tops, and other salted snack foods such as pretzels, chips, and salted popcorn. ? Frozen prepared meals, unless labeled low-sodium. ? Canned and dried soups, broths, and bouillon, unless labeled sodium-free or low-sodium. ? Canned vegetables, unless labeled sodium-free or low-sodium. ? Western Jil fries, pizza, tacos, and other fast foods. ? Pickles, olives, ketchup, and other condiments, especially soy sauce, unless labeled sodium-free or low-sodium. Where can you learn more? Go to https://vicki.healthCertess. org and sign in to your DestinationRX account.  Enter G882 in the [facility] box to learn more about \"Low Sodium Diet (2,000 Milligram): Care Instructions. \"     If you do not have an account, please click on the \"Sign Up Now\" link. Current as of: September 8, 2021               Content Version: 13.2  © 2006-2022 Healthwise, GateMe. Care instructions adapted under license by [facility] ([facility]). If you have questions about a medical condition or this instruction, always ask your healthcare professional. [address] any warranty or liability for your use of this information. Patient Education        Heart Failure: Care Instructions  Your Care Instructions     Heart failure occurs when your heart does not pump as much blood as the body needs. Failure does not mean that the heart has stopped pumping but rather that it is not pumping as well as it should. Over time, this causes fluid buildup in your lungs and other parts of your body. Fluid buildup can cause shortness of breath, fatigue, swollen ankles, and other problems. By taking medicines regularly, reducing sodium (salt) in your diet, checking your weight every day,and making lifestyle changes, you can feel better and live longer. Follow-up care is a key part of your treatment and safety. Be sure to make and go to all appointments, and call your doctor if you are having problems. It's also a good idea to know your test results and keep alist of the medicines you take. How can you care for yourself at home? Medicines     Be safe with medicines. Take your medicines exactly as prescribed. Call your doctor if you think you are having a problem with your medicine.      Do not take any vitamins, over-the-counter medicine, or herbal products without talking to your doctor first. [place] Ko not take ibuprofen (Advil or Motrin) and naproxen (Aleve) without talking to your doctor first. They could make your heart failure worse.      You may take some of the following medicine. ?  Angiotensin-converting enzyme inhibitors (ACEIs) or angiotensin II receptor blockers (ARBs) reduce the heart's workload, lower blood pressure, and reduce swelling. Taking an ACEI or ARB may lower your chance of needing to be hospitalized. ? Beta-blockers can slow heart rate, decrease blood pressure, and improve your condition. Taking a beta-blocker may lower your chance of needing to be hospitalized. ? Diuretics, also called water pills, reduce swelling. You will get more details on the specific medicines your doctor prescribes. Diet     Your doctor may suggest that you limit sodium. Your doctor can tell you how much sodium is right for you. An example is less than 3,000 mg a day. This includes all the salt you eat in cooking or in packaged foods. People get most of their sodium from processed foods. Fast food and restaurant meals also tend to be very high in sodium.      Ask your doctor how much liquid you can drink each day. You may have to limit liquids. Weight     Weigh yourself without clothing at the same time each day. Record your weight. Call your doctor if you have a sudden weight gain, such as more than 2 to 3 pounds in a day or 5 pounds in a week. (Your doctor may suggest a different range of weight gain.) A sudden weight gain may mean that your heart failure is getting worse. Activity level     Start light exercise (if your doctor says it is okay). Even if you can only do a small amount, exercise will help you get stronger, have more energy, and manage your weight and your stress. Walking is an easy way to get exercise. Start out by walking a little more than you did before. Bit by bit, increase the amount you walk.      When you exercise, watch for signs that your heart is working too hard. You are pushing yourself too hard if you cannot talk while you are exercising.  If you become short of breath or dizzy or have chest pain, stop, sit down, and rest.      If you feel \"wiped out\" the day after you exercise, walk slower or for a shorter distance until you can work up to a better pace.      Get enough rest at night. Sleeping with 1 or 2 pillows under your upper body and head may help you breathe easier. Lifestyle changes     Do not smoke. Smoking can make a heart condition worse. If you need help quitting, talk to your doctor about stop-smoking programs and medicines. These can increase your chances of quitting for good. Quitting smoking may be the most important step you can take to protect your heart.      Limit alcohol to 2 drinks a day for men and 1 drink a day for women. Too much alcohol can cause health problems.      Avoid getting sick from colds and the flu. Get a pneumococcal vaccine shot. If you have had one before, ask your doctor whether you need another dose. Get a flu shot each year. If you must be around people with colds or the flu, wash your hands often. When should you call for help? Call 911  if you have symptoms of sudden heart failure such as:     You have severe trouble breathing.      You cough up pink, foamy mucus.      You have a new irregular or rapid heartbeat. Call your doctor now or seek immediate medical care if:     You have new or increased shortness of breath.      You are dizzy or lightheaded, or you feel like you may faint.      You have sudden weight gain, such as more than 2 to 3 pounds in a day or 5 pounds in a week. (Your doctor may suggest a different range of weight gain.)      You have increased swelling in your legs, ankles, or feet.      You are suddenly so tired or weak that you cannot do your usual activities. Watch closely for changes in your health, and be sure to contact your doctor ifyou develop new symptoms. Where can you learn more? Go to https://DIVINE BOOKScash.Caarbon. org and sign in to your MorphoSys account. Enter Q636 in the Watertronix box to learn more about \"Heart Failure: Care Instructions. \"     If you do not have an account, please click on the \"Sign Up Now\" link.   Current as of: January 10, 9760               PSWZRIB Version: 13.2  © 9675-2932 [employer] Software [employer] Care instructions adapted under license by [facility] ([facility]). If you have questions about a medical condition or this instruction, always ask your healthcare professional. [address] any warranty or liability for your use of this information. Patient Education        Learning About Heart Failure Zones  What are heart failure zones? Heart failure zones give you an easy way to see changes in your heart failure symptoms. They also tell you when you need to get help. Check every day to seewhich zone you are in. Green zone. You are doing well. This is where you want to be.  Your weight is stable. It's not going up or down.  You breathe easily.  You are sleeping well. You are able to lie flat without shortness of breath.  You can do your usual activities. Yellow zone. Be careful. Your symptoms are changing. Call your doctor.  You have new or increased shortness of breath.  You are dizzy or lightheaded, or you feel like you may faint.  You have sudden weight gain, such as more than 2 to 3 pounds in a day or 5 pounds in a week. (Your doctor may suggest a different range of weight gain.)   You have increased swelling in your legs, ankles, or feet.  You are so tired or weak that you can't do your usual activities.  You are not sleeping well. Shortness of breath wakes you up at night. You need extra pillows. Red zone. This is an emergency. Call 911. You have symptoms of sudden heart failure. For example:   You have severe trouble breathing.  You cough up pink, foamy mucus.  You have a new irregular or fast heartbeat. You have symptoms of a heart attack. These may include:   Chest pain or pressure, or a strange feeling in the chest.   Sweating.  Shortness of breath.  Nausea or vomiting.    Pain, pressure, or a strange feeling in the back, neck, jaw, or upper belly or in one or both shoulders or arms.  Lightheadedness or sudden weakness.  A fast or irregular heartbeat. If you have symptoms of a heart attack: After you call 911, the  may tell you to chew 1 adult-strength or 2 to 4 low-doseaspirin. Wait for an ambulance. Do not try to drive yourself. Follow-up care is a key part of your treatment and safety. Be sure to make and go to all appointments, and call your doctor if you are having problems. It's also a good idea to know your test results and keep alist of the medicines you take. Where can you learn more? Go to https://FlyBridGepeQuickProNotes.PGP TrustCenter. org and sign in to your Trusight account. Enter T174 in the Aupix box to learn more about \"Learning About Heart Failure Zones. \"     If you do not have an account, please click on the \"Sign Up Now\" link. Current as of: January 10, 2022               Content Version: 13.2  © 2006-2022 Berrybenka. Care instructions adapted under license by [facility] ([facility]). If you have questions about a medical condition or this instruction, always ask your healthcare professional. [place] [address] any warranty or liability for your use of this information. Patient Education        Limiting Sodium With Heart Failure: Care Instructions  Your Care Instructions     Sodium causes your body to hold on to extra water. This may cause your heartfailure symptoms to get worse. Limiting sodium may help you feel better. People get most of their sodium from processed foods. Fast food and restaurant meals also tend to be very high in sodium. Your doctor may suggest that you limit sodium. Your doctor can tell you how much sodium is right for you. An example is less than 3,000 mg a day. This includes all the salt you eat incooked or packaged foods. Follow-up care is a key part of your treatment and safety. Be sure to make and go to all appointments, and call your doctor if you are having problems.  It's also a good idea to know your test results and keep alist of the medicines you take. How can you care for yourself at home? Read food labels   Read food labels on cans and food packages. The labels tell you how much sodium is in each serving. Make sure that you look at the serving size. If you eat more than the serving size, you have eaten more sodium than is listed for one serving.  Food labels also tell you the Percent Daily Value for sodium. Choose products with low Percent Daily Values for sodium.  Be aware that sodium can come in forms other than salt, including monosodium glutamate (MSG), sodium citrate, and sodium bicarbonate (baking soda). MSG is often added to Asian food. You can sometimes ask for food without MSG or salt. Buy low-sodium foods   Buy foods that are labeled \"unsalted\" (no salt added), \"sodium-free\" (less than 5 mg of sodium per serving), or \"low-sodium\" (140 mg or less of sodium per serving). A food labeled \"light sodium\" has less than half of the full-sodium version of that food. Foods labeled \"reduced-sodium\" may still have too much sodium.  Buy fresh vegetables or plain, frozen vegetables. Buy low-sodium versions of canned vegetables, soups, and other canned goods. Prepare low-sodium meals   Use less salt each day when cooking. Reducing salt in this way will help you adjust to the taste. Do not add salt after cooking. Take the salt shaker off the table.  Flavor your food with garlic, lemon juice, onion, vinegar, herbs, and spices instead of salt. Do not use soy sauce, steak sauce, onion salt, garlic salt, or ketchup on your food.  Make your own salad dressings, sauces, and ketchup without adding salt.  Use less salt (or none) when recipes call for it. You can often use half the salt a recipe calls for without losing flavor. Other dishes like rice, pasta, and grains do not need added salt.  Rinse canned vegetables. This removes some--but not all--of the salt.    Avoid water that has a naturally high sodium content or that has been treated with water softeners, which add sodium. If you buy bottled water, read the label and choose a sodium-free brand. Avoid high-sodium foods, such as:   Smoked, cured, salted, and canned meat, fish, and poultry.  Ham, mccormack, hot dogs, and luncheon meats.  Regular, hard, and processed cheese and regular peanut butter.  Crackers with salted tops.  Frozen prepared meals.  Canned and dried soups, broths, and bouillon, unless labeled sodium-free or low-sodium.  Canned vegetables, unless labeled sodium-free or low-sodium.  Salted snack foods such as chips and pretzels.  Western Jil fries, pizza, tacos, and other fast foods.  Pickles, olives, ketchup, and other condiments, especially soy sauce, unless labeled sodium-free or low-sodium. If you cannot cook for yourself   Have family members or friends help you, or have someone cook low-sodium meals.  Check with your local senior nutrition program to find out where meals are served and whether they offer a low-sodium option. You can often find these programs through your local health department or hospital.   Have meals delivered to your home. Most [facility] have a RocketPlay on Crush on original products. These programs provide one hot meal a day for older adults, delivered to their homes. Ask whether these meals are low-sodium. Let them know that you are on a low-sodium diet. Where can you learn more? Go to https://Health Impact Solutionscash.Verifcient Technologies. org and sign in to your Demohour account. Enter A166 in the [facility] box to learn more about \"Limiting Sodium With Heart Failure: Care Instructions. \"     If you do not have an account, please click on the \"Sign Up Now\" link. Current as of: January 10, 2022               Content Version: 13.2  © 8654-2636 Healthwise, Incorporated. Care instructions adapted under license by [facility] ([facility]).  If you have questions about a medical condition or this instruction, always ask your healthcare professional. [place] [address] any warranty or liability for your use of this information. Patient Education        Medicines to Avoid With Heart Failure: Care Instructions  Your Care Instructions     Your doctor gave you medicines to help treat your heart failure. But did you know that many other medicines can make heart failure worse? Even medicines andherbs that you buy over the counter (OTC) can harm you. Be sure your doctor knows all of the OTC and prescription drugs you take. Anddon't start to take any medicine unless your doctor says it's okay. Follow-up care is a key part of your treatment and safety. Be sure to make and go to all appointments, and call your doctor if you are having problems. It's also a good idea to know your test results and keep alist of the medicines you take. How can you care for yourself at home? Over-the-counter drugs   Before you take any over-the-counter drug, ask your doctor or pharmacist if it's safe. This includes herbs and vitamins. Medicines to avoid include:  ? Pain relievers called NSAIDs. These include ibuprofen and naproxen. Use acetaminophen instead. For example, you can take Tylenol for pain or fever. ? Low-dose aspirin. If your doctor has told you to take aspirin every day for your heart, follow the doctor's instructions on how much to take. Don't take aspirin for pain. ? Antacids or laxatives. Don't take ones that have sodium in them. These include Chrystal-Himrod. ? Cold, cough, flu, or sinus medicines. Read the label. Don't take ones that have pseudoephedrine, ephedrine, phenylephrine, or oxymetazoline in them. And make sure they don't have aspirin or ibuprofen in them. Watch for all of these in allergy medicines, nose sprays, and herbal products too.  ? Supplements and vitamins.  These include black cohosh, [place]'s wort, and vitamin E.  Prescription drugs   Each time you see a doctor, make sure that your doctor knows that you take drugs for heart failure. Before you fill any new prescription, ask the pharmacist if it's okay to take the new drug. Drugs that can make heart failure worse include:  ? Calcium channel blockers. These include nifedipine. If you need to take this type of drug for another health problem, your doctor will closely watch your health. ? Heart rhythm drugs. These include disopyramide and flecainide. These can treat a fast or uneven heart rhythm. ? Prescription NSAIDs. These include celecoxib (Celebrex) and diclofenac.  ? Certain medicines for diabetes. These include pioglitazone, rosiglitazone, and saxagliptin. Where can you learn more? Go to https://AdsWizzpepiceweb.FansUnite. org and sign in to your MarketBridge account. Enter T707 in the Proven box to learn more about \"Medicines to Avoid With Heart Failure: Care Instructions. \"     If you do not have an account, please click on the \"Sign Up Now\" link. Current as of: January 10, 2022               Content Version: 13.2  © 2006-2022 OptiNose. Care instructions adapted under license by [facility] ([facility]). If you have questions about a medical condition or this instruction, always ask your healthcare professional. [place] [address] any warranty or liability for your use of this information. Patient Education        Learning About Restricting Fluids When You Have Heart Failure  Why is it important to limit fluids when you have heart failure? With heart failure, having too much fluid in your body can lower sodium levels in the blood. It can also cause symptoms such as swelling. Limiting fluids, if your doctor tells you to, can help balance your body's sodium level. It mayalso help you feel better. How can you care for yourself at home?  Find a way of tracking the fluids you take in that works for you.  Here are two methods you can try:  ? Write down how much you drink throughout the day.  ? Keep a container filled with the amount of liquid allowed for the day. As you drink liquids during the day, such as a 6-ounce cup of coffee, pour that same amount out of the container. When the container is empty, you've had your liquid for the day.  Count any foods that will melt (such as ice cream, gelatin, or flavored ice treats) or liquid foods (such as soup) as part of your fluids for the day. Also count the liquid in canned fruits and vegetables as part of your daily intake, or drain them well before serving.  Space your liquids throughout the day. Then you won't be tempted to drink more than the amount your doctor recommends.  To relieve thirst without taking in extra water, try chewing gum, sucking on hard candy (sugarless if you have diabetes), or rinsing your mouth with water and spitting it out. Where can you learn more? Go to https://PBJ Concierge.Punch!. org and sign in to your Angry Citizen account. Enter F350 in the SwingPal box to learn more about \"Learning About Restricting Fluids When You Have Heart Failure. \"     If you do not have an account, please click on the \"Sign Up Now\" link. Current as of: January 10, 2022               Content Version: 13.2  © 2006-2022 Healthwise, Incorporated. Care instructions adapted under license by [facility] ([facility]). If you have questions about a medical condition or this instruction, always ask your healthcare professional. [place] [address] any warranty or liability for your use of this information. Patient Education        Learning About Self-Care for Heart Failure  What is self-care for heart failure? Heart failure usually gets worse over time. But there are many things you gloria to feel better, avoid the hospital, and live longer. Self-care means managing your health by doing certain things every day, like weighing yourself. It's about knowing which symptoms to watch for so you can avoid getting worse. When you practice good self-care, you know when it's time to call your doctor and when your heart failure has turned into an emergency. The list below can help. Top 5 self-care tips for every day   1. Take your medicines as prescribed. This gives them the best chance of helping you. 2. Weigh yourself every day. This helps you watch for signs that you're getting worse. Weight gain may be a sign that your body is holding on to too much fluid. Weigh yourself at the same time each day, using the same scale, on a hard, flat surface. The best time is in the morning after you go to the bathroom and before you eat or drink anything. 3. Keep a daily record of your symptoms. Checking your symptoms helps you see what symptoms are normal for you and if they change or get worse. 4. Limit sodium. This helps keep fluid from building up and may help you feel better. Your doctor can tell you how much sodium is right for you. An example is less than 3,000 milligrams (mg) a day. Try limiting the salt you eat at home, and by watching for \"hidden\" sodium when you eat out or shop for food. 5. Try to exercise throughout the week. Exercise makes your heart stronger and can help you avoid symptoms. Walking is a great way to get exercise. If your doctor says it's safe, start out with some short walks. Then slowly build up to longer ones. Some people with heart failure also may need to limit how much fluid they drink each day. Ask your doctor if this is true for you and, if it is, how much fluidis safe for you to drink each day. When should you act? Try to become familiar with signs that mean your heart failure is gettingworse. If you need help, talk with your doctor about making a personal plan. Here are some things to watch for as you practice your daily self-care. [address] doctor if:  Compa Reddy have sudden weight gain, such as more than 2 to 3 pounds in a day or 5 pounds in a week.  (Your doctor may suggest a different range of weight gain.)   You have new or worse swelling in your feet, ankles, or legs.  Your breathing gets worse. Activities that did not make you short of breath before are hard for you now.  Your breathing when you lie down is worse than usual, or you wake up at night needing to catch your breath. Be sure to make and go to all of your follow-up appointments. And it's always agood idea to call your doctor anytime you have a sudden change in symptoms. When is it an emergency? Sometimes the symptoms get worse very quickly. This is called sudden heartfailure. It causes fluid to build up in your lungs. Sudden heart failure is an emergency. If you have any of these symptoms, you need care right away. Call 911 if:    You have severe shortness of breath.  You have an irregular or fast heartbeat.  You cough up foamy, pink mucus. What else can you do to stay healthy? There are other things you can do to take care of your body and your heart. These things will help you feel better. And they will also reduce your risk ofheart attack and stroke.  Try to stay at a healthy weight. Eat a healthy diet with lots of fresh fruit, vegetables, and whole grains.  If you smoke, quit.  Limit the amount of alcohol you drink.  Keep high blood pressure and diabetes under control. If you need help, talk with your doctor. If your doctor has not set you up with a cardiac rehabilitation (rehab) program, talk to him or her about whether that is right for you. Cardiac rehabincludes exercise, help with diet and lifestyle changes, and emotional support. Also let your doctor know if:  Simon Terrazas having trouble sleeping. Sleep is important to your well-being. It also helps your heart work the way it's supposed to. Your doctor can help you decide if you need treatment for sleep problems.  You're feeling sad or hopeless much of the time, or you are worried and anxious. Heart failure can be hard on your emotions.  Treatment with counseling and medicine can help. And when you feel better, you're more likely to take care of yourself.  You think you may have a problem with alcohol or drug use. You and your doctor can decide if you have a problem and what type of treatment might help you. Follow-up care is a key part of your treatment and safety. Be sure to make and go to all appointments, and call your doctor if you are having problems. It's also a good idea to know your test results and keep alist of the medicines you take. Where can you learn more? Go to https://SignaturepeTerpenoid Therapeutics.SNOBSWAP. org and sign in to your Kranem account. Enter M589 in the ChatLingual box to learn more about \"Learning About Self-Care for Heart Failure. \"     If you do not have an account, please click on the \"Sign Up Now\" link. Current as of: January 10, 2022               Content Version: 13.2  © 2006-2022 [employer]. Care instructions adapted under license by [facility] ([facility]). If you have questions about a medical condition or this instruction, always ask your healthcare professional. [place] [address] any warranty or liability for your use of this information. Patient Education        Heart Failure and Sleep Apnea: Care Instructions  Overview     Sleep apnea is fairly common in people with heart failure. Sleep apnea means you stop breathing for 10 seconds or longer during sleep. Types of sleep apnea include central sleep apnea (CSA) and obstructive sleep apnea (JAEL). CSA is caused by a problem with how the brain signals the breathing muscles. JAEL happens when your airway gets blocked while you sleep. Some people have bothtypes. When you stop breathing, the amount of oxygen in your blood drops, so your heart has to work harder to get enough oxygen to your body's tissues. Your heart failure symptoms may get worse. Sleep apnea may also cause you to snoreloudly and not sleep well, so you wake up feeling tired.   Getting treatment for sleep apnea may help you sleep and feel better. Follow-up care is a key part of your treatment and safety. Be sure to make and go to all appointments, and call your doctor if you are having problems. It's also a good idea to know your test results and keep alist of the medicines you take. How can you care for yourself at home?  Lose weight, if needed.  Sleep on your side. It may help mild apnea.  Avoid alcohol and medicines such as sleeping pills, opioids, or sedatives before bed.  Don't smoke. If you need help quitting, talk to your doctor.  Prop up the head of your bed.  Treat breathing problems, such as a stuffy nose, that are caused by a cold or allergies.  Try a continuous positive airway pressure (CPAP) breathing machine if your doctor recommends it.  If CPAP doesn't work for you, ask your doctor if you can try other masks, settings, or breathing machines.  Try oral breathing devices or other nasal devices.  Talk to your doctor if your nose feels dry or bleeds, or if it gets runny or stuffy when you use a breathing machine.  Tell your doctor if you're sleepy during the day and it affects your daily life. Don't drive or operate machinery when you're drowsy. When should you call for help? Watch closely for changes in your health, and be sure to contact your doctor if you have any problems. Where can you learn more? Go to https://You.Docash.Viridity Software. org and sign in to your [employer] account. Enter A820 in the [facility] box to learn more about \"Heart Failure and Sleep Apnea: Care Instructions. \"     If you do not have an account, please click on the \"Sign Up Now\" link. Current as of: January 10, 2022               Content Version: 13.2  © 0942-1999 Healthwise, Incorporated. Care instructions adapted under license by [facility] ([facility]).  If you have questions about a medical condition or this instruction, always ask your healthcare professional. [employer] disclaims any warranty or liability for your use of this information.

## 2022-04-28 ENCOUNTER — HOSPITAL ENCOUNTER (OUTPATIENT)
Dept: OTHER | Age: 47
Setting detail: THERAPIES SERIES
Discharge: HOME | End: 2022-04-28
Payer: COMMERCIAL

## 2022-04-28 VITALS
RESPIRATION RATE: 16 BRPM | HEART RATE: 70 BPM | OXYGEN SATURATION: 94 % | WEIGHT: 315 LBS | HEIGHT: 71 IN | BODY MASS INDEX: 44.1 KG/M2 | DIASTOLIC BLOOD PRESSURE: 88 MMHG | SYSTOLIC BLOOD PRESSURE: 152 MMHG

## 2022-04-28 LAB
ANION GAP SERPL CALCULATED.3IONS-SCNC: 10 MMOL/L (ref 7–16)
BUN BLDV-MCNC: 12 MG/DL (ref 6–20)
CALCIUM SERPL-MCNC: 9.2 MG/DL (ref 8.6–10.2)
CHLORIDE BLD-SCNC: 102 MMOL/L (ref 98–107)
CO2: 23 MMOL/L (ref 22–29)
CREAT SERPL-MCNC: 1.2 MG/DL (ref 0.7–1.2)
GFR AFRICAN AMERICAN: >60
GFR NON-AFRICAN AMERICAN: >60 ML/MIN/1.73
GLUCOSE BLD-MCNC: 110 MG/DL (ref 74–99)
POTASSIUM SERPL-SCNC: 4.1 MMOL/L (ref 3.5–5)
PRO-BNP: 345 PG/ML (ref 0–125)
SODIUM BLD-SCNC: 135 MMOL/L (ref 132–146)

## 2022-04-28 PROCEDURE — 83880 ASSAY OF NATRIURETIC PEPTIDE: CPT

## 2022-04-28 PROCEDURE — 80048 BASIC METABOLIC PNL TOTAL CA: CPT

## 2022-04-28 PROCEDURE — 99214 OFFICE O/P EST MOD 30 MIN: CPT

## 2022-04-28 PROCEDURE — 36415 COLL VENOUS BLD VENIPUNCTURE: CPT

## 2022-04-28 NOTE — PLAN OF CARE
Problem:  Activity:  Goal: Capacity to carry out activities will improve  Description: Capacity to carry out activities will improve  Outcome: Progressing  Goal: Will verbalize the importance of balancing activity with adequate rest periods  Description: Will verbalize the importance of balancing activity with adequate rest periods  Outcome: Progressing     Problem: Cardiac:  Goal: Hemodynamic stability will improve  Description: Hemodynamic stability will improve  Outcome: Progressing  Goal: Ability to maintain an adequate cardiac output will improve  Description: Ability to maintain an adequate cardiac output will improve  Outcome: Progressing     Problem: Fluid Volume:  Goal: Risk for excess fluid volume will decrease  Description: Risk for excess fluid volume will decrease  Outcome: Progressing  Goal: Maintenance of adequate hydration will improve  Description: Maintenance of adequate hydration will improve  Outcome: Progressing  Goal: Will show no signs and symptoms of electrolyte imbalance  Description: Will show no signs and symptoms of electrolyte imbalance  Outcome: Progressing     Problem: Health Behavior:  Goal: Ability to manage health-related needs will improve  Description: Ability to manage health-related needs will improve  Outcome: Progressing  Goal: Ability to seek appropriate health care will improve  Description: Ability to seek appropriate health care will improve  Outcome: Progressing     Problem: Nutritional:  Goal: Maintenance of adequate nutrition will improve  Description: Maintenance of adequate nutrition will improve  Outcome: Progressing

## 2022-04-28 NOTE — DISCHARGE INSTR - LAB
1. BNP/BMP were drawn at your visit today. 2. Continue weighting yourself daily and reviewing heart failure zones. 3. Keep your appointment with  on 5/9                            HEART FAILURE  / CONGESTIVE HEART FAILURE  DISCHARGE INSTRUCTIONS:  GUIDELINES TO FOLLOW AT [address] [facility]:     MEDICATIONS:  Take your medication as directed. If you are experiencing any side effects, inform your doctor, Do not stop taking any of your medications without letting your doctor know. Check with your doctor before taking any over-the-counter medications / herbal / or dietary supplements. They may interfere with your other medications. Do not take ibuprofen (Advil or Motrin) and naproxen (Aleve) without talking to your doctor first. They could make your heart failure worse. WEIGHT MONITORING:   Weigh yourself everyday (with the same scale) around the same time of the day and write it down. (you can chart them on a calendar or keep track of them on paper. Notify your doctor of a weight gain of 3 pounds or more in 1 day   OR a total of 5 pounds or more in 1 week    Take your weight record to your doctor visits  Also, the same goes if you loose more than 3# in one day, let your heart doctor know. DIET:   Cardiac heart healthy diet- Low saturated / low trans fat, no added salt, caffeine restricted, Low sodium diet-   No more than 2,000mg (2 grams) of salt / sodium per day (which equals to a little less than  a teaspoon of salt)  If your doctor wants you on a fluid restriction. ..it is usually recommended a fluid limit of 2,000cc -  Fluid restriction- 2,000 ml (milliliters) = 64 ounces = you can have 8 glasses of fluid per day (each glass 8 ounces)    Follow a low salt diet - avoid using salt at the table, avoid / limit use of canned soups, processed / packaged foods, salted snacks, olives and pickles.   Do not use a salt substitute without checking with your doctor, they may contain a high amount of potassioum. (Mrs. Juana Ch is safe to use). Limit the use of alcohol       CALL YOUR DOCTOR THE FIRST DAY YOU NOTICE ANY OF THESE   SYMPTOMS:  You have a weight gain of 3 pounds or more in 1 day         OR 5 pounds or more in one week  More shortness of breath  More swelling of your stomach, legs, ankles or feet  Feeling more tired, No energy  Dry hacky cough  Dizziness  More chest pain / discomfort       (CALL 911 IF ANY OF THE FOLLOWING OCCURS  Chest pain (not relieved with nitroglycerine, if you have been prescribed this medication)  Severe shortness of breath  Faint / Pass out  Confusion / cannot think clearly  If symptoms get worse           SMOKING - TOBACCO USE:  * IF YOU SMOKE - STOP! Kick the habit. [address] Program is offered at [place] [address] and [address] [facility]. Call [phone] extension [phone] for more information. ACTIVITY:   (Ask your doctor when you will be able to return to work and before starting any exercise program.  Do not drive unless unless your doctor has given you permission to do so). Start light exercise. Even if you can only do a small amount, exercise will help you get stronger, have more energy, help manage your weight and decrease  stress. Walking is an easy way to get exercise. Start out slowly and  increase the amount you walk as tolerated  If you become short of breath, dizzy or have chest pain; stop, sit down, and rest.  If you feel \"wiped out\" the day after you exercise, walk at a slower pace or for a shorter distance. You can gradually increase the pace or amount of time. (Do not exercise right after a meal or in extreme temperatures, such as above 85 degrees, if the air is really humid, or wind chill is less than 20 degrees)                                             ADDITIONAL INFORMATION:  Avoid getting sick from colds and the flu.  Stay away from friends or family that you know may have a contagious illness  Get plenty of rest   Get a flu shot each year. Get a pneumococcal vaccine shot. If you have had one before, ask your doctor whether you need another dose. My Goal for Self-management of Heart Failure Includes 5 steps :    1. Notice a change in symptoms ( weight gain, short of breath, leg swelling, decreased activity level, bloating. ...)    2. Evaluate the change: (use the Heart Failure Zones )     3. Decide to take action: decide what your options are, such as: (call your doctor for an extra visit, take a prescribed medication, such as your water pill if your doctor has given you directions to do so, [address])    4. Come up with a strategy:  (now you call the doctor for advice / appointment. This is where you take action!!! Do not wait, catch the symptom early and treat it before it worsens. 5. Evaluate the response: The next day, check your Heart Failure Zones: are you in the GREEN ZONE (safe zone)? Worsening symptoms of YELLOW ZONE? Or have you moved to the RED ZONE and need to call 911 or go to the Emergency Room for evaluation? Call your doctor's office to update them on your symptoms of heart failure. Learning About Heart Failure Zones  What are heart failure zones? Heart failure zones give you an easy way to see changes in your heart failure symptoms. They also tell you when you need to get help. Check every day to see which zone you are in. Green zone. You are doing well. This is where you want to be. Your weight is stable. It's not going up or down. You breathe easily. You are sleeping well. You are able to lie flat without shortness of breath. You can do your usual activities. Yellow zone. Be careful. Your symptoms are changing. Call your doctor. You have new or increased shortness of breath. You are dizzy or lightheaded, or you feel like you may faint.   You have sudden weight gain, such as more than 2 to 3 pounds in a day or 5 pounds in a week. (Your doctor may suggest a different range of weight gain.)  You have increased swelling in your legs, ankles, or feet. You are so tired or weak that you can't do your usual activities. You are not sleeping well. Shortness of breath wakes you up at night. You need extra pillows. Red zone. 911  This is an emergency. Call . You have symptoms of sudden heart failure. For example: You have severe trouble breathing. You cough up pink, foamy mucus. You have a new irregular or fast heartbeat. You have symptoms of a heart attack. These may include:  Chest pain or pressure, or a strange feeling in the chest.  Sweating. Shortness of breath. Nausea or vomiting. Pain, pressure, or a strange feeling in the back, neck, jaw, or upper belly or in one or both shoulders or arms. Lightheadedness or sudden weakness. A fast or irregular heartbeat. If you have symptoms of a heart attack 911 : After you call , the  may tell you to chew 1 adult-strength or 2 to 4 low-dose aspirin. Wait for an ambulance. Do not try to drive yourself. Follow-up care is a key part of your treatment and safety. Be sure to make and go to all appointments, and call your doctor if you are having problems. It's also a good idea to know your test results and keep a list of the medicines you take. Where can you learn more? Go to [email or web address] MedicalpejavedewF?[email or web address] Bu F?rsat.MEDOVENT. org and sign in to your VidaPak account. Enter T174 in the [facility] box to learn more about \"Learning About Heart Failure Zones. \"     If you do not have an account, please click on the \"Sign Up Now\" link. Current as of: August 31, 2020               Content Version: 12.9  © 9374-0926 Healthwise, Incorporated. Care instructions adapted under license by [facility] ([facility]).  If you have questions about a medical condition or this instruction, always ask your healthcare professional. [address] any warranty or liability for your use of this information. Medicines for Heart Failure: Care Instructions  Your Care Instructions     Most people with heart failure are helped by taking several medicines to protect their heart. These medicines can help you feel better and live longer. It is important to take all your medicines exactly as prescribed to get thebest results. They can also cause side effects. If you think that any of your medicines are causing side effects, talk withyour doctor. Follow-up care is a key part of your treatment and safety. Be sure to make and go to all appointments. Call your doctor if you are having problems. It's also a good idea to know your test results and keep a list ofthe medicines you take. What medicines are used for heart failure? Aldosterone receptor antagonists. These are a type of diuretic. They make the kidneys get rid of extra fluid. Angiotensin-converting enzyme (ACE) inhibitors help blood flow. They relax the blood vessels and lower your blood pressure. The heart can then pump more blood through your body without working harder. Angiotensin II receptor blockers (ARBs) work like ACE inhibitors. Some people use them instead. Angiotensin receptor neprilysin inhibitor (ARNI) medicine works like ARBs and ACE inhibitors. Some people take an ARNI medicine instead. Beta-blockers can slow the heart rate and decrease blood pressure. Digoxin relieves symptoms in some people with heart failure. Diuretics reduce swelling. They do this by helping the kidneys get rid of excess fluid. They are also called water pills. Hydralazine. This may be taken with a nitrate to widen blood vessels. It can lower blood pressure and reduce the workload on the heart. Ivabradine slows the heart rate. SGLT2 inhibitors help remove extra glucose through the urine. What should you know about these medicines? This is not a complete list of medicines used for heart failure.  If you havequestions about your medicine, ask your doctor or pharmacist.  ACE inhibitors  Before you start to take an ACE inhibitor, talk to your doctor. Tell him or herif:  You take any anti-inflammatory medicines. These include ibuprofen (Advil, Motrin) and naproxen (Aleve). You take antacids, potassium pills or a salt substitute, or lithium. You take a diuretic. You are pregnant or breastfeeding or you plan to get pregnant. You have kidney disease. Side effects include:  A dry cough. If the cough is bad enough to make you stop the medicine, talk to your doctor. You may need to take a different one. Feeling lightheaded. This may happen when you stand up too fast. It usually gets better with time. Angiotensin II receptor blockers (ARBs)  Before you start to take an ARB, talk to your doctor. Tell him or her if:  You take any anti-inflammatory medicines. These include ibuprofen (Advil, Motrin) and naproxen (Aleve). You take antacids, potassium pills or a salt substitute, or lithium. You have kidney disease. You take a diuretic. You are pregnant or breastfeeding or you plan to get pregnant. Side effects include:  Feeling lightheaded or dizzy. These are the most common side effects. Angiotensin receptor neprilysin inhibitor (ARNI)  Before you start to take an ARNI, talk to your doctor. Tell him or her if:  You take any anti-inflammatory medicines. These include ibuprofen (Advil, Motrin) and naproxen (Aleve). You take antacids, potassium pills or a salt substitute, or lithium. You take an ACE inhibitor or an ARB. You have kidney or liver problems. You take a diuretic. You are pregnant or breastfeeding or you plan to get pregnant. Side effects include:  Feeling lightheaded or dizzy. These are the most common side effects. Diuretics (water pills)  Before you start to take a diuretic, talk to your doctor. Tell him or her if:  You take lithium or anti-inflammatory medicines such as Advil or Aleve. Side effects include:  Urinating often.  Ask your doctor about timing these pills so that you don't have to use the bathroom at a bad time. Muscle cramps. This may mean that you are losing too much potassium. It is an important mineral. Call your doctor if you get muscle cramps. Tender breasts. This may occur in men who take spironolactone. Call your doctor if you get tender breasts that bother you. Beta-blockers  Before you start to take a beta-blocker, talk to your doctor. Tell him or herif:  You have asthma or diabetes. Side effects include:  Feeling dizzy or tired. This usually gets better with time. Digoxin  Before you start to take digoxin, talk to your doctor. Tell him or her if:  You have kidney disease. You take a diuretic or other medicines. This includes over-the-counter medicines. Side effects include:  Nausea or vomiting. Confusion, changes in vision, a racing or slowed heartbeat, or dizziness. Call your doctor right away if you have any of these side effects. Where can you learn more? Go to https://Think Good Thoughts.New Travelcoo. org and sign in to your Reelation account. Enter F132 in the [facility] box to learn more about \"Medicines for Heart Failure: Care Instructions. \"     If you do not have an account, please click on the \"Sign Up Now\" link. Current as of: January 10, 2022               Content Version: 13.2  © 3017-8110 Healthwise, Incorporated. Care instructions adapted under license by [facility] ([facility]). If you have questions about a medical condition or this instruction, always ask your healthcare professional. [place] [address] any warranty or liability for your use of this information.

## 2022-04-28 NOTE — PLAN OF CARE
Problem: Chronic Conditions and Co-morbidities  Goal: Patient's chronic conditions and co-morbidity symptoms are monitored and maintained or improved  Outcome: Progressing     Problem:  Activity:  Goal: Capacity to carry out activities will improve  Description: Capacity to carry out activities will improve  4/28/2022 0721 by Rajeev Olviares RN  Outcome: Progressing  4/28/2022 0720 by Hayden Pineda RN  Outcome: Progressing  4/28/2022 0653 by Hayden Pineda RN  Outcome: Progressing  Goal: Will verbalize the importance of balancing activity with adequate rest periods  Description: Will verbalize the importance of balancing activity with adequate rest periods  4/28/2022 0721 by Rajeev Olivares RN  Outcome: Progressing  4/28/2022 0720 by aHyden Pineda RN  Outcome: Progressing  4/28/2022 0653 by Hayden Pineda RN  Outcome: Progressing     Problem: Cardiac:  Goal: Hemodynamic stability will improve  Description: Hemodynamic stability will improve  4/28/2022 0720 by Hayden Pineda RN  Outcome: Progressing  4/28/2022 0653 by Hayden Pineda RN  Outcome: Progressing  Goal: Ability to maintain an adequate cardiac output will improve  Description: Ability to maintain an adequate cardiac output will improve  4/28/2022 0720 by Hayden Pineda RN  Outcome: Progressing  4/28/2022 0653 by Hayden Pineda RN  Outcome: Progressing     Problem: Fluid Volume:  Goal: Risk for excess fluid volume will decrease  Description: Risk for excess fluid volume will decrease  4/28/2022 0720 by Hayden Pineda RN  Outcome: Progressing  4/28/2022 0653 by Hayden Pineda RN  Outcome: Progressing  Goal: Maintenance of adequate hydration will improve  Description: Maintenance of adequate hydration will improve  4/28/2022 0720 by Hayden Pineda RN  Outcome: Progressing  4/28/2022 0653 by Hayden Pineda RN  Outcome: Progressing  Goal: Will show no signs and symptoms of electrolyte imbalance  Description: Will show no signs and symptoms of electrolyte imbalance  4/28/2022 0720 by Shawn Jarquin RN  Outcome: Progressing  4/28/2022 0653 by Shawn Jarquin RN  Outcome: Progressing   Continue hf plan of care.    Problem: Health Behavior:  Goal: Ability to manage health-related needs will improve  Description: Ability to manage health-related needs will improve  4/28/2022 0720 by Shawn Jarquin RN  Outcome: Progressing  4/28/2022 0653 by Shawn Jarquin RN  Outcome: Progressing  Goal: Ability to seek appropriate health care will improve  Description: Ability to seek appropriate health care will improve  4/28/2022 0720 by Shawn Jarquin RN  Outcome: Progressing  4/28/2022 0653 by Shawn Jarquin RN  Outcome: Progressing     Problem: Nutritional:  Goal: Maintenance of adequate nutrition will improve  Description: Maintenance of adequate nutrition will improve  4/28/2022 0720 by Shawn Jarquin RN  Outcome: Progressing  4/28/2022 0653 by Shawn Jarquin RN  Outcome: Progressing

## 2022-04-28 NOTE — PROGRESS NOTES
Congestive Heart Failure [address]   1975    Referring Provider: Dr. Dania Espinoza  Primary Care Physician: Dr. Zaynab Mancuso  Cardiologist: Dr. Dania Espinoza    History of Present Illness:     Darian Garner is a 55 y.o. male with a history of HFrEF, most recent EF 33-36% from HANNA 2-. Patient Story:    He does not  have dyspnea with exertion, shortness of breath, or decline in overall functional capacity. He does not have orthopnea, PND, nocturnal cough or hemoptysis. He does not have abdominal distention or bloating, early satiety, anorexia/change in appetite. He does has a good urinary response to  oral diuretic. He does not have  lower extremity edema. He denies lightheadedness, dizziness. He denies palpitations, syncope or near syncope. He does not complain of chest pain, pressure, discomfort. Recently PCP 4-15-22  increased his hydralazine to 3 x daily for HTN control. Took meds 30 min prior to am visit today. Checked bp manual to confirm bp today. Still hypertensive 152/88. Says he's in a rush. No Known Allergies    Prior to Visit Medications    Medication Sig Taking? Authorizing Provider   hydrALAZINE (APRESOLINE) 50 MG tablet Take 1 tablet by mouth 3 times daily  Araceli Gutiérrez,    furosemide (LASIX) 40 MG tablet Take 1 tablet by mouth daily as needed (edema) If 3# weight gain in 24 hours or 5# weight gain in 5 days.   Araceli Gutiérrez DO   isosorbide mononitrate (IMDUR) 30 MG extended release tablet Take 1 tablet by mouth daily  Shay Gordon MD   sacubitril-valsartan (ENTRESTO) 24-26 MG per tablet Take 1 tablet by mouth 2 times daily  Shay Gordon MD   atorvastatin (LIPITOR) 40 MG tablet Take 1 tablet by mouth daily  Shay Gordon MD   aspirin 81 MG chewable tablet Take 1 tablet by mouth daily  Macarena Figueroa MD   carvedilol (COREG) 25 MG tablet Take 1 tablet by mouth 2 times daily  Justin Brito MD Guideline directed medical:  ARNI/ACE I/ARB: Yes (entresto)  Beta blocker:  Yes (coreg)  Aldosterone antagonist:  No    Physical Examination:     BP (!) 152/88 Comment: manual bp. took meds 630am(30min PTA)  Pulse 70   Resp 16   Ht 5' 11\" (1.803 m) Comment: stated height  Wt (!) 348 lb 8 oz (158.1 kg)   SpO2 94%   BMI 48.61 kg/m²     Assessment  Charting Type: Shift assessment (CHF clinic . ambulatory, self drove)    Neurological  Level of Consciousness: Alert (0)    HEENT (Head, Ears, Eyes, Nose, & Throat)  HEENT (WDL): Within Defined Limits    Respiratory  Respiratory Pattern: Regular (sleeps on 1 pillow, no PND. no TRAMMELL no dyspnea)  Respiratory Depth: Normal  Chest Assessment: Chest expansion symmetrical  L Breath Sounds: Clear  R Breath Sounds: Clear    Cardiac  Cardiac Regularity: Regular  Heart Sounds: S1, S2         Peripheral Vascular  Peripheral Vascular (WDL): Exceptions to WDL  Edema: Left lower extremity  RLE Edema: None  LLE Edema: Trace    Genitourinary  Genitourinary (WDL): Within Defined Limits (nocturia x1. during day 5-6 x voiding.  NO PRN lasix use.)    Psychosocial  Psychosocial (WDL): Within Defined Limits    Urine Assessment  Urine Color: Yellow/straw  Urine Appearance: Clear  Urine Odor: No odor    LAB DATA:    Last 3 BMP      Sodium (mmol/L)   Date Value   04/28/2022 135   03/31/2022 136   03/02/2022 140     Potassium (mmol/L)   Date Value   04/28/2022 4.1   03/31/2022 3.9   03/02/2022 3.8     Potassium reflex Magnesium (mmol/L)   Date Value   03/01/2022 4.2     Chloride (mmol/L)   Date Value   04/28/2022 102   03/31/2022 99   03/02/2022 98     CO2 (mmol/L)   Date Value   04/28/2022 23   03/31/2022 26   03/02/2022 28     BUN (mg/dL)   Date Value   04/28/2022 12   03/31/2022 18   03/02/2022 13     Glucose (mg/dL)   Date Value   04/28/2022 110 (H)   03/31/2022 123 (H)   03/02/2022 108 (H)     Calcium (mg/dL)   Date Value   04/28/2022 9.2   03/31/2022 9.3   03/02/2022 9.1     Last 3 BNP       Pro-BNP (pg/mL)   Date Value   04/28/2022 345 (H)   03/31/2022 343 (H)   02/28/2022 1,313 (H)      CBC: No results for input(s): WBC, HGB, PLT in the last 72 hours. BMP:    Recent Labs     04/28/22  0711      K 4.1      CO2 23   BUN 12   CREATININE 1.2   GLUCOSE 110*     Hepatic: No results for input(s): AST, ALT, ALB, BILITOT, ALKPHOS in the last 72 hours. Troponin: No results for input(s): TROPONINI in the last 72 hours. BNP: No results for input(s): BNP in the last 72 hours. Lipids: No results for input(s): CHOL, HDL in the last 72 hours. Invalid input(s): LDLCALCU  INR: No results for input(s): INR in the last 72 hours. WEIGHTS:    Wt Readings from Last 3 Encounters:   04/28/22 (!) 348 lb 8 oz (158.1 kg)   04/15/22 (!) 350 lb (158.8 kg)   03/31/22 (!) 356 lb 0.6 oz (161.5 kg)     TELEMETRY:  Cardiac Regularity: Regular  Cardiac Rhythm/Interpretation: SR    ASSESSMENT:  Brunilda Ahuja is evolemic with continued weight loss. No use of prn lasix . He denies any shortness of breath, orthopnea, cough, dizziness/lightheadedness. He does follow a low sodium diet and  Has  reduce his fluid intake to 64oz to 74 ounces. Patient has been weighting himself at home and has noticed decrease in weight. Understands HF zones use and will Icall for s/s symptomatic CHF. Interventions completed this visit:  IV diuretics given no   Lab work obtained no BNP/BMP for close monitoring of s/s symptomatic CHF.   Reviewed currently prescribed medications with patient, educated on importance of compliance and answered any questions regarding their medication  Educated on signs and symptoms of HF  Educated on low sodium diet    PLAN:  Scheduled to follow up in CHF clinic on every 1-3 months as needed   Seeing Dr Brenden Bolden in May, returning to [address] [place] [address] in June or earlier if needed    Future Appointments   Date Time Provider Echo Dias   5/3/2022  8:15 PM [address] 5/9/2022  8:30 AM Al Gonzalez MD Issa Card [facility]   6/15/2022  7:00 AM [facility] CHF ROOM 1 [facility]   7/15/2022  9:00 AM Virgilio Rubinstein,  [address]       Given clinic phone number and aware of signs and symptoms to call with any HF change in symptoms.

## 2022-04-28 NOTE — PLAN OF CARE
Problem:  Activity:  Goal: Capacity to carry out activities will improve  Description: Capacity to carry out activities will improve  4/28/2022 0720 by Angie Rey RN  Outcome: Progressing  4/28/2022 0653 by Angie Rey RN  Outcome: Progressing  Goal: Will verbalize the importance of balancing activity with adequate rest periods  Description: Will verbalize the importance of balancing activity with adequate rest periods  4/28/2022 0720 by Angie Rey RN  Outcome: Progressing  4/28/2022 0653 by Angie Rey RN  Outcome: Progressing     Problem: Cardiac:  Goal: Hemodynamic stability will improve  Description: Hemodynamic stability will improve  4/28/2022 0720 by Angie Rey RN  Outcome: Progressing  4/28/2022 0653 by Angie Rey RN  Outcome: Progressing  Goal: Ability to maintain an adequate cardiac output will improve  Description: Ability to maintain an adequate cardiac output will improve  4/28/2022 0720 by Angie Rey RN  Outcome: Progressing  4/28/2022 0653 by Angie Rey RN  Outcome: Progressing     Problem: Fluid Volume:  Goal: Risk for excess fluid volume will decrease  Description: Risk for excess fluid volume will decrease  4/28/2022 0720 by Angie Rey RN  Outcome: Progressing  4/28/2022 0653 by Angie Rey RN  Outcome: Progressing  Goal: Maintenance of adequate hydration will improve  Description: Maintenance of adequate hydration will improve  4/28/2022 0720 by Angie Rey RN  Outcome: Progressing  4/28/2022 0653 by Angie Rey RN  Outcome: Progressing  Goal: Will show no signs and symptoms of electrolyte imbalance  Description: Will show no signs and symptoms of electrolyte imbalance  4/28/2022 0720 by Angie Rey RN  Outcome: Progressing  4/28/2022 0653 by Angie Rey RN  Outcome: Progressing     Problem: Health Behavior:  Goal: Ability to manage health-related needs will improve  Description: Ability to manage health-related needs will improve  4/28/2022 0720 by Fozia Gray RN  Outcome: Progressing  4/28/2022 0653 by Fozia Gray RN  Outcome: Progressing

## 2022-05-03 ENCOUNTER — HOSPITAL ENCOUNTER (OUTPATIENT)
Dept: SLEEP CENTER | Age: 47
Discharge: HOME | End: 2022-05-03
Payer: COMMERCIAL

## 2022-05-03 DIAGNOSIS — G47.10 HYPERSOMNOLENCE: ICD-10-CM

## 2022-05-03 DIAGNOSIS — G47.30 SLEEP-DISORDERED BREATHING: ICD-10-CM

## 2022-05-03 PROCEDURE — 95806 SLEEP STUDY UNATT&RESP EFFT: CPT

## 2022-05-09 ENCOUNTER — OFFICE VISIT (OUTPATIENT)
Dept: CARDIOLOGY CLINIC | Age: 47
End: 2022-05-09
Payer: COMMERCIAL

## 2022-05-09 VITALS
DIASTOLIC BLOOD PRESSURE: 84 MMHG | RESPIRATION RATE: 18 BRPM | WEIGHT: 315 LBS | HEART RATE: 66 BPM | HEIGHT: 71 IN | BODY MASS INDEX: 44.1 KG/M2 | SYSTOLIC BLOOD PRESSURE: 136 MMHG

## 2022-05-09 DIAGNOSIS — Z71.3 DIETARY COUNSELING AND SURVEILLANCE: ICD-10-CM

## 2022-05-09 DIAGNOSIS — G47.33 OSA (OBSTRUCTIVE SLEEP APNEA): ICD-10-CM

## 2022-05-09 DIAGNOSIS — I51.7 LVH (LEFT VENTRICULAR HYPERTROPHY): ICD-10-CM

## 2022-05-09 DIAGNOSIS — I10 HYPERTENSION, UNSPECIFIED TYPE: ICD-10-CM

## 2022-05-09 DIAGNOSIS — N28.9 RENAL INSUFFICIENCY: ICD-10-CM

## 2022-05-09 DIAGNOSIS — I50.22 CHRONIC SYSTOLIC CONGESTIVE HEART FAILURE (HCC): Primary | ICD-10-CM

## 2022-05-09 DIAGNOSIS — I25.10 CORONARY ARTERY DISEASE INVOLVING NATIVE CORONARY ARTERY OF NATIVE HEART WITHOUT ANGINA PECTORIS: ICD-10-CM

## 2022-05-09 DIAGNOSIS — I42.8 NONISCHEMIC CARDIOMYOPATHY (HCC): ICD-10-CM

## 2022-05-09 DIAGNOSIS — E66.01 CLASS 3 SEVERE OBESITY DUE TO EXCESS CALORIES WITH SERIOUS COMORBIDITY AND BODY MASS INDEX (BMI) OF 50.0 TO 59.9 IN ADULT (HCC): ICD-10-CM

## 2022-05-09 DIAGNOSIS — Z98.890 S/P CARDIAC CATH: ICD-10-CM

## 2022-05-09 LAB — STATUS: NORMAL

## 2022-05-09 PROCEDURE — 95806 SLEEP STUDY UNATT&RESP EFFT: CPT | Performed by: STUDENT IN AN ORGANIZED HEALTH CARE EDUCATION/TRAINING PROGRAM

## 2022-05-09 PROCEDURE — 99214 OFFICE O/P EST MOD 30 MIN: CPT | Performed by: INTERNAL MEDICINE

## 2022-05-09 PROCEDURE — 93000 ELECTROCARDIOGRAM COMPLETE: CPT | Performed by: INTERNAL MEDICINE

## 2022-05-09 PROCEDURE — G8417 CALC BMI ABV UP PARAM F/U: HCPCS | Performed by: INTERNAL MEDICINE

## 2022-05-09 PROCEDURE — G8427 DOCREV CUR MEDS BY ELIG CLIN: HCPCS | Performed by: INTERNAL MEDICINE

## 2022-05-09 PROCEDURE — 1036F TOBACCO NON-USER: CPT | Performed by: INTERNAL MEDICINE

## 2022-05-09 NOTE — PROGRESS NOTES
Out Patient CARDIOLOGY FOLLOW UP    Name: Oskar Muniz    Age: 55 y.o. Date of Service: 5/9/2022      Referring Physician: No admitting provider for patient encounter. Chief Complaint   Patient presents with    Follow-up     1 month ov        History of Present Illness: 41-year-old male with history of obesity, renal insufficiency, hypertension, and suspected sleep apnea who was hospitalized at [facility] in March 2022 for hypertensive urgency, dyspnea on exertion and fatigue with activities and underwent an ischemic evaluation with echocardiogram revealing depressed systolic function with echo on March 2022 revealing EF of 33 to 36%. He also underwent Lexiscan myocardial perfusion stress test that revealed fixed defects but no ischemia with an EF of 34%. Invasive cholangiogram was performed which revealed RCA 50% disease with ectatic proximal RCA and medical management was recommended. Plan for follow-up visit today. Overall report doing well and states his energy level is much better and denies chest pain, dyspnea on exertion, fatigue with activities or palpitations. I increase his [place] and he will follow-up in 6 weeks for further medication adjustments and he will obtain BMP prior to next visit to guide therapy.       Review of Systems:   Cardiac: As per HPI  General: Denies fever or chills  Pulmonary: As per HPI  HEENT: Denies runny nose  GI: No complaints  : No complaints  Endocrine: Denies night sweats  Musculoskeletal: No complaints  Skin: Dry skin  Neuro: No complaints  Psych: Denies depression    Past Medical History:  Past Medical History:   Diagnosis Date    Acute combined systolic and diastolic congestive heart failure ([address] 03/01/2022    H/O cardiovascular stress test 03/02/2022    Nuclear Lexiscan Stress Test    Hypertension        Past Surgical History:  Past Surgical History:   Procedure Laterality Date    APPENDECTOMY      FOOT SURGERY      FRACTURE SURGERY Family History:  Family History   Problem Relation Age of Onset   [facility] Stroke Mother     Hypertension Mother     Cancer Father        Social History:  Social History     Socioeconomic History    Marital status:      Spouse name: Not on file    Number of children: Not on file    Years of education: Not on file    Highest education level: Not on file   Occupational History    Not on file   Tobacco Use    Smoking status: Never Smoker    Smokeless tobacco: Never Used   Substance and Sexual Activity    Alcohol use: Never     Comment: socially    Drug use: Never    Sexual activity: Yes     Partners: Female   Other Topics Concern    Not on file   Social History Narrative    Not on file     Social Determinants of Health     Financial Resource Strain: Low Risk     Difficulty of Paying Living Expenses: Not hard at all   Food Insecurity: No Food Insecurity    Worried About [address] in the Last Year: Never true    [address] [employer] in the Last Year: Never true   Transportation Needs: No Transportation Needs    Lack of Transportation (Medical): No    Lack of Transportation (Non-Medical):  No   Physical Activity:     Days of Exercise per Week: Not on file    Minutes of Exercise per Session: Not on file   Stress:     Feeling of Stress : Not on file   Social Connections:     Frequency of Communication with Friends and Family: Not on file    Frequency of Social Gatherings with Friends and Family: Not on file    Attends Yazidi Services: Not on file    Active Member of Clubs or Organizations: Not on file    Attends Club or Organization Meetings: Not on file    Marital Status: Not on file   Intimate Partner Violence:     Fear of Current or Ex-Partner: Not on file    Emotionally Abused: Not on file    Physically Abused: Not on file    Sexually Abused: Not on file   Housing Stability: Unknown    Unable to Pay for Housing in the Last Year: No    Number of [place] in the Last Year: Not on file    Unstable Housing in the Last Year: No       Allergies:  No Known Allergies    Home Medications:  Prior to Admission medications    Medication Sig Start Date End Date Taking? Authorizing Provider   sacubitril-valsartan (ENTRESTO) 49-51 MG per tablet Take 1 tablet by mouth 2 times daily 5/9/22  Yes Vanessa Gordon MD   hydrALAZINE (APRESOLINE) 50 MG tablet Take 1 tablet by mouth 3 times daily 4/15/22  Yes Sujatha Soto DO   furosemide (LASIX) 40 MG tablet Take 1 tablet by mouth daily as needed (edema) If 3# weight gain in 24 hours or 5# weight gain in 5 days. 4/15/22  Yes Sujatha Soto DO   isosorbide mononitrate (IMDUR) 30 MG extended release tablet Take 1 tablet by mouth daily 3/4/22  Yes Vanessa Gordon MD   atorvastatin (LIPITOR) 40 MG tablet Take 1 tablet by mouth daily 3/4/22  Yes Vanessa Gordon MD   aspirin 81 MG chewable tablet Take 1 tablet by mouth daily 3/4/22  Yes Merlinda Guess, MD   carvedilol (COREG) 25 MG tablet Take 1 tablet by mouth 2 times daily 3/3/22  Yes Preethi Walker MD       Current Medications:  Current Outpatient Medications   Medication Sig Dispense Refill    sacubitril-valsartan (ENTRESTO) 49-51 MG per tablet Take 1 tablet by mouth 2 times daily 60 tablet 3    hydrALAZINE (APRESOLINE) 50 MG tablet Take 1 tablet by mouth 3 times daily 90 tablet 5    furosemide (LASIX) 40 MG tablet Take 1 tablet by mouth daily as needed (edema) If 3# weight gain in 24 hours or 5# weight gain in 5 days. 30 tablet 0    isosorbide mononitrate (IMDUR) 30 MG extended release tablet Take 1 tablet by mouth daily 30 tablet 3    atorvastatin (LIPITOR) 40 MG tablet Take 1 tablet by mouth daily 30 tablet 5    aspirin 81 MG chewable tablet Take 1 tablet by mouth daily 30 tablet 3    carvedilol (COREG) 25 MG tablet Take 1 tablet by mouth 2 times daily 180 tablet 2     No current facility-administered medications for this visit.          Physical Exam:  /84   Pulse 66 Resp 18   Ht 5' 11\" (1.803 m)   Wt (!) 347 lb 11.2 oz (157.7 kg)   BMI 48.49 kg/m²   Wt Readings from Last 3 Encounters:   05/09/22 (!) 347 lb 11.2 oz (157.7 kg)   04/28/22 (!) 348 lb 8 oz (158.1 kg)   04/15/22 (!) 350 lb (158.8 kg)       Appearance: Alert and oriented x3 not in acute distress. Skin: Dry skin  Head: Atraumatic  Eyes: Intact extraocular muscles   ENMT: Mucous membranes are moist  Neck: Supple  Lungs: Clear to auscultation  Cardiac: Normal S1 and S2  Abdomen: Protuberant  Extremities: Intact range of motion  Neurologic: No focal neurological deficits  Peripheral Pulses: 2+ peripheral pulses    Intake/Output:  No intake or output data in the 24 hours ending 05/09/22 1352  [unfilled]    Laboratory Tests:  No results for input(s): NA, K, CL, CO2, BUN, CREATININE, GLUCOSE, CALCIUM in the last 72 hours. Lab Results   Component Value Date    MG 2.2 03/03/2022    MG 2.0 03/02/2022    MG 2.0 02/28/2022     No results for input(s): ALKPHOS, ALT, AST, PROT, BILITOT, BILIDIR, LABALBU in the last 72 hours. No results for input(s): WBC, RBC, HGB, HCT, MCV, MCH, MCHC, RDW, PLT, MPV in the last 72 hours. No results found for: CKTOTAL, CKMB, CKMBINDEX, TROPONINI  No results for input(s): CKTOTAL, CKMB, CKMBINDEX, TROPHS in the last 72 hours. Lab Results   Component Value Date    INR 1.2 02/28/2022    PROTIME 13.6 (H) 02/28/2022     Lab Results   Component Value Date    TSH 3.350 03/02/2022     Lab Results   Component Value Date    LABA1C 5.9 (H) 03/03/2022     No results found for: EAG  Lab Results   Component Value Date    CHOL 175 03/02/2022     Lab Results   Component Value Date    TRIG 101 03/02/2022     Lab Results   Component Value Date    HDL 37 03/02/2022     Lab Results   Component Value Date    LDLCALC 118 (H) 03/02/2022     Lab Results   Component Value Date    LABVLDL 20 03/02/2022     No results found for: CHOLHDLRATIO  No results for input(s): PROBNP in the last 72 hours.     Cardiac Tests:  EKG reviewed (EKG date: Rhythm 66 bpm, nonspecific interventricular conduction delay):        Echocardiogram reviewed: 3/2022    Summary   Moderate concentric left ventricular hypertrophy 1.6cm. Ejection fraction is visually estimated at 33 to 36%. Right ventricular not well seen but TAPSE of 2.4cm suggest normal RV   systolic function. Mildly thickened mitral valve leaflets. Mild mitral regurgitation is present. Individual aortic valve leaflets are not clearly visualized. No hemodynamically significant aortic stenosis is present. Mild tricuspid regurgitation. Stress test reviewed:  3/2022        1. The myocardial perfusion is abnormal.   2. Fixed defects involving the anterior, anterolateral, and inferior   walls suggestive of prior infarct. No reversible defects to suggest   ischemia. 3. Dilated left ventricle with moderately reduced LV systolic   function, EF 14%. 4. There is no transient ischemic dilation. 5. High risk myocardial perfusion study based on LV dysfunction. Cardiac catheterization reviewed: 3/2022       ANGIOGRAPHIC FINDINGS:  1. The left main coronary artery arises normally from the left sinus of  Valsalva. It is a large vessel without any disease. It bifurcates into  left anterior descending artery and left circumflex artery. 2.  The left anterior descending artery is a long vessel, extends down  to the apex. It is mildly diseased in the distal segment. It gives off  a couple of diagonal branches. The second tone is the mid size that has  50% mid segment disease. 3.  The left circumflex artery also is a large vessel, gives off two  large OM branches. The left circumflex artery is mildly tortuous  without any disease. 4.  The right coronary artery has anterior high takeoff. It is ectatic  in the proximal segment. It gives off an acute marginal branch and that  40% proximal segment disease. The vessel is mildly diffuse diseased.     IMPRESSION:  1. Mild CAD involving second diagonal branch. 2.  Ectatic proximal RCA with 50% involving a large acute marginal  branch.     RECOMMENDATION:  1. Continue current treatment. 2.  Optimize cardiomyopathy treatment. 3.  Blood pressure control. 4.  The patient will be observed for two hours and discharged home if he  meets discharge criteria.       CXR reviewed: The 10-year ASCVD risk score (Jian Castellanos, et al., 2013) is: 8.5%    Values used to calculate the score:      Age: 55 years      Sex: Male      Is Non- : Yes      Diabetic: No      Tobacco smoker: No      Systolic Blood Pressure: 063 mmHg      Is BP treated: Yes      HDL Cholesterol: 37 mg/dL      Total Cholesterol: 175 mg/dL    ASSESSMENT / PLAN:    1. Chronic systolic congestive heart failure (HCC)  Echocardiogram in March 2022 revealed EF of 33 to 36%  Patient is on guideline directed medical therapy with Entresto, beta-blocker, hydralazine and Imdur  I have increase Entresto to medium intensity and he will follow-up in 6 weeks with BMP to see if Clyde Pearl can be increased to the high intensity  Once we have reached high intensity Entresto we will repeat echocardiogram in 3 months to see if systolic function has recovered  He is advised to avoid alcohol and tobacco and follow low-salt and low-fat diet as well as continuing with exercise and aerobic exercise  - Basic Metabolic Panel; Future    2. Coronary artery disease involving native coronary artery of native heart without angina pectoris  Cardiac catheterization in March 2022 revealed moderate coronary artery disease that has been medically managed as mentioned above  Continue on aspirin, statin, beta-blocker and Imdur  Currently denies any anginal symptoms  3.  Nonischemic cardiomyopathy ([address]  He has moderately depressed systolic function but the degree of obstructive coronary artery disease is not enough to suggest an ischemic cardiomyopathy and thus patient has nonischemic cardiomyopathy currently  If up titration of guideline directed medical therapy does not improve systolic function then we will discuss cardiac MRI for further evaluation  - Basic Metabolic Panel; Future    4. Hypertension, unspecified type  He has long-term history of uncontrolled hypertension with subsequent LVH seen on recent echocardiogram  He was previously hospitalized for hypertensive urgency  Blood pressure is improving with guideline directed medical therapy    5. S/P cardiac cath  As mentioned above  6. Class 3 severe obesity due to excess calories with serious comorbidity and body mass index (BMI) of 50.0 to 59.9 in adult (HCC)  Weight loss was recommended  7. Dietary counseling and surveillance  Follow-up with dietitian  8. JAEL (obstructive sleep apnea)   Follow-up with your primary care doctor for sleep apnea management      9. Renal insufficiency  Monitor BUN/creatinine closely     10. LVH  Likely due to uncontrolled hypertension   We will continue to monitor blood pressure and optimize medication   If systolic function does not recover we will obtain cardiac MRI for nonischemic cardiomyopathy and further evaluate the LVH. FOLLOW-UP 6 weeks        Thank you for allowing me to participate in your patient's care. Please feel free to contact me if you have any questions or concerns.     Wayne Sharp MD  [facility]) Cardiology

## 2022-05-12 ENCOUNTER — TELEPHONE (OUTPATIENT)
Dept: FAMILY MEDICINE CLINIC | Age: 47
End: 2022-05-12

## 2022-05-12 NOTE — TELEPHONE ENCOUNTER
Called patient to schedule a follow up appointment after his sleep study. No answer, lvm with call back numbers.     \"Needs to come in and discuss the results of his sleep study because there are options he could get a split level titration which would qualify him for CPAP machine we can discuss surgical options we could discuss options with dentistry and also the obligatory weight loss which she is already on a path for losing. - \"

## 2022-05-16 DIAGNOSIS — G47.33 OSA (OBSTRUCTIVE SLEEP APNEA): Primary | ICD-10-CM

## 2022-05-17 ENCOUNTER — TELEPHONE (OUTPATIENT)
Dept: SLEEP CENTER | Age: 47
End: 2022-05-17

## 2022-05-19 ENCOUNTER — TELEPHONE (OUTPATIENT)
Dept: NON INVASIVE DIAGNOSTICS | Age: 47
End: 2022-05-19

## 2022-05-19 NOTE — TELEPHONE ENCOUNTER
Medication: Entresto 49-51mg   Sig: BID    Dx: acute combined systolic diastolic congestive heart failure I50.41  Nonischemic cardiomyopathy I42.5    Provider: Pierre Coreas    Pharmacy: [place] [id number] [address] [facility] ph: [phone]

## 2022-05-31 DIAGNOSIS — I50.41 ACUTE COMBINED SYSTOLIC AND DIASTOLIC CONGESTIVE HEART FAILURE (HCC): ICD-10-CM

## 2022-06-01 RX ORDER — FUROSEMIDE 40 MG/1
TABLET ORAL
Qty: 30 TABLET | Refills: 0 | Status: SHIPPED
Start: 2022-06-01 | End: 2022-07-21 | Stop reason: SDUPTHER

## 2022-06-01 NOTE — TELEPHONE ENCOUNTER
Last Appointment:  4/15/2022  Future Appointments   Date Time Provider Echo Dias   6/15/2022  7:00 AM [facility] CHF ROOM 1 [facility]   7/1/2022 12:30 PM Estrella Fass Severa Blue, MD Issa Card [facility]   7/15/2022  9:00 AM Keesha Marks  [address]

## 2022-06-14 ENCOUNTER — TELEPHONE (OUTPATIENT)
Dept: CARDIOLOGY CLINIC | Age: 47
End: 2022-06-14

## 2022-06-14 NOTE — TELEPHONE ENCOUNTER
Patient to have a wisdom tooth extracted by Dr. Beth Barlow on 5/15/2022. OK to proceed? Please advise.

## 2022-06-15 ENCOUNTER — HOSPITAL ENCOUNTER (OUTPATIENT)
Dept: OTHER | Age: 47
Setting detail: THERAPIES SERIES
Discharge: HOME | End: 2022-06-15
Payer: COMMERCIAL

## 2022-06-15 VITALS
BODY MASS INDEX: 44.1 KG/M2 | DIASTOLIC BLOOD PRESSURE: 90 MMHG | OXYGEN SATURATION: 96 % | WEIGHT: 315 LBS | SYSTOLIC BLOOD PRESSURE: 152 MMHG | HEIGHT: 71 IN | HEART RATE: 71 BPM | RESPIRATION RATE: 18 BRPM

## 2022-06-15 LAB
ANION GAP SERPL CALCULATED.3IONS-SCNC: 10 MMOL/L (ref 7–16)
BUN BLDV-MCNC: 15 MG/DL (ref 6–20)
CALCIUM SERPL-MCNC: 9.1 MG/DL (ref 8.6–10.2)
CHLORIDE BLD-SCNC: 101 MMOL/L (ref 98–107)
CO2: 27 MMOL/L (ref 22–29)
CREAT SERPL-MCNC: 1.1 MG/DL (ref 0.7–1.2)
GFR AFRICAN AMERICAN: >60
GFR NON-AFRICAN AMERICAN: >60 ML/MIN/1.73
GLUCOSE BLD-MCNC: 113 MG/DL (ref 74–99)
POTASSIUM SERPL-SCNC: 3.6 MMOL/L (ref 3.5–5)
PRO-BNP: 167 PG/ML (ref 0–125)
SODIUM BLD-SCNC: 138 MMOL/L (ref 132–146)

## 2022-06-15 PROCEDURE — 80048 BASIC METABOLIC PNL TOTAL CA: CPT

## 2022-06-15 PROCEDURE — 36415 COLL VENOUS BLD VENIPUNCTURE: CPT

## 2022-06-15 PROCEDURE — 99214 OFFICE O/P EST MOD 30 MIN: CPT

## 2022-06-15 PROCEDURE — 83880 ASSAY OF NATRIURETIC PEPTIDE: CPT

## 2022-06-15 ASSESSMENT — EJECTION FRACTION: EF_SOURCE: 2D ECHO

## 2022-06-15 NOTE — PROGRESS NOTES
Congestive Heart Failure [address]   1975    Referring Provider: Dr. Nina Garcia  Primary Care Physician: Dr. Ravi Vasquez  Cardiologist: Dr. Nina Garcia    History of Present Illness:     Rudi Hong is a 55 y.o. male with a history of HFrEF, most recent EF 33-36% from HANNA 2-. Patient Story:    He does not  have dyspnea with exertion, shortness of breath, or decline in overall functional capacity. He does not have orthopnea, PND, nocturnal cough or hemoptysis. He does not have abdominal distention or bloating, early satiety, anorexia/change in appetite. He does has a good urinary response to  oral diuretic. He does have  lower extremity edema. He denies lightheadedness, dizziness. He denies palpitations, syncope or near syncope. He does not complain of chest pain, pressure, discomfort. No Known Allergies    Prior to Visit Medications    Medication Sig Taking?  Authorizing Provider   furosemide (LASIX) 40 MG tablet take 1 tablet by mouth once daily if needed IF 3 LBS WEIGHT GAIN IN 24 HOURS OR 5 LBS IN 5 DAYS  Rianna Serrano, DO   sacubitril-valsartan (ENTRESTO) 49-51 MG per tablet Take 1 tablet by mouth 2 times daily  Carlos Gordon MD   hydrALAZINE (APRESOLINE) 50 MG tablet Take 1 tablet by mouth 3 times daily  Rianna Serrano, DO   isosorbide mononitrate (IMDUR) 30 MG extended release tablet Take 1 tablet by mouth daily  Carlos Gordon MD   atorvastatin (LIPITOR) 40 MG tablet Take 1 tablet by mouth daily  Carlos Gordon MD   aspirin 81 MG chewable tablet Take 1 tablet by mouth daily  Fortino Rosario MD   carvedilol (COREG) 25 MG tablet Take 1 tablet by mouth 2 times daily  Dwight Freeman MD       Guideline directed medical:  ARNI/ACE I/ARB: Yes (entresto)  Beta blocker:  Yes (coreg)  Aldosterone antagonist:  No    Physical Examination:     BP (!) 152/90 Comment: patient did not take medications yet  Pulse 71 Resp 18   Ht 5' 11\" (1.803 m)   Wt (!) 351 lb (159.2 kg)   SpO2 96%   BMI 48.95 kg/m²     Assessment  Charting Type: Shift assessment (CHF clinic . ambulatory, self drove)    Neurological  Level of Consciousness: Alert (0)    HEENT (Head, Ears, Eyes, Nose, & Throat)  HEENT (WDL): Within Defined Limits    Respiratory  Respiratory Pattern: Regular (sleeps on 1 pillow, no PND. no TRAMMELL no dyspnea)  Respiratory Depth: Normal  Chest Assessment: Chest expansion symmetrical  L Breath Sounds: Clear  R Breath Sounds: Clear    Cardiac  Cardiac Regularity: Regular  Heart Sounds: S1, S2    Peripheral Vascular  Peripheral Vascular (WDL): Exceptions to WDL  Edema: Left lower extremity  RLE Edema: Trace  LLE Edema: Trace    Genitourinary  Genitourinary (WDL): Within Defined Limits    Psychosocial  Psychosocial (WDL): Within Defined Limits    LAB DATA:    Last 3 BMP      Sodium (mmol/L)   Date Value   06/15/2022 138   04/28/2022 135   03/31/2022 136     Potassium (mmol/L)   Date Value   06/15/2022 3.6   04/28/2022 4.1   03/31/2022 3.9     Potassium reflex Magnesium (mmol/L)   Date Value   03/01/2022 4.2     Chloride (mmol/L)   Date Value   06/15/2022 101   04/28/2022 102   03/31/2022 99     CO2 (mmol/L)   Date Value   06/15/2022 27   04/28/2022 23   03/31/2022 26     BUN (mg/dL)   Date Value   06/15/2022 15   04/28/2022 12   03/31/2022 18     Glucose (mg/dL)   Date Value   06/15/2022 113 (H)   04/28/2022 110 (H)   03/31/2022 123 (H)     Calcium (mg/dL)   Date Value   06/15/2022 9.1   04/28/2022 9.2   03/31/2022 9.3     Last 3 BNP       Pro-BNP (pg/mL)   Date Value   06/15/2022 167 (H)   04/28/2022 345 (H)   03/31/2022 343 (H)      CBC: No results for input(s): WBC, HGB, PLT in the last 72 hours. BMP:    Recent Labs     06/15/22  0722      K 3.6      CO2 27   BUN 15   CREATININE 1.1   GLUCOSE 113*     Hepatic: No results for input(s): AST, ALT, ALB, BILITOT, ALKPHOS in the last 72 hours.   Troponin: No results for input(s): TROPONINI in the last 72 hours. BNP: No results for input(s): BNP in the last 72 hours. Lipids: No results for input(s): CHOL, HDL in the last 72 hours. Invalid input(s): LDLCALCU  INR: No results for input(s): INR in the last 72 hours. WEIGHTS:    Wt Readings from Last 3 Encounters:   06/15/22 (!) 351 lb (159.2 kg)   05/09/22 (!) 347 lb 11.2 oz (157.7 kg)   04/28/22 (!) 348 lb 8 oz (158.1 kg)     TELEMETRY:  Cardiac Regularity: Regular  Cardiac Rhythm/Interpretation: SR    ASSESSMENT:  Alexandra De Oliveira has increased his weight by 3lbs since the last CHF clinic appointment on 4/28/22. No use of prn lasix in the last two months. Pt seen Dr. Raúl Aceves on 5/9 and increased Entresto to mid dose. He denies any dizziness/lightheadedness. Patient has been weighting himself at home and has noticed decrease in weight. Understands HF zones use and will call for s/s symptomatic CHF. Follow up appointment 7/1 to gain lab work for Dr. Raúl Aceves office appointment ( pt has an follow up appointment with Dr. Raúl Aceves on 7/1). Interventions completed this visit:  IV diuretics given no   Lab work obtained no BNP/BMP for close monitoring of s/s symptomatic CHF. Reviewed currently prescribed medications with patient, educated on importance of compliance and answered any questions regarding their medication  Educated on signs and symptoms of HF  Educated on low sodium diet    PLAN:  Scheduled to follow up in CHF clinic on     Future Appointments   Date Time Provider Echo Dias   7/1/2022  8:00 AM [facility] CHF ROOM [address] [facility]   7/1/2022 12:30 PM Dana Goetz MD [address] [facility]   7/15/2022  9:00 AM Gemma Roman  [address]       Given clinic phone number and aware of signs and symptoms to call with any HF change in symptoms.

## 2022-06-15 NOTE — DISCHARGE INSTR - LAB
1. BNP/BMP were drawn at your visit today. 2. Continue weighting yourself daily and reviewing heart failure zones. 3. Please bring a list of your medications or pill bottles to ALL of your appointments.

## 2022-06-22 ENCOUNTER — TELEPHONE (OUTPATIENT)
Dept: FAMILY MEDICINE CLINIC | Age: 47
End: 2022-06-22

## 2022-06-22 NOTE — TELEPHONE ENCOUNTER
Go ahead and try the advancement device from the dentist - morbid obesity is the largest issue and there is a very, very low likelihood that the mouthguard/mandibular advancement device will help with his degree of obstructive sleep apnea. He may choose to visit ENT for possible surgical procedures (hardly any will do the UPP procedure any longer as it is not beneficial in most opinion) or visit with the sleep medicine specialist for further recommendations. The CPAP machine is part of therapy. He may benefit from autopap or bipap, which would be prescribed by the sleep medicine specialist.  Of course, loosing weight is above all else. there will never be a time where practicing medicine by wire (telephone) is appropriate in a heart failure patient with new complaints. virtual visits are a stretch. Complaints like this need to be evaluated in person. Either with me, walk in, or urgent care. What she is describing could be exophthalmos and a full diagnostic workup may or may not be warranted. It is not appropriate nor fair to do any of this over a telephone call.

## 2022-06-22 NOTE — TELEPHONE ENCOUNTER
Patient did the sleep study test and is not waking up gagging as much but is still snoring a lot is there an alternative to the cpap machine? Dentist had mentioned something about a retainer for his mouth. Also as he is sleeping (this is something new) his eyelids will open and his eyes will bulge out.     His wife would like for you to call her    Thank you

## 2022-06-30 RX ORDER — ISOSORBIDE MONONITRATE 30 MG/1
TABLET, EXTENDED RELEASE ORAL
Qty: 30 TABLET | Refills: 3 | Status: SHIPPED
Start: 2022-06-30 | End: 2022-09-20

## 2022-07-01 ENCOUNTER — HOSPITAL ENCOUNTER (OUTPATIENT)
Dept: OTHER | Age: 47
Setting detail: THERAPIES SERIES
Discharge: HOME | End: 2022-07-01
Payer: COMMERCIAL

## 2022-07-01 VITALS
BODY MASS INDEX: 44.1 KG/M2 | SYSTOLIC BLOOD PRESSURE: 162 MMHG | WEIGHT: 315 LBS | HEIGHT: 71 IN | OXYGEN SATURATION: 98 % | HEART RATE: 77 BPM | RESPIRATION RATE: 18 BRPM | DIASTOLIC BLOOD PRESSURE: 96 MMHG

## 2022-07-01 LAB
ANION GAP SERPL CALCULATED.3IONS-SCNC: 9 MMOL/L (ref 7–16)
BUN BLDV-MCNC: 13 MG/DL (ref 6–20)
CALCIUM SERPL-MCNC: 9.2 MG/DL (ref 8.6–10.2)
CHLORIDE BLD-SCNC: 101 MMOL/L (ref 98–107)
CO2: 27 MMOL/L (ref 22–29)
CREAT SERPL-MCNC: 1.1 MG/DL (ref 0.7–1.2)
GFR AFRICAN AMERICAN: >60
GFR NON-AFRICAN AMERICAN: >60 ML/MIN/1.73
GLUCOSE BLD-MCNC: 106 MG/DL (ref 74–99)
POTASSIUM SERPL-SCNC: 3.8 MMOL/L (ref 3.5–5)
PRO-BNP: 87 PG/ML (ref 0–125)
SODIUM BLD-SCNC: 137 MMOL/L (ref 132–146)

## 2022-07-01 PROCEDURE — 36415 COLL VENOUS BLD VENIPUNCTURE: CPT

## 2022-07-01 PROCEDURE — 80048 BASIC METABOLIC PNL TOTAL CA: CPT

## 2022-07-01 PROCEDURE — 83880 ASSAY OF NATRIURETIC PEPTIDE: CPT

## 2022-07-01 PROCEDURE — 99214 OFFICE O/P EST MOD 30 MIN: CPT

## 2022-07-01 NOTE — RESULT ENCOUNTER NOTE
Labs and CHF clinic note reviewed  Received call from CHF clinic and spoke with Steph Thomas RN  We reviewed GDMT  Patient was instructed to increase Entresto to 97/103 mg BID  Follow up labs in 1 week

## 2022-07-01 NOTE — DISCHARGE INSTR - LAB
1. BNP/BMP were drawn at your visit today. 2. Continue weighting yourself daily and reviewing heart failure zones.    3. BRING ACCURATE MEDICATION LIST (NAME, DOSE, FREQUENCY)  OR PILL BOTTLES EACH PROVIDER VISIT

## 2022-07-01 NOTE — PROGRESS NOTES
Congestive Heart Failure [address]   1975    Referring Provider: Dr. Kaye Stroud  Primary Care Physician: Dr. Akash Salinas  Cardiologist: Dr. Kaye Stroud    History of Present Illness:     Harsha Garcia is a 55 y.o. male with a history of HFrEF, most recent EF 33-36% from HANNA 2-. Patient Story:    He does not  have dyspnea with exertion, shortness of breath, or decline in overall functional capacity. He does not have orthopnea, PND, nocturnal cough or hemoptysis. He does not have abdominal distention or bloating, early satiety, anorexia/change in appetite. He does has a good urinary response to  oral diuretic. He does have  lower extremity edema. He denies lightheadedness, dizziness. He denies palpitations, syncope or near syncope. He does not complain of chest pain, pressure, discomfort. No Known Allergies    Prior to Visit Medications    Medication Sig Taking?  Authorizing Provider   isosorbide mononitrate (IMDUR) 30 MG extended release tablet take 1 tablet by mouth once daily  Al Gordon MD   furosemide (LASIX) 40 MG tablet take 1 tablet by mouth once daily if needed IF 3 LBS WEIGHT GAIN IN 24 HOURS OR 5 LBS IN 5 DAYS  Thebong Hinkle, DO   sacubitril-valsartan (ENTRESTO) 49-51 MG per tablet Take 1 tablet by mouth 2 times daily  Jose Gordon MD   hydrALAZINE (APRESOLINE) 50 MG tablet Take 1 tablet by mouth 3 times daily  Prakash Hinkle DO   atorvastatin (LIPITOR) 40 MG tablet Take 1 tablet by mouth daily  Jose Gordon MD   aspirin 81 MG chewable tablet Take 1 tablet by mouth daily  Barbra Scott MD   carvedilol (COREG) 25 MG tablet Take 1 tablet by mouth 2 times daily  Lisa Tidwell MD       Guideline directed medical:  ARNI/ACE I/ARB: Yes (entresto)  Beta blocker:  Yes (coreg)  Aldosterone antagonist:  No    Physical Examination:     BP (!) 162/96   Pulse 77   Resp 18   Ht 5' 11\" (1.803 m)   Wt (!) 341 lb (154.7 kg)   SpO2 98%   BMI 47.56 kg/m²     Assessment  Charting Type: Shift assessment (CHF clinic . ambulatory, self drove)    Neurological  Level of Consciousness: Alert (0)    HEENT (Head, Ears, Eyes, Nose, & Throat)  HEENT (WDL): Within Defined Limits    Respiratory  Respiratory Pattern: Regular (sleeps on 1 pillow, no PND. no TRAMMELL no dyspnea)  Respiratory Depth: Normal  Chest Assessment: Chest expansion symmetrical  L Breath Sounds: Clear  R Breath Sounds: Clear    Cardiac  Cardiac Regularity: Regular  Heart Sounds: S1, S2    Peripheral Vascular  Peripheral Vascular (WDL): Exceptions to WDL  Edema: Left lower extremity  RLE Edema: None  LLE Edema: None    Genitourinary  Genitourinary (WDL): Within Defined Limits    Psychosocial  Psychosocial (WDL): Within Defined Limits    LAB DATA:    Last 3 BMP      Sodium (mmol/L)   Date Value   07/01/2022 137   06/15/2022 138   04/28/2022 135     Potassium (mmol/L)   Date Value   07/01/2022 3.8   06/15/2022 3.6   04/28/2022 4.1     Potassium reflex Magnesium (mmol/L)   Date Value   03/01/2022 4.2     Chloride (mmol/L)   Date Value   07/01/2022 101   06/15/2022 101   04/28/2022 102     CO2 (mmol/L)   Date Value   07/01/2022 27   06/15/2022 27   04/28/2022 23     BUN (mg/dL)   Date Value   07/01/2022 13   06/15/2022 15   04/28/2022 12     Glucose (mg/dL)   Date Value   07/01/2022 106 (H)   06/15/2022 113 (H)   04/28/2022 110 (H)     Calcium (mg/dL)   Date Value   07/01/2022 9.2   06/15/2022 9.1   04/28/2022 9.2     Last 3 BNP       Pro-BNP (pg/mL)   Date Value   07/01/2022 87   06/15/2022 167 (H)   04/28/2022 345 (H)      CBC: No results for input(s): WBC, HGB, PLT in the last 72 hours. BMP:    Recent Labs     07/01/22  0832      K 3.8      CO2 27   BUN 13   CREATININE 1.1   GLUCOSE 106*     Hepatic: No results for input(s): AST, ALT, ALB, BILITOT, ALKPHOS in the last 72 hours. Troponin: No results for input(s): TROPONINI in the last 72 hours.   BNP: No results for input(s): BNP in the last 72 hours. Lipids: No results for input(s): CHOL, HDL in the last 72 hours. Invalid input(s): LDLCALCU  INR: No results for input(s): INR in the last 72 hours. WEIGHTS:    Wt Readings from Last 3 Encounters:   07/01/22 (!) 341 lb (154.7 kg)   06/15/22 (!) 351 lb (159.2 kg)   05/09/22 (!) 347 lb 11.2 oz (157.7 kg)     TELEMETRY:  Cardiac Regularity: Regular  Cardiac Rhythm/Interpretation: SR    ASSESSMENT:  Mirta Mohamud has decrease his weight by 10lbs since the last CHF clinic appointment on 4/28/22. No use of prn lasix in the last two months. Patient has been more active outside recently. Pt seen Dr. Ana Arriola on 5/9 and increased Entresto to mid dose. He denies any dizziness/lightheadedness. Understands HF zones use and will call for s/s symptomatic CHF. Interventions completed this visit:  IV diuretics given no   Lab work obtained no BNP/BMP  Reviewed currently prescribed medications with patient, educated on importance of compliance and answered any questions regarding their medication  Educated on signs and symptoms of HF  Educated on low sodium diet    PLAN:  Scheduled to follow up in CHF clinic on     Future Appointments   Date Time Provider Echo Dias   7/11/2022  8:00 AM [facility] CHF ROOM [address] [facility]   7/15/2022  9:00 AM [address]   10/18/2022 12:15 PM Sally Gordon MD [address] [place] [address]       Given clinic phone number and aware of signs and symptoms to call with any HF change in symptoms.

## 2022-07-01 NOTE — PROGRESS NOTES
10:30 AM Received verbal orders from Omid BEAR to increase Entresto to  mg BID.       11:39 AM Called patient re: medication change. I have reviewed the provider's instructions with the patient, answering all questions to his satisfaction. Omid BEAR will send the script into the pharmacy for patient to . Patient understands to take Entresto 49-51 2 tablets in the AM and PM to make the 97-103mg dosing until picks up new scripts. Verbalized understanding and answered all questions.      Follow up labs and assessment on 7/11 at the CHF clinic:     Future Appointments   Date Time Provider Echo Dias   7/11/2022  8:00 AM [facility] ROOM [address] [facility]   7/15/2022  9:00 AM Ariel Strange DO [address] [place] [address] Street   10/18/2022 12:15 PM Ryan Gordon MD [address] [place] [address]

## 2022-07-11 ENCOUNTER — HOSPITAL ENCOUNTER (OUTPATIENT)
Dept: OTHER | Age: 47
Setting detail: THERAPIES SERIES
Discharge: HOME | End: 2022-07-11
Payer: COMMERCIAL

## 2022-07-11 ENCOUNTER — TELEPHONE (OUTPATIENT)
Dept: OTHER | Age: 47
End: 2022-07-11

## 2022-07-11 NOTE — TELEPHONE ENCOUNTER
CHF clinic appointment cancelled for today 7/11/22 due to flight getting cancelled and patient still out of town. Will call back to reschedule when he gets home.     Electronically signed by Ashok Cespedes RN on 7/11/2022 at 9:09 AM

## 2022-07-21 ENCOUNTER — OFFICE VISIT (OUTPATIENT)
Dept: FAMILY MEDICINE CLINIC | Age: 47
End: 2022-07-21
Payer: COMMERCIAL

## 2022-07-21 VITALS
SYSTOLIC BLOOD PRESSURE: 130 MMHG | BODY MASS INDEX: 44.1 KG/M2 | TEMPERATURE: 97.9 F | RESPIRATION RATE: 16 BRPM | OXYGEN SATURATION: 95 % | WEIGHT: 315 LBS | HEART RATE: 89 BPM | HEIGHT: 71 IN | DIASTOLIC BLOOD PRESSURE: 86 MMHG

## 2022-07-21 DIAGNOSIS — I50.41 ACUTE COMBINED SYSTOLIC AND DIASTOLIC CONGESTIVE HEART FAILURE (HCC): ICD-10-CM

## 2022-07-21 DIAGNOSIS — G47.33 OSA (OBSTRUCTIVE SLEEP APNEA): Primary | ICD-10-CM

## 2022-07-21 LAB
ANION GAP SERPL CALCULATED.3IONS-SCNC: 13 MMOL/L (ref 7–16)
BUN BLDV-MCNC: 16 MG/DL (ref 6–20)
CALCIUM SERPL-MCNC: 9.5 MG/DL (ref 8.6–10.2)
CHLORIDE BLD-SCNC: 103 MMOL/L (ref 98–107)
CO2: 28 MMOL/L (ref 22–29)
CREAT SERPL-MCNC: 1.1 MG/DL (ref 0.7–1.2)
GFR AFRICAN AMERICAN: >60
GFR NON-AFRICAN AMERICAN: >60 ML/MIN/1.73
GLUCOSE BLD-MCNC: 102 MG/DL (ref 74–99)
POTASSIUM SERPL-SCNC: 4.1 MMOL/L (ref 3.5–5)
PRO-BNP: 35 PG/ML (ref 0–125)
SODIUM BLD-SCNC: 144 MMOL/L (ref 132–146)

## 2022-07-21 PROCEDURE — 99214 OFFICE O/P EST MOD 30 MIN: CPT | Performed by: SURGERY

## 2022-07-21 RX ORDER — HYDRALAZINE HYDROCHLORIDE 25 MG/1
TABLET, FILM COATED ORAL
COMMUNITY
Start: 2022-06-29 | End: 2022-08-24

## 2022-07-21 RX ORDER — AMLODIPINE BESYLATE 10 MG/1
TABLET ORAL
COMMUNITY
Start: 2022-06-29 | End: 2022-09-16 | Stop reason: ALTCHOICE

## 2022-07-21 RX ORDER — FUROSEMIDE 40 MG/1
TABLET ORAL
Qty: 30 TABLET | Refills: 0 | Status: SHIPPED | OUTPATIENT
Start: 2022-07-21

## 2022-07-21 NOTE — PROGRESS NOTES
Harsha Garcia (:  1975) is a 55 y.o. male,Established patient, here for evaluation of the following chief complaint(s):  Follow-up (3 month follow up, heart failure) and Establish Care (Due for HIV screen, Hep C screen, Tdap, colonoscopy)         ASSESSMENT/PLAN:  1. JAEL (obstructive sleep apnea)  -     [address], [place], , Sleep Medicine, [place]  2. Acute combined systolic and diastolic congestive heart failure (HCC)  -     furosemide (LASIX) 40 MG tablet; take 1 tablet by mouth once daily if needed IF 3 LBS WEIGHT GAIN IN 24 HOURS OR 5 LBS IN 5 DAYS, Disp-30 tablet, R-0Normal  -     Basic Metabolic Panel; Future  -     Brain Natriuretic Peptide; Future      Return in about 4 months (around 2022) for HF. Subjective   SUBJECTIVE/OBJECTIVE:  HPI:    1. Sleep-disordered breathing  -     Sleep study was initially set for May but rescheduled (it was on a whim back then)  - Will get back on their list (due to possible intolerance of pressures)  - Consider inspire device when BMI is lower    3. Nonischemic cardiomyopathy [facility])  -     May 9 was next apt with cardiology (cancelled by Cardiology) getting back in this upcoming October  - Still at HF clinic  - They did change dose of entresto to max ()  - proBNP 87   - BMP wnl  - No ischemic symptoms, no anginal symptoms.  - Not weighing himself every day  4. Acute combined systolic and diastolic congestive heart failure (HCC)  -     [place] DISCHARGE MEDS RECONCILED W/ CURRENT OUTPATIENT MED LIST  -     Weighing self daily. Had great weight loss, has not had a day with 3# gain or more. And no 5 day period where he gained 5# or more.     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    5. Secondary hypertension  -     stable  - Stopped taking hydralazine, remains normotensive  - No changes      Preventative:  Health Maintenance   Topic Date Due    DTaP/Tdap/Td vaccine (1 - Tdap) Never done    Colorectal Cancer Screen  Never done    Flu vaccine (1) 09/01/2022    Lipids  03/02/2023    A1C test (Diabetic or Prediabetic)  03/03/2023    Depression Screen  03/10/2023    Pneumococcal 0-64 years Vaccine (2 - PCV) 03/10/2023    COVID-19 Vaccine  Completed    Hepatitis A vaccine  Aged Out    Hepatitis B vaccine  Aged Out    Hib vaccine  Aged Out    Meningococcal (ACWY) vaccine  Aged Out    Hepatitis C screen  Discontinued    HIV screen  Discontinued           ROS:    Denies 10pt ROS other than noted in HPI. Objective       PHYSICAL EXAM:    /86   Pulse 89   Temp 97.9 °F (36.6 °C) (Temporal)   Resp 16   Ht 5' 11\" (1.803 m)   Wt (!) 346 lb 12.8 oz (157.3 kg)   SpO2 95%   BMI 48.37 kg/m²     AVSS    GA: Well-groomed, appears well, no acute distress. HEENT: Atraumatic normocephalic. Extraocular muscles are grossly intact. Pupils are equal round reactive to light. Conjunctiva pink and moist.  Hearing is grossly intact. NECK: Trachea is midline,  . CARDIO: Regular rate and rhythm without murmur rub or gallop. Cap refill 2+. Radial pulses 2+ bilaterally. No jvd/  no hepatojugular reflux. RESPIRATORY: Clear to auscultation bilaterally without wheezes rales or rhonchi. Normal inspiratory and expiratory effort. Normoxic on room air    ABD: obese, normoactive bowel sounds. Soft, nontender, no organomegaly. MSK: Structurally appropriate for age. No gross deficit. NEURO: Alert, no gross deficit. PSYCH:  Mood is normal and congruent with affect. No signs of psychomotor retardation or agitation. Thought content seems normal, speech is fluent and non-pressured. SKIN: Generally warm pink and dry.                    An electronic signature was used to authenticate this note.     --Kimberly Layton, DO

## 2022-07-21 NOTE — PATIENT INSTRUCTIONS
My Goal for Self-management of Heart Failure Includes 5 steps :     1. Notice a change in symptoms ( weight gain, short of breath, leg swelling, decreased activity level, bloating. ...)     2. Evaluate the change: (use the Heart Failure Zones )      3. Decide to take action: decide what your options are, such as: (call your doctor for an extra visit, take a prescribed medication, such as your water pill if your doctor has given you directions to do so, [address])     4. Come up with a strategy:  (now you call the doctor for advice / appointment. This is where you take action!!! Do not wait, catch the symptom early and treat it before it worsens. 5. Evaluate the response: The next day, check your Heart Failure Zones: are you in the GREEN ZONE (safe zone)? Worsening symptoms of YELLOW ZONE? Or have you moved to the RED ZONE and need to call 911 or go to the Emergency Room for evaluation? Call your doctor's office to update them on your symptoms of heart failure. Learning About Heart Failure Zones  What are heart failure zones? Heart failure zones give you an easy way to see changes in your heart failure symptoms. They also tell you when you need to get help. Check every day to see which zone you are in. Green zone. You are doing well. This is where you want to be. Your weight is stable. It's not going up or down. You breathe easily. You are sleeping well. You are able to lie flat without shortness of breath. You can do your usual activities. Yellow zone. Be careful. Your symptoms are changing. Call your doctor. You have new or increased shortness of breath. You are dizzy or lightheaded, or you feel like you may faint. You have sudden weight gain, such as more than 2 to 3 pounds in a day or 5 pounds in a week. (Your doctor may suggest a different range of weight gain.)  You have increased swelling in your legs, ankles, or feet.   You are so tired or weak that you can't do your usual activities. You are not sleeping well. Shortness of breath wakes you up at night. You need extra pillows. Red zone. 911  This is an emergency. Call . You have symptoms of sudden heart failure. For example: You have severe trouble breathing. You cough up pink, foamy mucus. You have a new irregular or fast heartbeat. You have symptoms of a heart attack. These may include:  Chest pain or pressure, or a strange feeling in the chest.  Sweating. Shortness of breath. Nausea or vomiting. Pain, pressure, or a strange feeling in the back, neck, jaw, or upper belly or in one or both shoulders or arms. Lightheadedness or sudden weakness. A fast or irregular heartbeat. If you have symptoms of a heart attack 911 : After you call , the  may tell you to chew 1 adult-strength or 2 to 4 low-dose aspirin. Wait for an ambulance. Do not try to drive yourself. Follow-up care is a key part of your treatment and safety. Be sure to make and go to all appointments, and call your doctor if you are having problems. It's also a good idea to know your test results and keep a list of the medicines you take. Where can you learn more? Go to https://"[employer][email or web address] [email or web address].[place] [email or web address] [place]. org and sign in to your Droid system master account. Enter T174 in the [facility] box to learn more about \"Learning About Heart Failure Zones. \"       Keep strict control of your fluid intake as well as sodium intake (1.5L or 64oz water/liquids [anything that can be easily converted to liquid like jello, pudding, ice cream counts as a liquid by volume], sodium less than 2gm per day). Fats limit to 60g per day (no more than 20g of saturated fats)  Cholesterol limit to no more than 300mg per day  Sodium less than 2000mg per day    MyFitnessPal or similar application (Lo[employer] It application, www.noom. com, or track by journal) to monitor calories and macronutrients.   This is the most critical component to a heart healthy, balanced diet: honesty, keeping oneself accountable, ensure you are tracking daily goals and meeting them. Food is medicine! Call the HF clinic and see  if they want you in August some time for a check up in between visits with me and the Cardiologist.         ______________________________________________________  www.[employer]. [employer].com  NOOM30 discount code

## 2022-08-03 ENCOUNTER — TELEPHONE (OUTPATIENT)
Dept: OTHER | Age: 47
End: 2022-08-03

## 2022-08-03 NOTE — TELEPHONE ENCOUNTER
Left message for patient to reschedule missed CHF clinic appointment. Call back number provided to patient.       Electronically signed by Zenaida Bwoman RN on 8/3/2022 at 5:32 PM

## 2022-08-03 NOTE — PROGRESS NOTES
Left message for patient to reschedule missed CHF clinic appointment. Call back number provided to patient.      Electronically signed by Torsten Méndez RN on 8/3/2022 at 5:32 PM

## 2022-08-24 RX ORDER — ATORVASTATIN CALCIUM 40 MG/1
TABLET, FILM COATED ORAL
Qty: 30 TABLET | Refills: 5 | Status: SHIPPED | OUTPATIENT
Start: 2022-08-24

## 2022-08-24 RX ORDER — HYDRALAZINE HYDROCHLORIDE 25 MG/1
TABLET, FILM COATED ORAL
Qty: 60 TABLET | Refills: 5 | Status: SHIPPED
Start: 2022-08-24 | End: 2022-10-18 | Stop reason: SDUPTHER

## 2022-09-09 ENCOUNTER — TELEPHONE (OUTPATIENT)
Dept: CARDIOLOGY CLINIC | Age: 47
End: 2022-09-09

## 2022-09-09 ENCOUNTER — HOSPITAL ENCOUNTER (OUTPATIENT)
Dept: OTHER | Age: 47
Setting detail: THERAPIES SERIES
Discharge: HOME | End: 2022-09-09
Payer: COMMERCIAL

## 2022-09-09 ENCOUNTER — TELEPHONE (OUTPATIENT)
Dept: OTHER | Age: 47
End: 2022-09-09

## 2022-09-09 VITALS
RESPIRATION RATE: 16 BRPM | WEIGHT: 315 LBS | OXYGEN SATURATION: 96 % | DIASTOLIC BLOOD PRESSURE: 81 MMHG | HEIGHT: 71 IN | HEART RATE: 68 BPM | BODY MASS INDEX: 44.1 KG/M2 | SYSTOLIC BLOOD PRESSURE: 123 MMHG

## 2022-09-09 LAB
ANION GAP SERPL CALCULATED.3IONS-SCNC: 10 MMOL/L (ref 7–16)
BUN BLDV-MCNC: 13 MG/DL (ref 6–20)
CALCIUM SERPL-MCNC: 9.4 MG/DL (ref 8.6–10.2)
CHLORIDE BLD-SCNC: 102 MMOL/L (ref 98–107)
CO2: 28 MMOL/L (ref 22–29)
CREAT SERPL-MCNC: 1.1 MG/DL (ref 0.7–1.2)
GFR AFRICAN AMERICAN: >60
GFR NON-AFRICAN AMERICAN: >60 ML/MIN/1.73
GLUCOSE BLD-MCNC: 124 MG/DL (ref 74–99)
POTASSIUM SERPL-SCNC: 3.7 MMOL/L (ref 3.5–5)
PRO-BNP: 58 PG/ML (ref 0–125)
SODIUM BLD-SCNC: 140 MMOL/L (ref 132–146)

## 2022-09-09 PROCEDURE — 83880 ASSAY OF NATRIURETIC PEPTIDE: CPT

## 2022-09-09 PROCEDURE — 99214 OFFICE O/P EST MOD 30 MIN: CPT

## 2022-09-09 PROCEDURE — 80048 BASIC METABOLIC PNL TOTAL CA: CPT

## 2022-09-09 PROCEDURE — 36415 COLL VENOUS BLD VENIPUNCTURE: CPT

## 2022-09-09 NOTE — PROGRESS NOTES
Congestive Heart Failure [address]   1975    Referring Provider: Dr. Ellen Kidd  Primary Care Physician: Dr. Thelma Clayton  Cardiologist: Dr. Ellen Kidd    History of Present Illness:     Zahra Brewer is a 55 y.o. male with a history of HFrEF, most recent EF 33-36% from HANNA 2-. Patient Story:  He does not  have dyspnea with exertion, shortness of breath, or decline in overall functional capacity. He does not have orthopnea, PND, nocturnal cough or hemoptysis. He does not have abdominal distention or bloating, early satiety, anorexia/change in appetite. He does has a good urinary response to  oral diuretic. He does  have  trace lower extremity edema bilaterally. He denies lightheadedness, dizziness. He denies palpitations, syncope or near syncope. He does not complain of chest pain, pressure, discomfort. No Known Allergies    Prior to Visit Medications    Medication Sig Taking?  Authorizing Provider   atorvastatin (LIPITOR) 40 MG tablet take 1 tablet by mouth once daily  Odessa Gordon MD   hydrALAZINE (APRESOLINE) 25 MG tablet take 1 tablet by mouth IN THE MORNING and at bedtime  Patient taking differently: 50 mg 3 times daily  Odessa Gordon MD   amLODIPine (NORVASC) 10 MG tablet take 1 tablet by mouth once daily  Historical Provider, MD   furosemide (LASIX) 40 MG tablet take 1 tablet by mouth once daily if needed IF 3 LBS WEIGHT GAIN IN 24 HOURS OR 5 LBS IN 5 DAYS  Amada Rick,    sacubitril-valsartan (ENTRESTO)  MG per tablet Take 1 tablet by mouth 2 times daily  Odessa Gordon MD   isosorbide mononitrate (IMDUR) 30 MG extended release tablet take 1 tablet by mouth once daily  Odessa Gordon MD   aspirin 81 MG chewable tablet Take 1 tablet by mouth daily  Giacomo De Santiago MD   carvedilol (COREG) 25 MG tablet Take 1 tablet by mouth 2 times daily  Rikki Bianchi MD     Guideline directed medical:  ARNI/ACE I/ARB: Yes Entresto  mg BID   Beta blocker:  Yes Coreg  25 mg BID  Aldosterone antagonist:  No  SGLT2i: no     Physical Examination:     /81   Pulse 68   Resp 16   Ht 5' 11\" (1.803 m)   Wt (!) 343 lb (155.6 kg)   SpO2 96%   BMI 47.84 kg/m²     Assessment  Charting Type: Shift assessment (CHF clinic . ambulatory, self drove)      HEENT (Head, Ears, Eyes, Nose, & Throat)  HEENT (WDL): Within Defined Limits    Respiratory  Respiratory Pattern: Regular  Respiratory Depth: Normal  Chest Assessment: Chest expansion symmetrical  L Breath Sounds: Clear  R Breath Sounds: Clear    Cardiac  Cardiac Regularity: Regular  Heart Sounds: S1, S2    Peripheral Vascular  Peripheral Vascular (WDL): Exceptions to WDL  Edema: Left lower extremity  RLE Edema: Trace  LLE Edema: Trace    Genitourinary  Genitourinary (WDL): Within Defined Limits    Psychosocial  Psychosocial (WDL): Within Defined Limits    LAB DATA:    Last 3 BMP      Sodium (mmol/L)   Date Value   09/09/2022 140   07/21/2022 144   07/01/2022 137     Potassium (mmol/L)   Date Value   09/09/2022 3.7   07/21/2022 4.1   07/01/2022 3.8     Potassium reflex Magnesium (mmol/L)   Date Value   03/01/2022 4.2     Chloride (mmol/L)   Date Value   09/09/2022 102   07/21/2022 103   07/01/2022 101     CO2 (mmol/L)   Date Value   09/09/2022 28   07/21/2022 28   07/01/2022 27     BUN (mg/dL)   Date Value   09/09/2022 13   07/21/2022 16   07/01/2022 13     Glucose (mg/dL)   Date Value   09/09/2022 124 (H)   07/21/2022 102 (H)   07/01/2022 106 (H)     Calcium (mg/dL)   Date Value   09/09/2022 9.4   07/21/2022 9.5   07/01/2022 9.2     Last 3 BNP       Pro-BNP (pg/mL)   Date Value   09/09/2022 58   07/21/2022 35   07/01/2022 87      CBC: No results for input(s): WBC, HGB, PLT in the last 72 hours.   BMP:    Recent Labs     09/09/22  0934      K 3.7      CO2 28   BUN 13   CREATININE 1.1   GLUCOSE 124*     Hepatic: No results for input(s): AST, ALT, ALB, BILITOT, ALKPHOS in the last 72 hours. Troponin: No results for input(s): TROPONINI in the last 72 hours. BNP: No results for input(s): BNP in the last 72 hours. Lipids: No results for input(s): CHOL, HDL in the last 72 hours. Invalid input(s): LDLCALCU  INR: No results for input(s): INR in the last 72 hours. WEIGHTS:    Wt Readings from Last 3 Encounters:   09/09/22 (!) 343 lb (155.6 kg)   07/21/22 (!) 346 lb 12.8 oz (157.3 kg)   07/01/22 (!) 341 lb (154.7 kg)     TELEMETRY:  Cardiac Regularity: Regular  Cardiac Rhythm/Interpretation: SR    ASSESSMENT:  Naresh Mercedes present for a follow up appointment. Pt has not been seen at the CHF clinic since 7/2022. In July he was increased to Entresto  mg BID. Pt feels great. No use of prn lasix in the last three months. Pt is weighting himself at home and overall stable weights. Understands HF zones use and will call for s/s symptomatic CHF. Upon assessment bilateral lungs are clear, abdomen soft and trace edema BLE. Appointment with Dr. Christiano Mathis on 10/18 for a repeat echocardiogram.     /81   Pulse 68   Resp 16   Ht 5' 11\" (1.803 m)   Wt (!) 343 lb (155.6 kg)   SpO2 96%   BMI 47.84 kg/m²     Follow up in one week for possible titration of cardiac medications. Will send this note to the provider. Interventions completed this visit:  IV diuretics given no   Lab work obtained no BNP/BMP   Reviewed currently prescribed medications with patient, educated on importance of compliance and answered any questions regarding their medication  Educated on signs and symptoms of HF  Educated on low sodium diet    CHF RN called the pharmacy re: medications d/t the patient not having knowing medications. Gained an accurate list of the current mediations patient is currently taking and picked up from the pharmacy. Sent a messaged Angelina Alford re: duplicate hydralazine doses and pt not taking imdur for the last three months. PLAN:  Scheduled to follow up in CHF clinic on     Future Appointments   Date Time Provider Echo Dias   9/14/2022  7:30 AM [facility] CHF ROOM [address]   10/18/2022 12:15 PM Sarthak Anders MD [address] [facility]   10/20/2022  8:00 AM Gerhardt Rands [place], DO [facility]   12/2/2022  8:45 AM [facility]       Given clinic phone number and aware of signs and symptoms to call with any HF change in symptoms.

## 2022-09-09 NOTE — TELEPHONE ENCOUNTER
Message sent to Melissa Salas with Dr. Rosa Mendes for medication clarification on Imdur (patient has not been taking) and dosing of hydralazine.     Electronically signed by Carlos Enrique Pantoja RN on 9/9/2022 at 2:56 PM

## 2022-09-09 NOTE — TELEPHONE ENCOUNTER
----- Message from Aleida Davey, RN sent at 9/9/2022  9:39 AM EDT -----  Regarding: Medication clarification  Lizzy Maciel,    Patient came to CHF clinic today (we have not seen him since 7[place]1 [place] There was a question regarding if patient should be on IMDUR or not. Patient last picked medication up from pharmacy on 5/31/22 but it ran out and he has not taken in approximately 3 months. Called to get an accurate med list from pharmacy and it was brought to my attention patient's has 2 different scripts for hydralazine. 1 from Dr. Sha Marinelli ( hydralazine 25 mg twice/ day ) and 1 from PCP Dr. Yaw Rowan ( hydralazine 50 mg TID---- .)  Patient has picked up both scripts in August but has been taking the higher dose. Just concerned there are 2 scripts for the same medication being filled. Can you please check with Dr. Sha Marinelli? Thank you.     Aleida Davey

## 2022-09-14 ENCOUNTER — HOSPITAL ENCOUNTER (OUTPATIENT)
Dept: OTHER | Age: 47
Setting detail: THERAPIES SERIES
Discharge: HOME | End: 2022-09-14
Payer: COMMERCIAL

## 2022-09-14 ENCOUNTER — TELEPHONE (OUTPATIENT)
Dept: OTHER | Age: 47
End: 2022-09-14

## 2022-09-14 NOTE — TELEPHONE ENCOUNTER
12:02 PM 1975  Alda Ward    Called patient to reschedule his CHF clinic appointment. Left a voicemail and contact number provided.

## 2022-09-16 ENCOUNTER — TELEPHONE (OUTPATIENT)
Dept: OTHER | Age: 47
End: 2022-09-16

## 2022-09-16 ENCOUNTER — TELEPHONE (OUTPATIENT)
Dept: CARDIOLOGY CLINIC | Age: 47
End: 2022-09-16

## 2022-09-16 DIAGNOSIS — Z79.899 NEW MEDICATION ADDED: ICD-10-CM

## 2022-09-16 DIAGNOSIS — I50.22 CHRONIC SYSTOLIC CONGESTIVE HEART FAILURE (HCC): Primary | ICD-10-CM

## 2022-09-16 RX ORDER — SPIRONOLACTONE 25 MG/1
25 TABLET ORAL DAILY
Qty: 90 TABLET | Refills: 1 | Status: SHIPPED | OUTPATIENT
Start: 2022-09-16

## 2022-09-16 NOTE — TELEPHONE ENCOUNTER
Call placed to patient to reschedule with CHF clinic. No answer, message left with call back info. Will try again next week.     Electronically signed by Chraly Tidwell RN on 9/16/2022 at 3:36 PM

## 2022-09-16 NOTE — TELEPHONE ENCOUNTER
Dona Farfan  1975    Spoke with patient re: follow up lab work for medication titration. Scheduled patient for the CHF clinic on :      Future Appointments   Date Time Provider Echo Dias   9/20/2022  7:30 AM [facility]   10/18/2022 12:15 PM Sylvia Yu MD [facility]   10/20/2022  8:00 AM Celestine Watson DO [facility]   12/2/2022  8:45 AM [facility]

## 2022-09-16 NOTE — TELEPHONE ENCOUNTER
I have reviewed the provider's instructions with the patient, answering all questions to his satisfaction. Reviewed instructions x 2 on phone, he verbalized understanding.

## 2022-09-16 NOTE — TELEPHONE ENCOUNTER
----- Message from ÁNGELA Baldwin CNP sent at 2022  8:59 AM EDT -----  Regarding: RE: Medication clarification  Vitals at CHF clinic visit : /81   Pulse 68   (While patient taking Hydralazine 50 mg TID)    Current GDMT:  Entresto 97/103 mg BID  Coreg 25 mg BID  Hydralazine 50 mg TID  Lasix 40 mg PRN    Also on norvasc 10 mg daily   Currently not taking Imdur 30 mg daily as script        Continue hydralazine at current dose as he is taking  Stop norvasc to allow titration of GDMT  Start spironolactone 25 mg daily  Plan to add SGLT2i as able   Follow up labs in 4 days      Thanks!      ----- Message -----  From: Char Puckett RN  Sent: 2022   9:47 AM EDT  To: ÁNGELA Nur CNP  Subject: Medication clarification                         Fransisco Carlton,    Patient came to CHF clinic today (we have not seen him since  due to [place]). There was a question regarding if patient should be on IMDUR or not. Patient last picked medication up from pharmacy on 22 but it ran out and he has not taken in approximately 3 months. Called to get an accurate med list from pharmacy and it was brought to my attention patient's has 2 different scripts for hydralazine. 1 from Dr. Madan Acuna ( hydralazine 25 mg twice/ day ) and 1 from PCP Dr. Rashi Lawson ( hydralazine 50 mg TID---- .)  Patient has picked up both scripts in August but has been taking the higher dose. Just concerned there are 2 scripts for the same medication being filled. Can you please check with Dr. Maadn Acuna? Thank you.     Char Puckett

## 2022-09-20 ENCOUNTER — HOSPITAL ENCOUNTER (OUTPATIENT)
Dept: OTHER | Age: 47
Setting detail: THERAPIES SERIES
Discharge: HOME | End: 2022-09-20
Payer: COMMERCIAL

## 2022-09-20 VITALS
BODY MASS INDEX: 44.1 KG/M2 | SYSTOLIC BLOOD PRESSURE: 152 MMHG | RESPIRATION RATE: 18 BRPM | HEART RATE: 69 BPM | DIASTOLIC BLOOD PRESSURE: 88 MMHG | HEIGHT: 71 IN | WEIGHT: 315 LBS

## 2022-09-20 LAB
ANION GAP SERPL CALCULATED.3IONS-SCNC: 11 MMOL/L (ref 7–16)
BUN BLDV-MCNC: 11 MG/DL (ref 6–20)
CALCIUM SERPL-MCNC: 8.9 MG/DL (ref 8.6–10.2)
CHLORIDE BLD-SCNC: 101 MMOL/L (ref 98–107)
CO2: 26 MMOL/L (ref 22–29)
CREAT SERPL-MCNC: 1 MG/DL (ref 0.7–1.2)
GFR AFRICAN AMERICAN: >60
GFR NON-AFRICAN AMERICAN: >60 ML/MIN/1.73
GLUCOSE BLD-MCNC: 130 MG/DL (ref 74–99)
POTASSIUM SERPL-SCNC: 3.7 MMOL/L (ref 3.5–5)
PRO-BNP: 79 PG/ML (ref 0–125)
SODIUM BLD-SCNC: 138 MMOL/L (ref 132–146)

## 2022-09-20 PROCEDURE — 36415 COLL VENOUS BLD VENIPUNCTURE: CPT

## 2022-09-20 PROCEDURE — 80048 BASIC METABOLIC PNL TOTAL CA: CPT

## 2022-09-20 PROCEDURE — 99214 OFFICE O/P EST MOD 30 MIN: CPT

## 2022-09-20 PROCEDURE — 83880 ASSAY OF NATRIURETIC PEPTIDE: CPT

## 2022-09-20 NOTE — PROGRESS NOTES
Congestive Heart Failure [address] [place] [address]   1975    Referring Provider: Dr. Christiano Mathis  Primary Care Physician: Dr. Giulia Arana  Cardiologist: Dr. Christiano Mathis    History of Present Illness:     Naresh Mercedes is a 55 y.o. male with a history of HFrEF, most recent EF 33-36% from HANNA 2-. Patient Story:  He does not  have dyspnea with exertion, shortness of breath, or decline in overall functional capacity. He does not have orthopnea, PND, nocturnal cough or hemoptysis. He does not have abdominal distention or bloating, early satiety, anorexia/change in appetite. He does has a good urinary response to  oral diuretic. He does  have  trace lower extremity edema bilaterally. He denies lightheadedness, dizziness. He denies palpitations, syncope or near syncope. He does not complain of chest pain, pressure, discomfort. No Known Allergies    Prior to Visit Medications    Medication Sig Taking?  Authorizing Provider   spironolactone (ALDACTONE) 25 MG tablet Take 1 tablet by mouth daily  Gay Goldmann, APRN - CNP   atorvastatin (LIPITOR) 40 MG tablet take 1 tablet by mouth once daily  Radha Gordon MD   hydrALAZINE (APRESOLINE) 25 MG tablet take 1 tablet by mouth IN THE MORNING and at bedtime  Patient taking differently: 50 mg 3 times daily  Al Gordon MD   furosemide (LASIX) 40 MG tablet take 1 tablet by mouth once daily if needed IF 3 LBS WEIGHT GAIN IN 24 HOURS OR 5 LBS IN 5 DAYS  Patient not taking: Reported on 9/20/2022  Fang Khanna DO   sacubitril-valsartan (ENTRESTO)  MG per tablet Take 1 tablet by mouth 2 times daily  Radha Gordon MD   aspirin 81 MG chewable tablet Take 1 tablet by mouth daily  Marleni Urena MD   carvedilol (COREG) 25 MG tablet Take 1 tablet by mouth 2 times daily  Julissa Elizabeth MD     Guideline directed medical:  ARNI/ACE I/ARB: Yes Entresto  mg BID   Beta blocker:  Yes Coreg  25 mg BID  Aldosterone antagonist:  Yes Spirolactone 25mg daily   SGLT2i: no     Physical Examination:     BP (!) 152/88 Comment: pt took his medications at 700 AM  Pulse 69   Resp 18   Ht 5' 11\" (1.803 m)   Wt (!) 345 lb (156.5 kg)   BMI 48.12 kg/m²     Assessment  Charting Type: Shift assessment (CHF clinic . ambulatory, self drove)      HEENT (Head, Ears, Eyes, Nose, & Throat)  HEENT (WDL): Within Defined Limits    Respiratory  Respiratory Pattern: Regular  Respiratory Depth: Normal  Chest Assessment: Chest expansion symmetrical  L Breath Sounds: Clear  R Breath Sounds: Clear    Cardiac  Cardiac Regularity: Regular  Heart Sounds: S1, S2    Peripheral Vascular  Peripheral Vascular (WDL): Exceptions to WDL  Edema: Left lower extremity  RLE Edema: Trace  LLE Edema: Trace    Genitourinary  Genitourinary (WDL): Within Defined Limits    Psychosocial  Psychosocial (WDL): Within Defined Limits    LAB DATA:    Last 3 BMP      Sodium (mmol/L)   Date Value   09/20/2022 138   09/09/2022 140   07/21/2022 144     Potassium (mmol/L)   Date Value   09/20/2022 3.7   09/09/2022 3.7   07/21/2022 4.1     Potassium reflex Magnesium (mmol/L)   Date Value   03/01/2022 4.2     Chloride (mmol/L)   Date Value   09/20/2022 101   09/09/2022 102   07/21/2022 103     CO2 (mmol/L)   Date Value   09/20/2022 26   09/09/2022 28   07/21/2022 28     BUN (mg/dL)   Date Value   09/20/2022 11   09/09/2022 13   07/21/2022 16     Glucose (mg/dL)   Date Value   09/20/2022 130 (H)   09/09/2022 124 (H)   07/21/2022 102 (H)     Calcium (mg/dL)   Date Value   09/20/2022 8.9   09/09/2022 9.4   07/21/2022 9.5     Last 3 BNP       Pro-BNP (pg/mL)   Date Value   09/20/2022 79   09/09/2022 58   07/21/2022 35      CBC: No results for input(s): WBC, HGB, PLT in the last 72 hours.   BMP:    Recent Labs     09/20/22  0739      K 3.7      CO2 26   BUN 11   CREATININE 1.0   GLUCOSE 130*       Hepatic: No results for input(s): AST, ALT, ALB, BILITOT, ALKPHOS

## 2022-09-21 NOTE — RESULT ENCOUNTER NOTE
Labs and CHF clinic note reviewed  Vitals at CHF clinic: BP (!) 152/88  Pulse 69 (took morning med 30 min prior to appointment)    Current GDMT:  Entresto 97/103 mg BID  Coreg 25 mg BID  Hydralazine 50 mg TID  Spironolactone 25 mg daily   Lasix 40 mg PRN    Please have him start Jardiance vs Farxiga 10 mg daily (whichever is covered by his insurance) follow up labs in 1 week     Thank you

## 2022-09-21 NOTE — TELEPHONE ENCOUNTER
MD Angelina Chapin  Caller: Unspecified (1 week ago)  Please arrange for patient to follow-up     Left message for patient to contact office.

## 2022-09-22 ENCOUNTER — TELEPHONE (OUTPATIENT)
Dept: CARDIOLOGY CLINIC | Age: 47
End: 2022-09-22

## 2022-09-22 DIAGNOSIS — I50.22 CHRONIC SYSTOLIC CONGESTIVE HEART FAILURE (HCC): Primary | ICD-10-CM

## 2022-09-22 DIAGNOSIS — Z79.899 NEW MEDICATION ADDED: ICD-10-CM

## 2022-09-22 NOTE — TELEPHONE ENCOUNTER
----- Message from Gay Goldmann, APRN - CNP sent at 9/21/2022  8:37 AM EDT -----  Labs and CHF clinic note reviewed  Vitals at CHF clinic: BP (!) 152/88  Pulse 69 (took morning med 30 min prior to appointment)    Current GDMT:  Entresto 97/103 mg BID  Coreg 25 mg BID  Hydralazine 50 mg TID  Spironolactone 25 mg daily   Lasix 40 mg PRN    Please have him start Jardiance vs Farxiga 10 mg daily (whichever is covered by his insurance) follow up labs in 1 week     Thank you

## 2022-09-22 NOTE — TELEPHONE ENCOUNTER
PA generated via epic to cover my meds for Jardiance      Pending PA approval. Copay card is ready for use once PA is completed. Called and spoke with his mom,. who was given his instructions. She will have him call office if any questions. I mailed education materials on jardiance and the copay card along with f/u lab scripts. Te Mayo RN       Prior Authorization History  empagliflozin (Zada Humbles) 10 MG tablet     History  PA Detail   View all authorizations for this medication     Closed  9/22/2022  4:10 PM  Case ID: [id number] Close reason: Prior Authorization not required for patient/medication   Note from payer: [employer] has predicted that this prior auth will not be required.    Payer:  [employer] Generic Payer    [phone]         Waiting for Payer Response  9/22/2022  4:10 PM  Sending user: Te Mayo RN   Payer:  [employer] Generic Payer    [phone]

## 2022-09-27 ENCOUNTER — HOSPITAL ENCOUNTER (OUTPATIENT)
Dept: OTHER | Age: 47
Setting detail: THERAPIES SERIES
Discharge: HOME | End: 2022-09-27
Payer: COMMERCIAL

## 2022-09-27 VITALS
BODY MASS INDEX: 44.1 KG/M2 | WEIGHT: 315 LBS | OXYGEN SATURATION: 96 % | DIASTOLIC BLOOD PRESSURE: 90 MMHG | HEIGHT: 71 IN | RESPIRATION RATE: 16 BRPM | HEART RATE: 66 BPM | SYSTOLIC BLOOD PRESSURE: 142 MMHG

## 2022-09-27 LAB
ANION GAP SERPL CALCULATED.3IONS-SCNC: 12 MMOL/L (ref 7–16)
BUN BLDV-MCNC: 16 MG/DL (ref 6–20)
CALCIUM SERPL-MCNC: 9.1 MG/DL (ref 8.6–10.2)
CHLORIDE BLD-SCNC: 99 MMOL/L (ref 98–107)
CO2: 26 MMOL/L (ref 22–29)
CREAT SERPL-MCNC: 1.2 MG/DL (ref 0.7–1.2)
GFR AFRICAN AMERICAN: >60
GFR NON-AFRICAN AMERICAN: >60 ML/MIN/1.73
GLUCOSE BLD-MCNC: 107 MG/DL (ref 74–99)
POTASSIUM SERPL-SCNC: 3.6 MMOL/L (ref 3.5–5)
PRO-BNP: 63 PG/ML (ref 0–125)
SODIUM BLD-SCNC: 137 MMOL/L (ref 132–146)

## 2022-09-27 PROCEDURE — 36415 COLL VENOUS BLD VENIPUNCTURE: CPT

## 2022-09-27 PROCEDURE — 83880 ASSAY OF NATRIURETIC PEPTIDE: CPT

## 2022-09-27 PROCEDURE — 99204 OFFICE O/P NEW MOD 45 MIN: CPT

## 2022-09-27 PROCEDURE — 80048 BASIC METABOLIC PNL TOTAL CA: CPT

## 2022-09-27 NOTE — DISCHARGE INSTRUCTIONS
HEART FAILURE  / CONGESTIVE HEART FAILURE  DISCHARGE INSTRUCTIONS:  GUIDELINES TO FOLLOW AT HOME    Self- Managed Care:     MEDICATIONS:  Take your medication as directed. If you are experiencing any side effects, inform your doctor, Do not stop taking any of your medications without letting your doctor know. Check with your doctor before taking any over-the-counter medications / herbal / or dietary supplements. They may interfere with your other medications. Do not take ibuprofen (Advil or Motrin) and naproxen (Aleve) without talking to your doctor first. They could make your heart failure worse. WEIGHT MONITORING:   Weigh yourself everyday (with the same scale) around the same time of the day and write it down. (you can chart them on a calendar or keep track of them on paper. Notify your doctor of a weight gain of 3 pounds or more in 1 day   OR a total of 5 pounds or more in 1 week    Take your weight record to your doctor visits  Also, the same goes if you loose more than 3# in one day, let your heart doctor know. DIET:   Cardiac heart healthy diet- Low saturated / low trans fat, no added salt, caffeine restricted, Low sodium diet-   No more than 2,000mg (2 grams) of salt / sodium per day (which equals to a little less than  a teaspoon of salt)  If your doctor wants you on a fluid restriction. ..it is usually recommended a fluid limit of 2,000cc -  Fluid restriction- 2,000 ml (milliliters) = 64 ounces = you can have 8 glasses of fluid per day (each glass 8 ounces)    Follow a low salt diet - avoid using salt at the table, avoid / limit use of canned soups, processed / packaged foods, salted snacks, olives and pickles. Do not use a salt substitute without checking with your doctor, they may contain a high amount of potassioum. (Mrs. Deshawn Wong is safe to use).     Limit the use of alcohol       CALL YOUR DOCTOR THE FIRST DAY YOU NOTICE ANY OF THESE   SYMPTOMS:  You have a weight gain of 3 pounds or more in 1 day         OR 5 pounds or more in one week  More shortness of breath  More swelling of your stomach, legs, ankles or feet  Feeling more tired, No energy  Dry hacky cough  Dizziness  More chest pain / discomfort       (CALL 911 IF ANY OF THE FOLLOWING OCCURS  Chest pain (not relieved with nitroglycerine, if you have been prescribed this medication)  Severe shortness of breath  Faint / Pass out  Confusion / cannot think clearly  If symptoms get worse           SMOKING - TOBACCO USE:  * IF YOU SMOKE - STOP! Kick the habit. [address] Program is offered at [facility] and [address] [facility]. Call [phone] extension [phone] for more information. ACTIVITY:   (Ask your doctor when you will be able to return to work and before starting any exercise program.  Do not drive unless unless your doctor has given you permission to do so). Start light exercise. Even if you can only do a small amount, exercise will help you get stronger, have more energy, help manage your weight and decrease  stress. Walking is an easy way to get exercise. Start out slowly and  increase the amount you walk as tolerated  If you become short of breath, dizzy or have chest pain; stop, sit down, and rest.  If you feel \"wiped out\" the day after you exercise, walk at a slower pace or for a shorter distance. You can gradually increase the pace or amount of time. (Do not exercise right after a meal or in extreme temperatures, such as above 85 degrees, if the air is really humid, or wind chill is less than 20 degrees)                                             ADDITIONAL INFORMATION:  Avoid getting sick from colds and the flu. Stay away from friends or family that you know may have a contagious illness  Get plenty of rest   Get a flu shot each year. Get a pneumococcal vaccine shot.  If you have had one before, ask your doctor whether you need another dose. My Goal for Self-management of Heart Failure Includes 5 steps :    1. Notice a change in symptoms ( weight gain, short of breath, leg swelling, decreased activity level, bloating. ...)    2. Evaluate the change: (use the Heart Failure Zones )     3. Decide to take action: decide what your options are, such as: (call your doctor for an extra visit, take a prescribed medication, such as your water pill if your doctor has given you directions to do so, [address])    4. Come up with a strategy:  (now you call the doctor for advice / appointment. This is where you take action!!! Do not wait, catch the symptom early and treat it before it worsens. 5. Evaluate the response: The next day, check your Heart Failure Zones: are you in the GREEN ZONE (safe zone)? Worsening symptoms of YELLOW ZONE? Or have you moved to the RED ZONE and need to call 911 or go to the Emergency Room for evaluation? Call your doctor's office to update them on your symptoms of heart failure. Cardiac Rehabilitation: Care Instructions  Overview     Cardiac rehabilitation is a program for people who have a heart problem, such as a heart attack, heart failure, or a heart valve disease. The program includes exercise, education, and emotional support. Cardiac rehab can help you improve the quality of your life through better overall health. Your cardiac rehab team will include several people, including your doctor, a nurse specialist, a dietitian, and a physical therapist. They will design your cardiac rehab program specifically for you. You will learn how to reduce your risk for heart problems and how to manage stress. You can learn how to stay active, eat healthy, stay at a healthy weight, and manage other health problems. Your rehab team also can help you quit smoking. By the end of the program, you will be ready to maintain a healthy lifestyle on your own.   Follow-up care is a key part of your treatment and safety. Be sure to make and go to all appointments, and call your doctor if you are having problems. It's also a good idea to know your test results and keep a list of the medicines you take. How can you care for yourself at home? Take your medicines exactly as prescribed. Call your doctor if you think you are having a problem with your medicine. You will get more details on the specific medicines your doctor prescribes. Weigh yourself every day if your doctor tells you to. Watch for sudden weight gain. Weigh yourself on the same scale with the same amount of clothing at the same time of day. Eat heart-healthy foods. These foods include vegetables, fruits, nuts, beans, lean meat, fish, and whole grains. Limit things that are not so good for your heart, like sodium, alcohol, and sugar. Learn how to take your pulse so that you can track your heart rate during exercise. Always check with your rehab team or your doctor before you begin a new exercise program.  Follow instructions from your rehab team about exercising at home. The rehab team can help make a program for you. Stop exercising if you have any unusual discomfort, such as chest pain. Do not smoke. Smoking can make heart problems worse. If you need help quitting, talk to your doctor or rehab team about stop-smoking programs and medicines. These can increase your chances of quitting for good. When should you call for help? Call 911 anytime you think you may need emergency care. For example, call if:    You have severe trouble breathing. You cough up pink, foamy mucus and you have trouble breathing. You have symptoms of a heart attack. These may include:  Chest pain or pressure, or a strange feeling in the chest.  Sweating. Shortness of breath. Nausea or vomiting. Pain, pressure, or a strange feeling in the back, neck, jaw, or upper belly or in one or both shoulders or arms. Lightheadedness or sudden weakness.   A fast or irregular heartbeat. After you call 911, the  may tell you to chew 1 adult-strength or 2 to 4 low-dose aspirin. Wait for an ambulance. Do not try to drive yourself. You have angina symptoms (such as chest pain or pressure) that do not go away with rest or are not getting better within 5 minutes after you take a dose of nitroglycerin. You have symptoms of a stroke. These may include:  Sudden numbness, tingling, weakness, or loss of movement in your face, arm, or leg, especially on only one side of your body. Sudden vision changes. Sudden trouble speaking. Sudden confusion or trouble understanding simple statements. Sudden problems with walking or balance. A sudden, severe headache that is different from past headaches. You passed out (lost consciousness). Call your doctor now or seek immediate medical care if:    You have new or increased shortness of breath. You are dizzy or lightheaded, or you feel like you may faint. You gain weight suddenly, such as more than 2 to 3 pounds in a day or 5 pounds in a week. (Your doctor may suggest a different range of weight gain.)     You have increased swelling in your legs, ankles, or feet. Watch closely for changes in your health, and be sure to contact your doctor if you have any problems. Where can you learn more? Go to https://K[employer]oph[employer]SitScape.Podaddies. org and sign in to your [employer] account. Enter E746 in the [facility] box to learn more about \"Cardiac Rehabilitation: Care Instructions. \"     If you do not have an account, please click on the \"Sign Up Now\" link. Current as of: January 10, 2022               Content Version: 13.4  © 2034-2276 [employer]. Care instructions adapted under license by [facility] ([facility]).  If you have questions about a medical condition or this instruction, always ask your healthcare professional. Marina Castellon any warranty or liability for your use of this information. Medicines for Heart Failure: Care Instructions  Your Care Instructions     Most people with heart failure are helped by taking several medicines to protect their heart. These medicines can help you feel better and live longer. It is important to take all your medicines exactly as prescribed to get the best results. They can also cause side effects. If you think that any of your medicines are causing side effects, talk with your doctor. Follow-up care is a key part of your treatment and safety. Be sure to make and go to all appointments. Call your doctor if you are having problems. It's also a good idea to know your test results and keep a list of the medicines you take. What medicines are used for heart failure? Aldosterone receptor antagonists. These are a type of diuretic. They make the kidneys get rid of extra fluid. Angiotensin-converting enzyme (ACE) inhibitors help blood flow. They relax the blood vessels and lower your blood pressure. The heart can then pump more blood through your body without working harder. Angiotensin II receptor blockers (ARBs) work like ACE inhibitors. Some people use them instead. Angiotensin receptor neprilysin inhibitor (ARNI) medicine works like ARBs and ACE inhibitors. Some people take an ARNI medicine instead. Beta-blockers can slow the heart rate and decrease blood pressure. Digoxin relieves symptoms in some people with heart failure. Diuretics reduce swelling. They do this by helping the kidneys get rid of excess fluid. They are also called water pills. Hydralazine. This may be taken with a nitrate to widen blood vessels. It can lower blood pressure and reduce the workload on the heart. Ivabradine slows the heart rate. SGLT2 inhibitors help remove extra glucose through the urine. What should you know about these medicines? This is not a complete list of medicines used for heart failure.  If you have questions about your medicine, ask your doctor or pharmacist.  ACE inhibitors  Before you start to take an ACE inhibitor, talk to your doctor. Tell him or her if:  You take any anti-inflammatory medicines. These include ibuprofen (Advil, Motrin) and naproxen (Aleve). You take antacids, potassium pills or a salt substitute, or lithium. You take a diuretic. You are pregnant or breastfeeding or you plan to get pregnant. You have kidney disease. Side effects include:  A dry cough. If the cough is bad enough to make you stop the medicine, talk to your doctor. You may need to take a different one. Feeling lightheaded. This may happen when you stand up too fast. It usually gets better with time. Angiotensin II receptor blockers (ARBs)  Before you start to take an ARB, talk to your doctor. Tell him or her if:  You take any anti-inflammatory medicines. These include ibuprofen (Advil, Motrin) and naproxen (Aleve). You take antacids, potassium pills or a salt substitute, or lithium. You have kidney disease. You take a diuretic. You are pregnant or breastfeeding or you plan to get pregnant. Side effects include:  Feeling lightheaded or dizzy. These are the most common side effects. Angiotensin receptor neprilysin inhibitor (ARNI)  Before you start to take an ARNI, talk to your doctor. Tell him or her if:  You take any anti-inflammatory medicines. These include ibuprofen (Advil, Motrin) and naproxen (Aleve). You take antacids, potassium pills or a salt substitute, or lithium. You take an ACE inhibitor or an ARB. You have kidney or liver problems. You take a diuretic. You are pregnant or breastfeeding or you plan to get pregnant. Side effects include:  Feeling lightheaded or dizzy. These are the most common side effects. Diuretics (water pills)  Before you start to take a diuretic, talk to your doctor. Tell him or her if:  You take lithium or anti-inflammatory medicines such as Advil or Aleve. Side effects include:  Urinating often.  Ask your doctor about timing these pills so that you don't have to use the bathroom at a bad time. Muscle cramps. This may mean that you are losing too much potassium. It is an important mineral. Call your doctor if you get muscle cramps. Tender breasts. This may occur in men who take spironolactone. Call your doctor if you get tender breasts that bother you. Beta-blockers  Before you start to take a beta-blocker, talk to your doctor. Tell him or her if:  You have asthma or diabetes. Side effects include:  Feeling dizzy or tired. This usually gets better with time. Digoxin  Before you start to take digoxin, talk to your doctor. Tell him or her if:  You have kidney disease. You take a diuretic or other medicines. This includes over-the-counter medicines. Side effects include:  Nausea or vomiting. Confusion, changes in vision, a racing or slowed heartbeat, or dizziness. Call your doctor right away if you have any of these side effects. Where can you learn more? Go to https://Pure Nootropics.Nulu. org and sign in to your [employer] account. Enter F132 in the [facility] box to learn more about \"Medicines for Heart Failure: Care Instructions. \"     If you do not have an account, please click on the \"Sign Up Now\" link. Current as of: January 10, 2022               Content Version: 13.4  © 9984-5195 [employer]. Care instructions adapted under license by [facility] ([facility]). If you have questions about a medical condition or this instruction, always ask your healthcare professional. [place] [address] any warranty or liability for your use of this information.

## 2022-09-27 NOTE — DISCHARGE INSTR - LAB
BMP AND BNP LABS WERE DRAWN TODAY   YOUR PROVIDER WILL REVIEW THIS VISIT, IF THERE ARE ANY CHANGES TO BE MADE THEIR OFFICE WILL CALL YOU, OTHERWISE CONTINUE YOUR CURRENT MEDICAL REGIMEN   KEEP HYDRATED  FOLLOW HEART FAILURE ZONES DAILY  Keep log of your blood pressures and bring to Dr Isabell Cat visit

## 2022-09-27 NOTE — PROGRESS NOTES
Congestive Heart Failure [address] [facility]   1975    Referring Provider: Dr. Isabell Cat  Primary Care Physician: Dr. Amy Arshad  Cardiologist: Dr. Isabell Cat  Sleep specialist: Dr Zenaida Conroy  (JAEL)    History of Present Illness:     Kristan Liao is a 55 y.o. male with a history of HFrEF, most recent EF 33-36% from HANNA 2-. 2    JARDIANCE  10mg daily ORDERED 9-22-22   HE START THIS MED Saturday    now maximized on GDMT  Oct 18th to see Dr Isabell Cat. No  pap use   JAEL, had testing needs dr to tell him what result was of sleep test.   No use of PRN diuretic. Weighs self most days of the week. Took meds 645am today. He will monitor his bp weights at home and bring log to Dr Isabell Cat visit. Patient Story:  He does not  have dyspnea with exertion, shortness of breath, or decline in overall functional capacity. He does not have  orthopnea. Sleeps on 1 pillow  as usual.   no PND, nocturnal cough or hemoptysis. He does not have abdominal distention or bloating, early satiety, anorexia/change in appetite. Clear to lemonade color Urine. Rare nocturia x 1. Has prn diuretic but not needed to ouse. Voids good, the same. He does  have  trace lower extremity edema bilaterally. He denies lightheadedness, dizziness. He denies palpitations, syncope or near syncope. He does not complain of chest pain, pressure, discomfort. No Known Allergies    Prior to Visit Medications    Medication Sig Taking?  Authorizing Provider   empagliflozin (JARDIANCE) 10 MG tablet Take 1 tablet by mouth daily  ÁNGELA Abel CNP   spironolactone (ALDACTONE) 25 MG tablet Take 1 tablet by mouth daily  ÁNGELA Abel CNP   atorvastatin (LIPITOR) 40 MG tablet take 1 tablet by mouth once daily  Mary Gordon MD   hydrALAZINE (APRESOLINE) 25 MG tablet take 1 tablet by mouth IN THE MORNING and at bedtime  Patient taking differently: 50 mg 3 times daily  Al ALKPHOS in the last 72 hours. Troponin: No results for input(s): TROPONINI in the last 72 hours. BNP: No results for input(s): BNP in the last 72 hours. Lipids: No results for input(s): CHOL, HDL in the last 72 hours. Invalid input(s): LDLCALCU  INR: No results for input(s): INR in the last 72 hours. WEIGHTS:    Wt Readings from Last 3 Encounters:   09/27/22 (!) 342 lb (155.1 kg)   09/20/22 (!) 345 lb (156.5 kg)   09/09/22 (!) 343 lb (155.6 kg)     TELEMETRY:  Cardiac Regularity: Regular  Cardiac Rhythm/Interpretation: SR    ASSESSMENT:  Matteo Gnozalez present for a follow up appointment after starting jardiance. Tolerating well. Upon assessment bilateral lungs are clear, abdomen soft and increased to 1+ edema BLE. Stands a lot, coaches football. And is . Denies any extra doses of lasix for s/s symptomatic CHF. Appointment with Dr. Calin Martinez on 10/18 for a repeat echocardiogram.          Interventions completed this visit:  IV diuretics given no   Lab work obtained no BNP/BMP   Reviewed currently prescribed medications with patient, educated on importance of compliance and answered any questions regarding their medication  Educated on signs and symptoms of HF  Educated on low sodium diet  Bp checked twice. 142/90 initial bp, repeat bp 137/95 after sitting 15min (took am meds 645am)    PLAN:  Scheduled to follow up in CHF clinic monthly and as needed    Labs reviewed:  stable. Continued HTN. Not on imdur currently. Keep log of bp and weights and bring to Dr Calin Martinez visit.     Shellie Gastelum APRN - JORDAN Charles, RN  Labs and CHF clinic note reviewed   On goal GDMT after recently starting Jardiance   Has follow up cardiology appointment in a few weeks   If BP remains elevated consider starting hydralazine/isordil   Follow up with CHF clinic as scheduled     Future Appointments   Date Time Provider Echo Dias   10/17/2022  7:30 AM [facility] CHF ROOM 1 [facility] CHF Miracle Larsen

## 2022-09-27 NOTE — PLAN OF CARE
Continue plan of care hf. HTN continues, he will monitor bp/weights at home and bring to cardiology f/u in Oct.  Conituned weight loss, although his BLE leg edema increased from trace to +1. Pending lab results. He has not used any prn diruetics.

## 2022-09-27 NOTE — RESULT ENCOUNTER NOTE
Labs and CHF clinic note reviewed  On goal GDMT after recently starting Jardiance  Has follow up cardiology appointment in a few weeks   If BP remains elevated consider starting hydralazine/isordil   Follow up with CHF clinic as scheduled    Thank you

## 2022-10-13 ENCOUNTER — TELEPHONE (OUTPATIENT)
Dept: SLEEP CENTER | Age: 47
End: 2022-10-13

## 2022-10-13 NOTE — TELEPHONE ENCOUNTER
Call to pt to reschedule 10-20-22 appt d/t Dr Ellis Dodge. Unable to [facility] phone rings busy.  Pt sent My Chart message

## 2022-10-17 ENCOUNTER — HOSPITAL ENCOUNTER (OUTPATIENT)
Dept: OTHER | Age: 47
Setting detail: THERAPIES SERIES
Discharge: HOME | End: 2022-10-17
Payer: COMMERCIAL

## 2022-10-17 VITALS
RESPIRATION RATE: 16 BRPM | DIASTOLIC BLOOD PRESSURE: 90 MMHG | BODY MASS INDEX: 47.84 KG/M2 | HEART RATE: 65 BPM | OXYGEN SATURATION: 95 % | SYSTOLIC BLOOD PRESSURE: 142 MMHG | WEIGHT: 315 LBS

## 2022-10-17 LAB
ANION GAP SERPL CALCULATED.3IONS-SCNC: 11 MMOL/L (ref 7–16)
BUN BLDV-MCNC: 16 MG/DL (ref 6–20)
CALCIUM SERPL-MCNC: 9.5 MG/DL (ref 8.6–10.2)
CHLORIDE BLD-SCNC: 102 MMOL/L (ref 98–107)
CO2: 26 MMOL/L (ref 22–29)
CREAT SERPL-MCNC: 1.3 MG/DL (ref 0.7–1.2)
GFR AFRICAN AMERICAN: >60
GFR NON-AFRICAN AMERICAN: >60 ML/MIN/1.73
GLUCOSE BLD-MCNC: 125 MG/DL (ref 74–99)
POTASSIUM SERPL-SCNC: 4 MMOL/L (ref 3.5–5)
PRO-BNP: 61 PG/ML (ref 0–125)
SODIUM BLD-SCNC: 139 MMOL/L (ref 132–146)

## 2022-10-17 PROCEDURE — 83880 ASSAY OF NATRIURETIC PEPTIDE: CPT

## 2022-10-17 PROCEDURE — 99214 OFFICE O/P EST MOD 30 MIN: CPT

## 2022-10-17 PROCEDURE — 36415 COLL VENOUS BLD VENIPUNCTURE: CPT

## 2022-10-17 PROCEDURE — 80048 BASIC METABOLIC PNL TOTAL CA: CPT

## 2022-10-17 NOTE — PROGRESS NOTES
Congestive Heart Failure [address] [place] [address]   1975    Referring Provider: Dr. Matt Shultz  Primary Care Physician: Dr. Denilson Vyas  Cardiologist: Dr. Matt Shultz  Sleep specialist: Dr Cynthia Goldberg  (JAEL)    History of Present Illness:  Keesha Nation is a 55 y.o. male with a history of HFrEF, most recent EF 33-36% from HANNA 2-. Patient Story:  He does not have dyspnea with exertion, shortness of breath, or decline in overall functional capacity. He does not have orthopnea, PND, nocturnal cough or hemoptysis. He does not have abdominal distention or bloating, early satiety, anorexia/change in appetite. Has prn diuretic but not needed to use. He does have trace lower extremity edema bilaterally. He denies lightheadedness, dizziness. He denies palpitations, syncope or near syncope. He does not complain of chest pain, pressure, discomfort. No Known Allergies    Prior to Visit Medications    Medication Sig Taking?  Authorizing Provider   empagliflozin (JARDIANCE) 10 MG tablet Take 1 tablet by mouth daily  ÁNGELA Foreman CNP   spironolactone (ALDACTONE) 25 MG tablet Take 1 tablet by mouth daily  ÁNGELA Foreman CNP   atorvastatin (LIPITOR) 40 MG tablet take 1 tablet by mouth once daily  Al Gordon MD   hydrALAZINE (APRESOLINE) 25 MG tablet take 1 tablet by mouth IN THE MORNING and at bedtime  Patient taking differently: 50 mg 3 times daily  Al Gordon MD   furosemide (LASIX) 40 MG tablet take 1 tablet by mouth once daily if needed IF 3 LBS WEIGHT GAIN IN 24 HOURS OR 5 LBS IN 5 DAYS  Patient not taking: No sig reported  Adelina Bernal DO   sacubitril-valsartan (ENTRESTO)  MG per tablet Take 1 tablet by mouth 2 times daily  Karoline Gordon MD   aspirin 81 MG chewable tablet Take 1 tablet by mouth daily  Kimberly Love MD   carvedilol (COREG) 25 MG tablet Take 1 tablet by mouth 2 times daily  [place] Worthington, MD     Guideline directed medical:  ARNI/ACE I/ARB: Yes Entresto  mg BID   Beta blocker:  Yes Coreg 25 mg BID  Aldosterone antagonist:  Yes Spirolactone 25mg daily   SGLT2i: Yes  Jardiance 10mg Daily    Physical Examination:     BP (!) 142/90   Pulse 65   Resp 16   Wt (!) 343 lb (155.6 kg)   SpO2 95%   BMI 47.84 kg/m²     Assessment  Charting Type: Shift assessment      HEENT (Head, Ears, Eyes, Nose, & Throat)  HEENT (WDL): Within Defined Limits    Respiratory  Respiratory Pattern: Regular  Respiratory Depth: Normal  Chest Assessment: Chest expansion symmetrical  L Breath Sounds: Clear  R Breath Sounds: Clear    Cardiac  Cardiac Regularity: Regular  Heart Sounds: S1, S2    Peripheral Vascular  Peripheral Vascular (WDL): Exceptions to WDL  Edema: Left lower extremity  RLE Edema: Trace  LLE Edema: Trace    Genitourinary  Genitourinary (WDL): Within Defined Limits    Psychosocial  Psychosocial (WDL): Within Defined Limits    LAB DATA:    Last 3 BMP      Sodium (mmol/L)   Date Value   10/17/2022 139   09/27/2022 137   09/20/2022 138     Potassium (mmol/L)   Date Value   10/17/2022 4.0   09/27/2022 3.6   09/20/2022 3.7     Potassium reflex Magnesium (mmol/L)   Date Value   03/01/2022 4.2     Chloride (mmol/L)   Date Value   10/17/2022 102   09/27/2022 99   09/20/2022 101     CO2 (mmol/L)   Date Value   10/17/2022 26   09/27/2022 26   09/20/2022 26     BUN (mg/dL)   Date Value   10/17/2022 16   09/27/2022 16   09/20/2022 11     Glucose (mg/dL)   Date Value   10/17/2022 125 (H)   09/27/2022 107 (H)   09/20/2022 130 (H)     Calcium (mg/dL)   Date Value   10/17/2022 9.5   09/27/2022 9.1   09/20/2022 8.9     Last 3 BNP       Pro-BNP (pg/mL)   Date Value   10/17/2022 61   09/27/2022 63   09/20/2022 79      CBC: No results for input(s): WBC, HGB, PLT in the last 72 hours.   BMP:    Recent Labs     10/17/22  0742      K 4.0      CO2 26   BUN 16   CREATININE 1.3*   GLUCOSE 125*         Hepatic: No results for input(s): AST, ALT, ALB, BILITOT, ALKPHOS in the last 72 hours. Troponin: No results for input(s): TROPONINI in the last 72 hours. BNP: No results for input(s): BNP in the last 72 hours. Lipids: No results for input(s): CHOL, HDL in the last 72 hours. Invalid input(s): LDLCALCU  INR: No results for input(s): INR in the last 72 hours. WEIGHTS:    Wt Readings from Last 3 Encounters:   10/17/22 (!) 343 lb (155.6 kg)   09/27/22 (!) 342 lb (155.1 kg)   09/20/22 (!) 345 lb (156.5 kg)     TELEMETRY:  Cardiac Regularity: Regular  Cardiac Rhythm/Interpretation: SR    ASSESSMENT:  Cecile Peck present for a follow up appointment with stable weights. Patient has PRN lasix for weight gain but per patient has not had to recently utilize. On assessment lungs are clear, abdomen is soft, trace edema present. Labs obtained. Patient will see Dr. Nathaly Choi in office tomorrow. Interventions completed this visit:  IV diuretics given : no   Lab work obtained : YES BNP/BMP   Reviewed currently prescribed medications with patient, educated on importance of compliance and answered any questions regarding their medication  Educated on signs and symptoms of HF  Educated on low sodium diet    PLAN:  Scheduled to follow up in CHF clinic monthly and as needed      Future Appointments   Date Time Provider Echo Dias   10/18/2022 12:15 PM Maral Allen MD [facility] [facility]   11/18/2022  7:30 AM [facility] CHF ROOM 1 [address]   12/2/2022  8:45 AM Ellie Sanchez DO [facility]       Given clinic phone number and aware of signs and symptoms to call with any HF change in symptoms.

## 2022-10-18 ENCOUNTER — OFFICE VISIT (OUTPATIENT)
Dept: CARDIOLOGY CLINIC | Age: 47
End: 2022-10-18
Payer: COMMERCIAL

## 2022-10-18 ENCOUNTER — TELEPHONE (OUTPATIENT)
Dept: CARDIOLOGY CLINIC | Age: 47
End: 2022-10-18

## 2022-10-18 VITALS
HEIGHT: 71 IN | WEIGHT: 315 LBS | SYSTOLIC BLOOD PRESSURE: 146 MMHG | RESPIRATION RATE: 18 BRPM | BODY MASS INDEX: 44.1 KG/M2 | DIASTOLIC BLOOD PRESSURE: 90 MMHG | HEART RATE: 61 BPM

## 2022-10-18 DIAGNOSIS — I10 PRIMARY HYPERTENSION: ICD-10-CM

## 2022-10-18 DIAGNOSIS — I42.8 NONISCHEMIC CARDIOMYOPATHY (HCC): ICD-10-CM

## 2022-10-18 DIAGNOSIS — G47.34 NOCTURNAL HYPOXIA: ICD-10-CM

## 2022-10-18 DIAGNOSIS — Z98.890 S/P CARDIAC CATH: ICD-10-CM

## 2022-10-18 DIAGNOSIS — Z71.82 EXERCISE COUNSELING: ICD-10-CM

## 2022-10-18 DIAGNOSIS — Z71.3 DIETARY COUNSELING AND SURVEILLANCE: ICD-10-CM

## 2022-10-18 DIAGNOSIS — I50.22 CHRONIC SYSTOLIC CONGESTIVE HEART FAILURE (HCC): ICD-10-CM

## 2022-10-18 DIAGNOSIS — G47.33 OSA (OBSTRUCTIVE SLEEP APNEA): ICD-10-CM

## 2022-10-18 DIAGNOSIS — E66.01 CLASS 3 SEVERE OBESITY DUE TO EXCESS CALORIES WITH SERIOUS COMORBIDITY AND BODY MASS INDEX (BMI) OF 50.0 TO 59.9 IN ADULT (HCC): ICD-10-CM

## 2022-10-18 DIAGNOSIS — I50.22 CHRONIC SYSTOLIC CONGESTIVE HEART FAILURE (HCC): Primary | ICD-10-CM

## 2022-10-18 LAB
ANION GAP SERPL CALCULATED.3IONS-SCNC: 13 MMOL/L (ref 7–16)
BUN BLDV-MCNC: 13 MG/DL (ref 6–20)
CALCIUM SERPL-MCNC: 10.3 MG/DL (ref 8.6–10.2)
CHLORIDE BLD-SCNC: 102 MMOL/L (ref 98–107)
CO2: 26 MMOL/L (ref 22–29)
CREAT SERPL-MCNC: 1.3 MG/DL (ref 0.7–1.2)
GFR SERPL CREATININE-BSD FRML MDRD: >60 ML/MIN/1.73
GLUCOSE BLD-MCNC: 111 MG/DL (ref 74–99)
POTASSIUM SERPL-SCNC: 4.1 MMOL/L (ref 3.5–5)
SODIUM BLD-SCNC: 141 MMOL/L (ref 132–146)

## 2022-10-18 PROCEDURE — G8427 DOCREV CUR MEDS BY ELIG CLIN: HCPCS | Performed by: INTERNAL MEDICINE

## 2022-10-18 PROCEDURE — G8417 CALC BMI ABV UP PARAM F/U: HCPCS | Performed by: INTERNAL MEDICINE

## 2022-10-18 PROCEDURE — G8484 FLU IMMUNIZE NO ADMIN: HCPCS | Performed by: INTERNAL MEDICINE

## 2022-10-18 PROCEDURE — 1036F TOBACCO NON-USER: CPT | Performed by: INTERNAL MEDICINE

## 2022-10-18 PROCEDURE — 93000 ELECTROCARDIOGRAM COMPLETE: CPT | Performed by: INTERNAL MEDICINE

## 2022-10-18 PROCEDURE — 99214 OFFICE O/P EST MOD 30 MIN: CPT | Performed by: INTERNAL MEDICINE

## 2022-10-18 RX ORDER — ISOSORBIDE MONONITRATE 30 MG/1
30 TABLET, EXTENDED RELEASE ORAL DAILY
Qty: 90 TABLET | Refills: 3 | Status: SHIPPED | OUTPATIENT
Start: 2022-10-18

## 2022-10-18 RX ORDER — HYDRALAZINE HYDROCHLORIDE 50 MG/1
50 TABLET, FILM COATED ORAL 3 TIMES DAILY
Qty: 90 TABLET | Refills: 3 | Status: SHIPPED | OUTPATIENT
Start: 2022-10-18

## 2022-10-18 RX ORDER — HYDRALAZINE HYDROCHLORIDE 50 MG/1
50 TABLET, FILM COATED ORAL 3 TIMES DAILY
Qty: 90 TABLET | Refills: 3 | Status: SHIPPED
Start: 2022-10-18 | End: 2022-10-18 | Stop reason: SDUPTHER

## 2022-10-18 NOTE — TELEPHONE ENCOUNTER
Patient seen in office today. ---- Message from Corin Trejo MD sent at 10/18/2022  7:22 AM EDT -----  Please Repeat BMP next week to see how kidney function is doing.

## 2022-10-18 NOTE — PROGRESS NOTES
Out Patient CARDIOLOGY FOLLOW UP    Name: Arabella Solis    Age: 55 y.o. Date of Service: 10/18/2022      Referring Physician: No admitting provider for patient encounter. Chief Complaint   Patient presents with    Congestive Heart Failure     Follow up         History of Present Illness: 68-year-old male with history of obesity, renal insufficiency, hypertension, and suspected sleep apnea who was hospitalized at [facility] in March 2022 for hypertensive urgency, dyspnea on exertion and fatigue with activities and underwent an ischemic evaluation with echocardiogram revealing depressed systolic function with echo on March 2022 revealing EF of 33 to 36%. He also underwent Lexiscan myocardial perfusion stress test that revealed fixed defects but no ischemia with an EF of 34%. Invasive cholangiogram was performed which revealed RCA 50% disease with ectatic proximal RCA and medical management was recommended. He has been on guideline directed medical therapy with carvedilol, Entresto, Jardiance, spironolactone as well as hydralazine. He present for follow-up visit today and reports no symptoms and reports still doing well. I have added Imdur to hydralazine since blood pressure is still not adequately controlled. I am going to obtain transthoracic echocardiogram to see if systolic function has improved and if systolic function has not improved patient will be referred to EP to discuss device therapy.            Review of Systems:   Cardiac: As per HPI  General: Denies fever or chills  Pulmonary: As per HPI  HEENT: Denies runny nose  GI: No complaints  : No complaints  Endocrine: Denies night sweats  Musculoskeletal: No complaints  Skin: Dry skin  Neuro: No complaints  Psych: Denies depression    Past Medical History:  Past Medical History:   Diagnosis Date    Acute combined systolic and diastolic congestive heart failure ([address] 03/01/2022    H/O cardiovascular stress test 03/02/2022 Nuclear Lexiscan Stress Test    Hypertension        Past Surgical History:  Past Surgical History:   Procedure Laterality Date    APPENDECTOMY      FOOT SURGERY      FRACTURE SURGERY         Family History:  Family History   Problem Relation Age of Onset    Stroke Mother     Hypertension Mother     Cancer Father        Social History:  Social History     Socioeconomic History    Marital status:      Spouse name: Not on file    Number of children: Not on file    Years of education: Not on file    Highest education level: Not on file   Occupational History    Not on file   Tobacco Use    Smoking status: Never    Smokeless tobacco: Never   Substance and Sexual Activity    Alcohol use: Never     Comment: socially    Drug use: Never    Sexual activity: Yes     Partners: Female   Other Topics Concern    Not on file   Social History Narrative    Not on file     Social Determinants of Health     Financial Resource Strain: Low Risk     Difficulty of Paying Living Expenses: Not hard at all   Food Insecurity: No Food Insecurity    Worried About Running Out of Food in the Last Year: Never true    [address] in the Last Year: Never true   Transportation Needs: No Transportation Needs    Lack of Transportation (Medical): No    Lack of Transportation (Non-Medical): No   Physical Activity: Not on file   Stress: Not on file   Social Connections: Not on file   Intimate Partner Violence: Not on file   Housing Stability: Unknown    Unable to Pay for Housing in the Last Year: No    Number of Places Lived in the Last Year: Not on file    Unstable Housing in the Last Year: No       Allergies:  No Known Allergies    Home Medications:  Prior to Admission medications    Medication Sig Start Date End Date Taking?  Authorizing Provider   hydrALAZINE (APRESOLINE) 50 MG tablet Take 1 tablet by mouth 3 times daily 10/18/22  Yes ÁNGELA Curry CNP   Multiple Vitamins-Minerals (MULTIVITAL PO) Take by mouth   Yes Historical Provider, MD   empagliflozin (JARDIANCE) 10 MG tablet Take 1 tablet by mouth daily 9/22/22  Yes ÁNGELA Lopez CNP   spironolactone (ALDACTONE) 25 MG tablet Take 1 tablet by mouth daily 9/16/22  Yes ÁNGELA Lopez CNP   atorvastatin (LIPITOR) 40 MG tablet take 1 tablet by mouth once daily 8/24/22  Yes Adriana Gordon MD   furosemide (LASIX) 40 MG tablet take 1 tablet by mouth once daily if needed IF 3 LBS WEIGHT GAIN IN 24 HOURS OR 5 LBS IN 5 DAYS 7/21/22  Yes Medardo Pablo DO   sacubitril-valsartan (ENTRESTO)  MG per tablet Take 1 tablet by mouth 2 times daily 7/11/22  Yes Adriana Gordon MD   aspirin 81 MG chewable tablet Take 1 tablet by mouth daily 3/4/22  Yes Carlo Simmons MD   carvedilol (COREG) 25 MG tablet Take 1 tablet by mouth 2 times daily 3/3/22  Yes Ancelmo Fontenot MD       Current Medications:  Current Outpatient Medications   Medication Sig Dispense Refill    hydrALAZINE (APRESOLINE) 50 MG tablet Take 1 tablet by mouth 3 times daily 90 tablet 3    Multiple Vitamins-Minerals (MULTIVITAL PO) Take by mouth      empagliflozin (JARDIANCE) 10 MG tablet Take 1 tablet by mouth daily 90 tablet 3    spironolactone (ALDACTONE) 25 MG tablet Take 1 tablet by mouth daily 90 tablet 1    atorvastatin (LIPITOR) 40 MG tablet take 1 tablet by mouth once daily 30 tablet 5    furosemide (LASIX) 40 MG tablet take 1 tablet by mouth once daily if needed IF 3 LBS WEIGHT GAIN IN 24 HOURS OR 5 LBS IN 5 DAYS 30 tablet 0    sacubitril-valsartan (ENTRESTO)  MG per tablet Take 1 tablet by mouth 2 times daily 180 tablet 3    aspirin 81 MG chewable tablet Take 1 tablet by mouth daily 30 tablet 3    carvedilol (COREG) 25 MG tablet Take 1 tablet by mouth 2 times daily 180 tablet 2     No current facility-administered medications for this visit.          Physical Exam:  BP (!) 146/90   Pulse 61   Resp 18   Ht 5' 11\" (1.803 m)   Wt (!) 340 lb 9.6 oz (154.5 kg)   BMI 47.50 kg/m²   Wt Readings from Last 3 Encounters:   10/18/22 (!) 340 lb 9.6 oz (154.5 kg)   10/17/22 (!) 343 lb (155.6 kg)   09/27/22 (!) 342 lb (155.1 kg)       Appearance: Alert and oriented x3 not in acute distress. Skin: Dry skin  Head: Atraumatic  Eyes: Intact extraocular muscles   ENMT: Mucous membranes are moist  Neck: Supple  Lungs: Clear to auscultation  Cardiac: Normal S1 and S2  Abdomen: Protuberant  Extremities: Intact range of motion  Neurologic: No focal neurological deficits  Peripheral Pulses: 2+ peripheral pulses    Intake/Output:  No intake or output data in the 24 hours ending 10/18/22 1246  @[employer]@    Laboratory Tests:  Recent Labs     10/17/22  0742      K 4.0      CO2 26   BUN 16   CREATININE 1.3*   GLUCOSE 125*   CALCIUM 9.5     Lab Results   Component Value Date/Time    MG 2.2 03/03/2022 06:28 AM    MG 2.0 03/02/2022 06:16 AM    MG 2.0 02/28/2022 09:11 PM     No results for input(s): ALKPHOS, ALT, AST, PROT, BILITOT, BILIDIR, LABALBU in the last 72 hours. No results for input(s): WBC, RBC, HGB, HCT, MCV, MCH, MCHC, RDW, PLT, MPV in the last 72 hours. No results found for: CKTOTAL, CKMB, CKMBINDEX, TROPONINI  No results for input(s): CKTOTAL, CKMB, CKMBINDEX, TROPHS in the last 72 hours.   Lab Results   Component Value Date    INR 1.2 02/28/2022    PROTIME 13.6 (H) 02/28/2022     Lab Results   Component Value Date    TSH 3.350 03/02/2022     Lab Results   Component Value Date    LABA1C 5.9 (H) 03/03/2022     No results found for: EAG  Lab Results   Component Value Date    CHOL 175 03/02/2022     Lab Results   Component Value Date    TRIG 101 03/02/2022     Lab Results   Component Value Date    HDL 37 03/02/2022     Lab Results   Component Value Date    LDLCALC 118 (H) 03/02/2022     Lab Results   Component Value Date    LABVLDL 20 03/02/2022     No results found for: [facility]  Recent Labs     10/17/22  0742   PROBNP 61       Cardiac Tests:  EKG reviewed (EKG date: Rhythm 61bpm, nonspecific interventricular conduction delay):        Echocardiogram reviewed: 3/2022    Summary   Moderate concentric left ventricular hypertrophy 1.6cm. Ejection fraction is visually estimated at 33 to 36%. Right ventricular not well seen but TAPSE of 2.4cm suggest normal RV   systolic function. Mildly thickened mitral valve leaflets. Mild mitral regurgitation is present. Individual aortic valve leaflets are not clearly visualized. No hemodynamically significant aortic stenosis is present. Mild tricuspid regurgitation. Stress test reviewed:  3/2022        1. The myocardial perfusion is abnormal.   2. Fixed defects involving the anterior, anterolateral, and inferior   walls suggestive of prior infarct. No reversible defects to suggest   ischemia. 3. Dilated left ventricle with moderately reduced LV systolic   function, EF 65%. 4. There is no transient ischemic dilation. 5. High risk myocardial perfusion study based on LV dysfunction. Cardiac catheterization reviewed: 3/2022       ANGIOGRAPHIC FINDINGS:  1. The left main coronary artery arises normally from the left sinus of  Valsalva. It is a large vessel without any disease. It bifurcates into  left anterior descending artery and left circumflex artery. 2.  The left anterior descending artery is a long vessel, extends down  to the apex. It is mildly diseased in the distal segment. It gives off  a couple of diagonal branches. The second tone is the mid size that has  50% mid segment disease. 3.  The left circumflex artery also is a large vessel, gives off two  large OM branches. The left circumflex artery is mildly tortuous  without any disease. 4.  The right coronary artery has anterior high takeoff. It is ectatic  in the proximal segment. It gives off an acute marginal branch and that  40% proximal segment disease. The vessel is mildly diffuse diseased. IMPRESSION:  1. Mild CAD involving second diagonal branch.   2.  Ectatic proximal RCA with 50% involving a large acute marginal  branch. RECOMMENDATION:  1. Continue current treatment. 2.  Optimize cardiomyopathy treatment. 3.  Blood pressure control. 4.  The patient will be observed for two hours and discharged home if he  meets discharge criteria. CXR reviewed: The 10-year ASCVD risk score (Navid LOPES, et al., 2019) is: 9.6%    Values used to calculate the score:      Age: 55 years      Sex: Male      Is Non- : Yes      Diabetic: No      Tobacco smoker: No      Systolic Blood Pressure: 338 mmHg      Is BP treated: Yes      HDL Cholesterol: 37 mg/dL      Total Cholesterol: 175 mg/dL    ASSESSMENT / PLAN:    1. Chronic systolic congestive heart failure (HCC)  Echocardiogram in March 2022 revealed EF of 33 to 36%  Patient is on guideline directed medical therapy with Entresto, beta-blocker, hydralazine and Imdur  LifeVest was declined  I am going to repeat echocardiogram to see if systolic function has improved and if not to be referred to EP to discuss device therapy  He is advised to avoid alcohol and tobacco and follow low-salt and low-fat diet as well as continuing with exercise and aerobic exercise  He is advised to proceed with sleep apnea evaluation to rule out sleep apnea  - Basic Metabolic Panel; Future    2. Coronary artery disease involving native coronary artery of native heart without angina pectoris  Cardiac catheterization in March 2022 revealed moderate coronary artery disease that has been medically managed as mentioned above  Continue on aspirin, statin, beta-blocker and Imdur  Currently denies any anginal symptoms  3.  Nonischemic cardiomyopathy ([address]  He has moderately depressed systolic function but the degree of obstructive coronary artery disease is not enough to suggest an ischemic cardiomyopathy and thus patient has nonischemic cardiomyopathy currently  If up titration of guideline directed medical therapy does not improve systolic function then we will discuss cardiac MRI for further evaluation and then refer patient to EP to discuss device therapy  - Basic Metabolic Panel; Future    4. Hypertension, unspecified type  He has long-term history of uncontrolled hypertension with subsequent LVH seen on recent echocardiogram  He was previously hospitalized for hypertensive urgency  Blood pressure is still not adequately controlled and subsequently Imdur was added       5. S/P cardiac cath  As mentioned above  6. Class 3 severe obesity due to excess calories with serious comorbidity and body mass index (BMI) of 50.0 to 59.9 in adult (HCC)  Weight loss was recommended  7. Dietary counseling and surveillance  Follow-up with dietitian  8. JAEL (obstructive sleep apnea)   Follow-up with your primary care doctor for sleep apnea management      9. Renal insufficiency  Monitor BUN/creatinine closely     10. LVH  Likely due to uncontrolled hypertension   We will continue to monitor blood pressure and optimize medication   If systolic function does not recover we will obtain cardiac MRI for nonischemic cardiomyopathy and further evaluate the LVH. FOLLOW-UP 3 month        Thank you for allowing me to participate in your patient's care. Please feel free to contact me if you have any questions or concerns.     Josefina Goetz MD  [phone]  Cardiology

## 2022-10-18 NOTE — PROGRESS NOTES
Patient Labs drawn form Right hand   Labs ordered by Dr. Marysol Kimball  Patient tolerated well.      Electronically signed by Ros Lucas MA on 10/18/2022 at 2:12 PM

## 2022-12-14 RX ORDER — CARVEDILOL 25 MG/1
TABLET ORAL
Qty: 180 TABLET | Refills: 2 | Status: SHIPPED | OUTPATIENT
Start: 2022-12-14

## 2023-02-13 ENCOUNTER — OFFICE VISIT (OUTPATIENT)
Dept: FAMILY MEDICINE CLINIC | Age: 48
End: 2023-02-13
Payer: COMMERCIAL

## 2023-02-13 VITALS
HEART RATE: 67 BPM | SYSTOLIC BLOOD PRESSURE: 124 MMHG | WEIGHT: 315 LBS | RESPIRATION RATE: 16 BRPM | DIASTOLIC BLOOD PRESSURE: 82 MMHG | HEIGHT: 71 IN | OXYGEN SATURATION: 95 % | BODY MASS INDEX: 44.1 KG/M2 | TEMPERATURE: 97.9 F

## 2023-02-13 DIAGNOSIS — I15.9 SECONDARY HYPERTENSION: ICD-10-CM

## 2023-02-13 DIAGNOSIS — Z79.899 ENCOUNTER FOR MONITORING STATIN THERAPY: ICD-10-CM

## 2023-02-13 DIAGNOSIS — I42.8 NONISCHEMIC CARDIOMYOPATHY (HCC): ICD-10-CM

## 2023-02-13 DIAGNOSIS — G47.33 OSA (OBSTRUCTIVE SLEEP APNEA): Primary | ICD-10-CM

## 2023-02-13 DIAGNOSIS — Z12.11 SCREEN FOR COLON CANCER: ICD-10-CM

## 2023-02-13 DIAGNOSIS — Z51.81 ENCOUNTER FOR MONITORING STATIN THERAPY: ICD-10-CM

## 2023-02-13 DIAGNOSIS — E78.5 DYSLIPIDEMIA: ICD-10-CM

## 2023-02-13 PROCEDURE — 99214 OFFICE O/P EST MOD 30 MIN: CPT | Performed by: SURGERY

## 2023-02-13 PROCEDURE — 1036F TOBACCO NON-USER: CPT | Performed by: SURGERY

## 2023-02-13 PROCEDURE — 90674 CCIIV4 VAC NO PRSV 0.5 ML IM: CPT | Performed by: SURGERY

## 2023-02-13 PROCEDURE — G8417 CALC BMI ABV UP PARAM F/U: HCPCS | Performed by: SURGERY

## 2023-02-13 PROCEDURE — 90471 IMMUNIZATION ADMIN: CPT | Performed by: SURGERY

## 2023-02-13 PROCEDURE — 3074F SYST BP LT 130 MM HG: CPT | Performed by: SURGERY

## 2023-02-13 PROCEDURE — 90472 IMMUNIZATION ADMIN EACH ADD: CPT | Performed by: SURGERY

## 2023-02-13 PROCEDURE — G8482 FLU IMMUNIZE ORDER/ADMIN: HCPCS | Performed by: SURGERY

## 2023-02-13 PROCEDURE — 90715 TDAP VACCINE 7 YRS/> IM: CPT | Performed by: SURGERY

## 2023-02-13 PROCEDURE — 3079F DIAST BP 80-89 MM HG: CPT | Performed by: SURGERY

## 2023-02-13 PROCEDURE — G8427 DOCREV CUR MEDS BY ELIG CLIN: HCPCS | Performed by: SURGERY

## 2023-02-13 ASSESSMENT — PATIENT HEALTH QUESTIONNAIRE - PHQ9
SUM OF ALL RESPONSES TO PHQ QUESTIONS 1-9: 0
SUM OF ALL RESPONSES TO PHQ9 QUESTIONS 1 & 2: 0
SUM OF ALL RESPONSES TO PHQ QUESTIONS 1-9: 0
2. FEELING DOWN, DEPRESSED OR HOPELESS: 0
1. LITTLE INTEREST OR PLEASURE IN DOING THINGS: 0
SUM OF ALL RESPONSES TO PHQ QUESTIONS 1-9: 0
SUM OF ALL RESPONSES TO PHQ QUESTIONS 1-9: 0

## 2023-02-13 NOTE — PATIENT INSTRUCTIONS
Call Dr. Erick Vela about getting apt. My Goal for Self-management of Heart Failure Includes 5 steps :     1. Notice a change in symptoms ( weight gain, short of breath, leg swelling, decreased activity level, bloating. ...)     2. Evaluate the change: (use the Heart Failure Zones )      3. Decide to take action: decide what your options are, such as: (call your doctor for an extra visit, take a prescribed medication, such as your water pill if your doctor has given you directions to do so, [address])     4. Come up with a strategy:  (now you call the doctor for advice / appointment. This is where you take action!!! Do not wait, catch the symptom early and treat it before it worsens. 5. Evaluate the response: The next day, check your Heart Failure Zones: are you in the GREEN ZONE (safe zone)? Worsening symptoms of YELLOW ZONE? Or have you moved to the RED ZONE and need to call 911 or go to the Emergency Room for evaluation? Call your doctor's office to update them on your symptoms of heart failure. Learning About Heart Failure Zones  What are heart failure zones? Heart failure zones give you an easy way to see changes in your heart failure symptoms. They also tell you when you need to get help. Check every day to see which zone you are in. Green zone. You are doing well. This is where you want to be. Your weight is stable. It's not going up or down. You breathe easily. You are sleeping well. You are able to lie flat without shortness of breath. You can do your usual activities. Yellow zone. Be careful. Your symptoms are changing. Call your doctor. You have new or increased shortness of breath. You are dizzy or lightheaded, or you feel like you may faint. You have sudden weight gain, such as more than 2 to 3 pounds in a day or 5 pounds in a week. (Your doctor may suggest a different range of weight gain.)  You have increased swelling in your legs, ankles, or feet.   You are so tired or weak that you can't do your usual activities. You are not sleeping well. Shortness of breath wakes you up at night. You need extra pillows. Red zone. 911  This is an emergency. Call . You have symptoms of sudden heart failure. For example: You have severe trouble breathing. You cough up pink, foamy mucus. You have a new irregular or fast heartbeat. You have symptoms of a heart attack. These may include:  Chest pain or pressure, or a strange feeling in the chest.  Sweating. Shortness of breath. Nausea or vomiting. Pain, pressure, or a strange feeling in the back, neck, jaw, or upper belly or in one or both shoulders or arms. Lightheadedness or sudden weakness. A fast or irregular heartbeat. If you have symptoms of a heart attack 911 : After you call , the  may tell you to chew 1 adult-strength or 2 to 4 low-dose aspirin. Wait for an ambulance. Do not try to drive yourself. Follow-up care is a key part of your treatment and safety. Be sure to make and go to all appointments, and call your doctor if you are having problems. It's also a good idea to know your test results and keep a list of the medicines you take. Where can you learn more? Go to https://Craft Dragon.Magento. org and sign in to your [employer] account. Enter T174 in the [facility] box to learn more about \"Learning About Heart Failure Zones. \"       Keep strict control of your fluid intake as well as sodium intake (1.5L or 64oz water/liquids [anything that can be easily converted to liquid like jello, pudding, ice cream counts as a liquid by volume], sodium less than 2gm per day). Fats limit to 60g per day (no more than 20g of saturated fats)  Cholesterol limit to no more than 300mg per day  Sodium less than 2000mg per day    MyViblioPal or similar application (Loose It application, www.noom. com, or track by journal) to monitor calories and macronutrients.   This is the most critical component to a heart healthy, balanced diet: honesty, keeping oneself accountable, ensure you are tracking daily goals and meeting them. Food is medicine!      -Carbohydrates limit to 40 to 50g per meal (120g to 150g per day)  -Fats limit to 60g per day (total fat intake should be 30% to 35% of your total daily calories, so restrict to whichever number is lower)   -Of the fats you do eat, never eat trans fats, never eat unsaturated fats, limit your saturated fat intake to less than 20g per day (or 7% of your total daily calories, so restrict to whichever number is lower)  -Cholesterol limit to no more than 300mg per day (200mg per day if you have elevated triglycerides or total cholesterol)  -Sodium less than 2000mg per day    MyPartenderPal or similar application (or track by journal) to monitor calories and macronutrients. This is the most critical component to a heart healthy, balanced diet: honesty, keeping oneself accountable, ensure you are tracking daily goals and meeting them. Food is medicine!

## 2023-02-13 NOTE — PROGRESS NOTES
Alfonzo Monge (:  1975) is a 52 y.o. male,Established patient, here for evaluation of the following chief complaint(s):  Follow-up and Health Maintenance (Due for Tdap, Colonoscopy, Covid Booster, Flu Vaccine)         ASSESSMENT/PLAN:  1. JAEL (obstructive sleep apnea)  -     [facility]  -     Sleep Study with PAP Titration; Future  -     CBC with Auto Differential; Future  -     Comprehensive Metabolic Panel; Future  2. Secondary hypertension  3. Nonischemic cardiomyopathy (HCC)  -     CBC with Auto Differential; Future  -     Comprehensive Metabolic Panel; Future  -     Brain Natriuretic Peptide; Future  4. Dyslipidemia  -     Comprehensive Metabolic Panel; Future  -     Lipid, Fasting; Future  5. Encounter for monitoring statin therapy  -     Lipid, Fasting; Future  6. Screen for colon cancer  -     Fecal DNA Colorectal cancer screening (Cologuard)      Reinforced HF concepts again today  Diet counseling with heart and renal function in mind  Lifestyle optimizing    Compensated from cardiovascular standpoint. No outward s/s HF. Needs to have another echo done and will appreciate those results from cards. Agreed (again) to get into sleep medicine specialist and have psg done. Need the diagnostic study to determine cpap vs bilevel. Otherwise stable and no changes to meds reviewed in hpi. No follow-ups on file. Subjective   SUBJECTIVE/OBJECTIVE:  HPI:    Nonischemic cardiomyopathy [facility])  -     October Cards visit:  \" I am going to obtain transthoracic echocardiogram to see if systolic function has improved and if systolic function has not improved patient will be referred to EP to discuss device therapy.     He is advised to avoid alcohol and tobacco and follow low-salt and low-fat diet as well as continuing with exercise and aerobic exercise  He is advised to proceed with sleep apnea evaluation to rule out sleep apnea\"    -     Still at HF clinic  -     They did change dose of entresto to max (June 30)  -     Last pro-BNP 61 in October of 2022  -       Lab Results   Component Value Date/Time     10/18/2022 01:03 PM    K 4.1 10/18/2022 01:03 PM    K 4.2 03/01/2022 06:34 AM     10/18/2022 01:03 PM    CO2 26 10/18/2022 01:03 PM    BUN 13 10/18/2022 01:03 PM    CREATININE 1.3 10/18/2022 01:03 PM    GLUCOSE 111 10/18/2022 01:03 PM    CALCIUM 10.3 10/18/2022 01:03 PM    GFR 60    -     No ischemic symptoms, no anginal symptoms.  -     Not weighing himself every day - he will get back in this routine (though he is following with CHF clinic)    -razia 25mg daily  -entresto 97-103mg twice daily  -coreg 25mg twice dialy  -imdur 30mg daily  -jardiance 10mg daily  -ASA 81mg      HLD  Lab Results   Component Value Date    CHOL 175 03/02/2022     Lab Results   Component Value Date    TRIG 101 03/02/2022     Lab Results   Component Value Date    HDL 37 03/02/2022     Lab Results   Component Value Date    LDLCALC 118 (H) 03/02/2022     Lab Results   Component Value Date    LABVLDL 20 03/02/2022     No results found for: CHOLHDLRATIO    Lipitor 40mg qhs  No untoward effect  Needs ASCVD recalculation  Will make adjustment accordingly with new labs      Acute combined systolic and diastolic congestive heart failure ([facility] [address]  -     Weighing self daily. Had great weight loss, has not had a day with 3# gain or more. And no 5 day period where he gained 5# or more.     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      JAEL  -something happened with scheduling between the patient Dr. Mortimer Laurel  -did not get psg done back in May (we ordered this as titration study for CPAP/BiPAP)  -reordered PAP titration today  -referral to [facility] again        Secondary hypertension  -     stable  -     back on to hydralazine 50mg TID  - Following with Dr. Maria Teresa Mccoy (Cardiology)  -      Preventative:  Health Maintenance   Topic Date Due    Colorectal Cancer Screen  Never done    COVID-19 Vaccine (4 - Booster for Pfizer series) 02/05/2022    Lipids  03/02/2023    A1C test (Diabetic or Prediabetic)  03/03/2023    Depression Screen  03/10/2023    DTaP/Tdap/Td vaccine (2 - Td or Tdap) 02/13/2033    Flu vaccine  Completed    Pneumococcal 0-64 years Vaccine  Completed    Hepatitis A vaccine  Aged Out    Hib vaccine  Aged Out    Meningococcal (ACWY) vaccine  Aged Out    Hepatitis C screen  Discontinued    HIV screen  Discontinued           ROS:    Denies 10pt ROS other than noted in HPI. Objective       PHYSICAL EXAM:    /82   Pulse 67   Temp 97.9 °F (36.6 °C) (Temporal)   Resp 16   Ht 5' 11\" (1.803 m)   Wt (!) 353 lb 3.2 oz (160.2 kg)   SpO2 95%   BMI 49.26 kg/m²     AVSS    GA: Well-groomed, appears well, no acute distress. HEENT: Atraumatic normocephalic. Extraocular muscles are grossly intact. Pupils are equal round reactive to light. Conjunctiva pink and moist.  Hearing is grossly intact. Nares patent without external drainage. Buccal mucosa pink and moist.  Posterior oropharynx clear without lesion or exudate. NECK: Trachea is midline, supple, nontender, no lymphadenopathy. CARDIO: Regular rate and rhythm without murmur rub or gallop. Cap refill 2+. Radial pulses 2+ bilaterally. No JVD. No hepatojugular reflux. trace peripheral edema. RESPIRATORY: Clear to auscultation bilaterally without wheezes rales or rhonchi. Normal inspiratory and expiratory effort. Normoxic on room air     ABD: obese, normoactive bowel sounds. Soft, nontender, no organomegaly. MSK: Structurally appropriate for age. No gross deficit. NEURO: Alert, no gross deficit.      PSYCH:  Mood is normal and congruent with affect. No signs of psychomotor retardation or agitation. Thought content seems normal, speech is fluent and non-pressured. SKIN: Generally warm pink and dry. Venous stasis bl le. An electronic signature was used to authenticate this note.     --Nadine Cruz, DO

## 2023-02-20 ENCOUNTER — TELEPHONE (OUTPATIENT)
Dept: SLEEP CENTER | Age: 48
End: 2023-02-20

## 2023-03-22 DIAGNOSIS — I50.22 CHRONIC SYSTOLIC CONGESTIVE HEART FAILURE (HCC): ICD-10-CM

## 2023-03-22 DIAGNOSIS — I42.8 NONISCHEMIC CARDIOMYOPATHY (HCC): ICD-10-CM

## 2023-03-22 DIAGNOSIS — G47.33 OSA (OBSTRUCTIVE SLEEP APNEA): Primary | ICD-10-CM

## 2023-03-22 DIAGNOSIS — Z79.899 NEW MEDICATION ADDED: ICD-10-CM

## 2023-03-22 DIAGNOSIS — I10 PRIMARY HYPERTENSION: ICD-10-CM

## 2023-03-22 DIAGNOSIS — G47.33 OSA (OBSTRUCTIVE SLEEP APNEA): ICD-10-CM

## 2023-03-23 RX ORDER — ATORVASTATIN CALCIUM 40 MG/1
TABLET, FILM COATED ORAL
Qty: 30 TABLET | Refills: 5 | Status: SHIPPED | OUTPATIENT
Start: 2023-03-23

## 2023-03-23 RX ORDER — HYDRALAZINE HYDROCHLORIDE 50 MG/1
TABLET, FILM COATED ORAL
Qty: 90 TABLET | Refills: 5 | Status: SHIPPED | OUTPATIENT
Start: 2023-03-23

## 2023-03-23 RX ORDER — SPIRONOLACTONE 25 MG/1
TABLET ORAL
Qty: 90 TABLET | Refills: 3 | Status: SHIPPED | OUTPATIENT
Start: 2023-03-23

## 2023-05-01 ENCOUNTER — HOSPITAL ENCOUNTER (OUTPATIENT)
Dept: SLEEP CENTER | Age: 48
Discharge: HOME | End: 2023-05-01
Payer: COMMERCIAL

## 2023-05-01 DIAGNOSIS — G47.33 OSA (OBSTRUCTIVE SLEEP APNEA): ICD-10-CM

## 2023-05-01 PROCEDURE — 95811 POLYSOM 6/>YRS CPAP 4/> PARM: CPT

## 2023-05-23 DIAGNOSIS — G47.33 OSA (OBSTRUCTIVE SLEEP APNEA): Primary | ICD-10-CM

## 2023-05-25 ENCOUNTER — TELEPHONE (OUTPATIENT)
Dept: SLEEP CENTER | Age: 48
End: 2023-05-25

## 2023-07-14 DIAGNOSIS — E78.5 DYSLIPIDEMIA: ICD-10-CM

## 2023-07-14 DIAGNOSIS — G47.33 OSA (OBSTRUCTIVE SLEEP APNEA): ICD-10-CM

## 2023-07-14 DIAGNOSIS — Z79.899 ENCOUNTER FOR MONITORING STATIN THERAPY: ICD-10-CM

## 2023-07-14 DIAGNOSIS — I42.8 NONISCHEMIC CARDIOMYOPATHY (HCC): ICD-10-CM

## 2023-07-14 DIAGNOSIS — Z51.81 ENCOUNTER FOR MONITORING STATIN THERAPY: ICD-10-CM

## 2023-07-14 LAB
ALBUMIN SERPL-MCNC: 4.5 G/DL (ref 3.5–5.2)
ALP SERPL-CCNC: 77 U/L (ref 40–129)
ALT SERPL-CCNC: 16 U/L (ref 0–40)
ANION GAP SERPL CALCULATED.3IONS-SCNC: 13 MMOL/L (ref 7–16)
AST SERPL-CCNC: 17 U/L (ref 0–39)
BASOPHILS # BLD: 0.03 E9/L (ref 0–0.2)
BASOPHILS NFR BLD: 0.9 % (ref 0–2)
BILIRUB SERPL-MCNC: 0.4 MG/DL (ref 0–1.2)
BNP BLD-MCNC: 36 PG/ML (ref 0–125)
BUN SERPL-MCNC: 17 MG/DL (ref 6–20)
CALCIUM SERPL-MCNC: 9.6 MG/DL (ref 8.6–10.2)
CHLORIDE SERPL-SCNC: 102 MMOL/L (ref 98–107)
CHOLESTEROL, FASTING: 145 MG/DL (ref 0–199)
CO2 SERPL-SCNC: 26 MMOL/L (ref 22–29)
CREAT SERPL-MCNC: 1.3 MG/DL (ref 0.7–1.2)
EOSINOPHIL # BLD: 0.21 E9/L (ref 0.05–0.5)
EOSINOPHIL NFR BLD: 6 % (ref 0–6)
ERYTHROCYTE [DISTWIDTH] IN BLOOD BY AUTOMATED COUNT: 14.5 FL (ref 11.5–15)
GLUCOSE SERPL-MCNC: 102 MG/DL (ref 74–99)
HCT VFR BLD AUTO: 52.2 % (ref 37–54)
HDLC SERPL-MCNC: 37 MG/DL
HGB BLD-MCNC: 16.8 G/DL (ref 12.5–16.5)
IMM GRANULOCYTES # BLD: 0.01 E9/L
IMM GRANULOCYTES NFR BLD: 0.3 % (ref 0–5)
LDL CHOLESTEROL CALCULATED: 91 MG/DL (ref 0–99)
LYMPHOCYTES # BLD: 1.33 E9/L (ref 1.5–4)
LYMPHOCYTES NFR BLD: 38 % (ref 20–42)
MCH RBC QN AUTO: 27.7 PG (ref 26–35)
MCHC RBC AUTO-ENTMCNC: 32.2 % (ref 32–34.5)
MCV RBC AUTO: 86 FL (ref 80–99.9)
MONOCYTES # BLD: 0.5 E9/L (ref 0.1–0.95)
MONOCYTES NFR BLD: 14.3 % (ref 2–12)
NEUTROPHILS # BLD: 1.42 E9/L (ref 1.8–7.3)
NEUTS SEG NFR BLD: 40.5 % (ref 43–80)
PLATELET # BLD AUTO: 209 E9/L (ref 130–450)
PMV BLD AUTO: 11.6 FL (ref 7–12)
POTASSIUM SERPL-SCNC: 4.7 MMOL/L (ref 3.5–5)
PROT SERPL-MCNC: 7.7 G/DL (ref 6.4–8.3)
RBC # BLD AUTO: 6.07 E12/L (ref 3.8–5.8)
SODIUM SERPL-SCNC: 141 MMOL/L (ref 132–146)
TRIGLYCERIDE, FASTING: 84 MG/DL (ref 0–149)
VLDLC SERPL CALC-MCNC: 17 MG/DL
WBC # BLD: 3.5 E9/L (ref 4.5–11.5)

## 2023-07-30 LAB — NONINV COLON CA DNA+OCC BLD SCRN STL QL: NEGATIVE

## 2023-07-31 RX ORDER — SACUBITRIL AND VALSARTAN 97; 103 MG/1; MG/1
TABLET, FILM COATED ORAL
Qty: 180 TABLET | Refills: 3 | Status: SHIPPED | OUTPATIENT
Start: 2023-07-31

## 2023-09-21 ENCOUNTER — TELEPHONE (OUTPATIENT)
Dept: NON INVASIVE DIAGNOSTICS | Age: 48
End: 2023-09-21

## 2023-09-21 NOTE — TELEPHONE ENCOUNTER
Routed soon to  echocardiogram from  to  to check on renewal of order or cancellation of test.  Awaiting on response.      Evon Smith RN, BSN

## 2023-09-26 DIAGNOSIS — I50.22 CHRONIC SYSTOLIC CONGESTIVE HEART FAILURE (HCC): ICD-10-CM

## 2023-09-26 DIAGNOSIS — Z79.899 NEW MEDICATION ADDED: ICD-10-CM

## 2023-09-28 RX ORDER — EMPAGLIFLOZIN 10 MG/1
TABLET, FILM COATED ORAL
Qty: 90 TABLET | Refills: 3 | Status: SHIPPED | OUTPATIENT
Start: 2023-09-28

## 2023-10-01 DIAGNOSIS — I50.22 CHRONIC SYSTOLIC CONGESTIVE HEART FAILURE (HCC): ICD-10-CM

## 2023-10-01 DIAGNOSIS — G47.33 OSA (OBSTRUCTIVE SLEEP APNEA): ICD-10-CM

## 2023-10-01 DIAGNOSIS — I10 PRIMARY HYPERTENSION: ICD-10-CM

## 2023-10-01 DIAGNOSIS — I42.8 NONISCHEMIC CARDIOMYOPATHY (HCC): ICD-10-CM

## 2023-10-02 RX ORDER — HYDRALAZINE HYDROCHLORIDE 50 MG/1
TABLET, FILM COATED ORAL
Qty: 90 TABLET | Refills: 5 | Status: SHIPPED | OUTPATIENT
Start: 2023-10-02

## 2023-10-02 RX ORDER — ATORVASTATIN CALCIUM 40 MG/1
TABLET, FILM COATED ORAL
Qty: 30 TABLET | Refills: 5 | Status: SHIPPED | OUTPATIENT
Start: 2023-10-02

## 2023-10-05 RX ORDER — CARVEDILOL 25 MG/1
TABLET ORAL
Qty: 180 TABLET | Refills: 2 | Status: SHIPPED | OUTPATIENT
Start: 2023-10-05

## 2023-11-02 DIAGNOSIS — I42.8 NONISCHEMIC CARDIOMYOPATHY (HCC): ICD-10-CM

## 2023-11-02 DIAGNOSIS — G47.33 OSA (OBSTRUCTIVE SLEEP APNEA): ICD-10-CM

## 2023-11-02 DIAGNOSIS — I50.22 CHRONIC SYSTOLIC CONGESTIVE HEART FAILURE (HCC): ICD-10-CM

## 2023-11-02 DIAGNOSIS — I10 PRIMARY HYPERTENSION: ICD-10-CM

## 2023-11-02 RX ORDER — ISOSORBIDE MONONITRATE 30 MG/1
30 TABLET, EXTENDED RELEASE ORAL DAILY
Qty: 90 TABLET | Refills: 3 | Status: SHIPPED | OUTPATIENT
Start: 2023-11-02

## 2023-12-06 ENCOUNTER — OFFICE VISIT (OUTPATIENT)
Dept: CARDIOLOGY CLINIC | Age: 48
End: 2023-12-06

## 2023-12-06 VITALS
HEIGHT: 70 IN | HEART RATE: 68 BPM | RESPIRATION RATE: 16 BRPM | WEIGHT: 315 LBS | SYSTOLIC BLOOD PRESSURE: 112 MMHG | BODY MASS INDEX: 45.1 KG/M2 | DIASTOLIC BLOOD PRESSURE: 70 MMHG

## 2023-12-06 DIAGNOSIS — G47.34 NOCTURNAL HYPOXIA: ICD-10-CM

## 2023-12-06 DIAGNOSIS — Z71.82 EXERCISE COUNSELING: ICD-10-CM

## 2023-12-06 DIAGNOSIS — I42.8 NONISCHEMIC CARDIOMYOPATHY (HCC): ICD-10-CM

## 2023-12-06 DIAGNOSIS — Z79.899 NEW MEDICATION ADDED: ICD-10-CM

## 2023-12-06 DIAGNOSIS — I50.22 CHRONIC SYSTOLIC CONGESTIVE HEART FAILURE (HCC): ICD-10-CM

## 2023-12-06 DIAGNOSIS — Z98.890 S/P CARDIAC CATH: ICD-10-CM

## 2023-12-06 DIAGNOSIS — E66.01 CLASS 3 SEVERE OBESITY DUE TO EXCESS CALORIES WITH SERIOUS COMORBIDITY AND BODY MASS INDEX (BMI) OF 50.0 TO 59.9 IN ADULT (HCC): ICD-10-CM

## 2023-12-06 DIAGNOSIS — I50.41 ACUTE COMBINED SYSTOLIC AND DIASTOLIC CONGESTIVE HEART FAILURE (HCC): Primary | ICD-10-CM

## 2023-12-06 DIAGNOSIS — I10 PRIMARY HYPERTENSION: ICD-10-CM

## 2023-12-06 DIAGNOSIS — I16.0 HYPERTENSIVE URGENCY: ICD-10-CM

## 2023-12-06 DIAGNOSIS — G47.33 OSA (OBSTRUCTIVE SLEEP APNEA): ICD-10-CM

## 2023-12-06 DIAGNOSIS — Z71.3 DIETARY COUNSELING AND SURVEILLANCE: ICD-10-CM

## 2023-12-06 RX ORDER — SPIRONOLACTONE 25 MG/1
12.5 TABLET ORAL DAILY
Qty: 90 TABLET | Refills: 3 | Status: SHIPPED | OUTPATIENT
Start: 2023-12-06

## 2023-12-06 NOTE — PROGRESS NOTES
to next visit  LifeVest was declined  I am going to repeat echocardiogram to see if systolic function has improved and if not to be referred to EP to discuss device therapy  He is advised to avoid alcohol and tobacco and follow low-salt and low-fat diet as well as continuing with exercise and aerobic exercise  Is advised on the crucial need of sleep apnea treatment  - Basic Metabolic Panel; Future    2. Coronary artery disease involving native coronary artery of native heart without angina pectoris  Cardiac catheterization in March 2022 revealed moderate coronary artery disease that has been medically managed as mentioned above  Continue on aspirin, statin, beta-blocker and Imdur  Currently denies any anginal symptoms  3. Nonischemic cardiomyopathy ([address])  He has moderately depressed systolic function but the degree of obstructive coronary artery disease is not enough to suggest an ischemic cardiomyopathy and thus patient has nonischemic cardiomyopathy currently  If up titration of guideline directed medical therapy does not improve systolic function then we will discuss cardiac MRI for further evaluation and then refer patient to EP to discuss device therapy  - Basic Metabolic Panel; Future    4. Hypertension, unspecified type  Blood pressure is stable      5. S/P cardiac cath  As mentioned above  6. Class 3 severe obesity due to excess calories with serious comorbidity and body mass index (BMI) of 50.0 to 59.9 in [facility])  Weight loss was recommended  Referring the patient to bariatric practice for further evaluation and management  7. Dietary counseling and surveillance  Follow-up with dietitian  8. JAEL (obstructive sleep apnea)  Advised to compliant with CPAP      9. Renal insufficiency  Monitor BUN/creatinine closely     10.   LVH  Likely due to uncontrolled hypertension   We will continue to monitor blood pressure and optimize medication   If systolic function does not recover we will obtain cardiac MRI for

## 2023-12-08 PROBLEM — I50.22 CHRONIC SYSTOLIC CONGESTIVE HEART FAILURE (HCC): Status: ACTIVE | Noted: 2023-12-08

## 2023-12-08 PROBLEM — Z79.899 NEW MEDICATION ADDED: Status: ACTIVE | Noted: 2023-12-08

## 2023-12-13 DIAGNOSIS — Z79.899 NEW MEDICATION ADDED: ICD-10-CM

## 2023-12-13 DIAGNOSIS — I42.8 NONISCHEMIC CARDIOMYOPATHY (HCC): ICD-10-CM

## 2023-12-13 DIAGNOSIS — I50.41 ACUTE COMBINED SYSTOLIC AND DIASTOLIC CONGESTIVE HEART FAILURE (HCC): ICD-10-CM

## 2023-12-13 DIAGNOSIS — I50.22 CHRONIC SYSTOLIC CONGESTIVE HEART FAILURE (HCC): ICD-10-CM

## 2023-12-13 DIAGNOSIS — I16.0 HYPERTENSIVE URGENCY: ICD-10-CM

## 2023-12-13 LAB
ANION GAP SERPL CALCULATED.3IONS-SCNC: 11 MMOL/L (ref 7–16)
BUN BLDV-MCNC: 11 MG/DL (ref 6–20)
CALCIUM SERPL-MCNC: 9.3 MG/DL (ref 8.6–10.2)
CHLORIDE BLD-SCNC: 101 MMOL/L (ref 98–107)
CO2: 27 MMOL/L (ref 22–29)
CREAT SERPL-MCNC: 1.2 MG/DL (ref 0.7–1.2)
GFR SERPL CREATININE-BSD FRML MDRD: >60 ML/MIN/1.73M2
GLUCOSE BLD-MCNC: 98 MG/DL (ref 74–99)
POTASSIUM SERPL-SCNC: 4.4 MMOL/L (ref 3.5–5)
SODIUM BLD-SCNC: 139 MMOL/L (ref 132–146)

## 2024-04-19 DIAGNOSIS — I10 PRIMARY HYPERTENSION: ICD-10-CM

## 2024-04-19 DIAGNOSIS — G47.33 OSA (OBSTRUCTIVE SLEEP APNEA): ICD-10-CM

## 2024-04-19 DIAGNOSIS — I42.8 NONISCHEMIC CARDIOMYOPATHY (HCC): ICD-10-CM

## 2024-04-19 DIAGNOSIS — I50.22 CHRONIC SYSTOLIC CONGESTIVE HEART FAILURE (HCC): ICD-10-CM

## 2024-04-19 RX ORDER — ATORVASTATIN CALCIUM 40 MG/1
TABLET, FILM COATED ORAL
Qty: 30 TABLET | Refills: 11 | Status: SHIPPED | OUTPATIENT
Start: 2024-04-19

## 2024-04-19 RX ORDER — HYDRALAZINE HYDROCHLORIDE 50 MG/1
TABLET, FILM COATED ORAL
Qty: 90 TABLET | Refills: 11 | Status: SHIPPED | OUTPATIENT
Start: 2024-04-19

## 2024-05-21 ENCOUNTER — HOSPITAL ENCOUNTER (OUTPATIENT)
Age: 49
Discharge: HOME | End: 2024-05-23
Payer: COMMERCIAL

## 2024-05-21 VITALS
HEIGHT: 70 IN | DIASTOLIC BLOOD PRESSURE: 70 MMHG | SYSTOLIC BLOOD PRESSURE: 112 MMHG | BODY MASS INDEX: 45.1 KG/M2 | WEIGHT: 315 LBS

## 2024-05-21 DIAGNOSIS — I50.41 ACUTE COMBINED SYSTOLIC AND DIASTOLIC CONGESTIVE HEART FAILURE (HCC): ICD-10-CM

## 2024-05-21 DIAGNOSIS — Z79.899 NEW MEDICATION ADDED: ICD-10-CM

## 2024-05-21 DIAGNOSIS — I42.8 NONISCHEMIC CARDIOMYOPATHY (HCC): ICD-10-CM

## 2024-05-21 DIAGNOSIS — I50.22 CHRONIC SYSTOLIC CONGESTIVE HEART FAILURE (HCC): ICD-10-CM

## 2024-05-21 DIAGNOSIS — I16.0 HYPERTENSIVE URGENCY: ICD-10-CM

## 2024-05-21 PROCEDURE — 93306 TTE W/DOPPLER COMPLETE: CPT

## 2024-05-21 PROCEDURE — 93306 TTE W/DOPPLER COMPLETE: CPT | Performed by: INTERNAL MEDICINE

## 2024-05-23 LAB
ECHO AV AREA PEAK VELOCITY: 4.1 CM2
ECHO AV AREA PEAK VELOCITY: 4.1 CM2
ECHO AV AREA PEAK VELOCITY: 4.7 CM2
ECHO AV AREA PEAK VELOCITY: 4.7 CM2
ECHO AV AREA VTI: 4.4 CM2
ECHO AV AREA/BSA VTI: 1.7 CM2/M2
ECHO AV CUSP MM: 2.7 CM
ECHO AV MEAN GRADIENT: 6 MMHG
ECHO AV MEAN VELOCITY: 1.1 M/S
ECHO AV PEAK GRADIENT: 11 MMHG
ECHO AV PEAK GRADIENT: 9 MMHG
ECHO AV PEAK VELOCITY: 1.5 M/S
ECHO AV PEAK VELOCITY: 1.5 M/S
ECHO AV VTI: 31.9 CM
ECHO BSA: 2.75 M2
ECHO EST RA PRESSURE: 3 MMHG
ECHO LA DIAMETER INDEX: 1.53 CM/M2
ECHO LA DIAMETER: 4 CM
ECHO LA VOL A-L A2C: 101 ML (ref 18–58)
ECHO LA VOL A-L A4C: 105 ML (ref 18–58)
ECHO LA VOL BP: 111 ML (ref 18–58)
ECHO LA VOL MOD A2C: 98 ML (ref 18–58)
ECHO LA VOL MOD A4C: 100 ML (ref 18–58)
ECHO LA VOL/BSA BIPLANE: 43 ML/M2 (ref 16–34)
ECHO LA VOLUME AREA LENGTH: 115 ML
ECHO LA VOLUME INDEX A-L A2C: 39 ML/M2 (ref 16–34)
ECHO LA VOLUME INDEX A-L A4C: 40 ML/M2 (ref 16–34)
ECHO LA VOLUME INDEX AREA LENGTH: 44 ML/M2 (ref 16–34)
ECHO LA VOLUME INDEX MOD A2C: 38 ML/M2 (ref 16–34)
ECHO LA VOLUME INDEX MOD A4C: 38 ML/M2 (ref 16–34)
ECHO LV FRACTIONAL SHORTENING: 38 % (ref 28–44)
ECHO LV INTERNAL DIMENSION DIASTOLE INDEX: 2.11 CM/M2
ECHO LV INTERNAL DIMENSION DIASTOLIC: 5.5 CM (ref 4.2–5.9)
ECHO LV INTERNAL DIMENSION SYSTOLIC INDEX: 1.3 CM/M2
ECHO LV INTERNAL DIMENSION SYSTOLIC: 3.4 CM
ECHO LV IVSD: 1.5 CM (ref 0.6–1)
ECHO LV IVSS: 2.2 CM
ECHO LV MASS 2D: 373.1 G (ref 88–224)
ECHO LV MASS INDEX 2D: 143 G/M2 (ref 49–115)
ECHO LV POSTERIOR WALL DIASTOLIC: 1.5 CM (ref 0.6–1)
ECHO LV POSTERIOR WALL SYSTOLIC: 2.2 CM
ECHO LV RELATIVE WALL THICKNESS RATIO: 0.55
ECHO LVOT AREA: 5.3 CM2
ECHO LVOT AV VTI INDEX: 0.84
ECHO LVOT DIAM: 2.6 CM
ECHO LVOT MEAN GRADIENT: 5 MMHG
ECHO LVOT PEAK GRADIENT: 6 MMHG
ECHO LVOT PEAK GRADIENT: 8 MMHG
ECHO LVOT PEAK VELOCITY: 1.2 M/S
ECHO LVOT PEAK VELOCITY: 1.4 M/S
ECHO LVOT STROKE VOLUME INDEX: 54.7 ML/M2
ECHO LVOT SV: 142.7 ML
ECHO LVOT VTI: 26.9 CM
ECHO MV "A" WAVE DURATION: 243.6 MSEC
ECHO MV A VELOCITY: 0.74 M/S
ECHO MV AREA PHT: 3.7 CM2
ECHO MV AREA VTI: 4.8 CM2
ECHO MV E DECELERATION TIME (DT): 251.5 MS
ECHO MV E VELOCITY: 0.69 M/S
ECHO MV E/A RATIO: 0.93
ECHO MV LVOT VTI INDEX: 1.12
ECHO MV MAX VELOCITY: 0.8 M/S
ECHO MV MEAN GRADIENT: 1 MMHG
ECHO MV MEAN VELOCITY: 0.4 M/S
ECHO MV PEAK GRADIENT: 3 MMHG
ECHO MV PRESSURE HALF TIME (PHT): 59 MS
ECHO MV VTI: 30 CM
ECHO PV MAX VELOCITY: 1.1 M/S
ECHO PV MEAN GRADIENT: 2 MMHG
ECHO PV MEAN VELOCITY: 0.7 M/S
ECHO PV PEAK GRADIENT: 5 MMHG
ECHO PV VTI: 23.1 CM
ECHO PVEIN A DURATION: 144.6 MS
ECHO PVEIN A VELOCITY: 0.2 M/S
ECHO PVEIN PEAK D VELOCITY: 0.3 M/S
ECHO PVEIN PEAK S VELOCITY: 0.5 M/S
ECHO PVEIN S/D RATIO: 1.7
ECHO RIGHT VENTRICULAR SYSTOLIC PRESSURE (RVSP): 11 MMHG
ECHO RV INTERNAL DIMENSION: 4.2 CM
ECHO RV LONGITUDINAL DIMENSION: 8 CM
ECHO RV MID DIMENSION: 3.8 CM
ECHO RVOT MEAN GRADIENT: 2 MMHG
ECHO RVOT PEAK GRADIENT: 3 MMHG
ECHO RVOT PEAK VELOCITY: 0.8 M/S
ECHO RVOT VTI: 19 CM
ECHO TV REGURGITANT MAX VELOCITY: 1.37 M/S
ECHO TV REGURGITANT PEAK GRADIENT: 8 MMHG

## 2024-05-24 ENCOUNTER — TELEPHONE (OUTPATIENT)
Dept: CARDIOLOGY CLINIC | Age: 49
End: 2024-05-24

## 2024-05-24 NOTE — TELEPHONE ENCOUNTER
Patient notified of echo results per Dr. Gordon and notified the importance of keeping good blood pressure per Dr. Gordon patient will follow up as scheduled.

## 2024-05-24 NOTE — TELEPHONE ENCOUNTER
----- Message from Al Gordon MD sent at 5/24/2024  6:25 AM EDT -----  Heart is pumping fine.  Heart function has improved.  There is a moderate left ventricular thickness for which good blood pressure control is highly recommended.  Details will be discussed during follow-up visit.

## 2024-08-02 RX ORDER — CARVEDILOL 25 MG/1
TABLET ORAL
Qty: 180 TABLET | Refills: 2 | Status: SHIPPED | OUTPATIENT
Start: 2024-08-02

## 2024-08-02 RX ORDER — SACUBITRIL AND VALSARTAN 97; 103 MG/1; MG/1
TABLET, FILM COATED ORAL
Qty: 180 TABLET | Refills: 3 | Status: SHIPPED | OUTPATIENT
Start: 2024-08-02

## 2024-12-05 DIAGNOSIS — I10 PRIMARY HYPERTENSION: ICD-10-CM

## 2024-12-05 DIAGNOSIS — G47.33 OSA (OBSTRUCTIVE SLEEP APNEA): ICD-10-CM

## 2024-12-05 DIAGNOSIS — Z79.899 NEW MEDICATION ADDED: ICD-10-CM

## 2024-12-05 DIAGNOSIS — I42.8 NONISCHEMIC CARDIOMYOPATHY (HCC): ICD-10-CM

## 2024-12-05 DIAGNOSIS — I50.22 CHRONIC SYSTOLIC CONGESTIVE HEART FAILURE (HCC): ICD-10-CM

## 2024-12-06 RX ORDER — ISOSORBIDE MONONITRATE 30 MG/1
30 TABLET, EXTENDED RELEASE ORAL DAILY
Qty: 90 TABLET | Refills: 3 | Status: SHIPPED | OUTPATIENT
Start: 2024-12-06

## 2024-12-06 RX ORDER — EMPAGLIFLOZIN 10 MG/1
10 TABLET, FILM COATED ORAL DAILY
Qty: 90 TABLET | Refills: 3 | Status: SHIPPED | OUTPATIENT
Start: 2024-12-06

## 2024-12-12 RX ORDER — CARVEDILOL 25 MG/1
25 TABLET ORAL 2 TIMES DAILY
Qty: 180 TABLET | Refills: 2 | Status: SHIPPED | OUTPATIENT
Start: 2024-12-12

## 2025-04-01 ENCOUNTER — OFFICE VISIT (OUTPATIENT)
Dept: CARDIOLOGY CLINIC | Age: 50
End: 2025-04-01
Payer: COMMERCIAL

## 2025-04-01 VITALS
HEIGHT: 71 IN | BODY MASS INDEX: 44.1 KG/M2 | DIASTOLIC BLOOD PRESSURE: 74 MMHG | OXYGEN SATURATION: 97 % | WEIGHT: 315 LBS | SYSTOLIC BLOOD PRESSURE: 136 MMHG | TEMPERATURE: 97.5 F | HEART RATE: 70 BPM | RESPIRATION RATE: 18 BRPM

## 2025-04-01 DIAGNOSIS — G47.33 OSA (OBSTRUCTIVE SLEEP APNEA): ICD-10-CM

## 2025-04-01 DIAGNOSIS — I50.22 CHRONIC SYSTOLIC CONGESTIVE HEART FAILURE (HCC): ICD-10-CM

## 2025-04-01 DIAGNOSIS — E66.01 CLASS 3 SEVERE OBESITY DUE TO EXCESS CALORIES WITH SERIOUS COMORBIDITY AND BODY MASS INDEX (BMI) OF 50.0 TO 59.9 IN ADULT: ICD-10-CM

## 2025-04-01 DIAGNOSIS — Z71.82 EXERCISE COUNSELING: ICD-10-CM

## 2025-04-01 DIAGNOSIS — Z98.890 S/P CARDIAC CATH: ICD-10-CM

## 2025-04-01 DIAGNOSIS — I50.41 ACUTE COMBINED SYSTOLIC AND DIASTOLIC CONGESTIVE HEART FAILURE (HCC): ICD-10-CM

## 2025-04-01 DIAGNOSIS — E66.813 CLASS 3 SEVERE OBESITY DUE TO EXCESS CALORIES WITH SERIOUS COMORBIDITY AND BODY MASS INDEX (BMI) OF 50.0 TO 59.9 IN ADULT: ICD-10-CM

## 2025-04-01 DIAGNOSIS — I16.0 HYPERTENSIVE URGENCY: ICD-10-CM

## 2025-04-01 DIAGNOSIS — I10 HYPERTENSION, UNSPECIFIED TYPE: ICD-10-CM

## 2025-04-01 DIAGNOSIS — I42.8 NONISCHEMIC CARDIOMYOPATHY (HCC): Primary | ICD-10-CM

## 2025-04-01 DIAGNOSIS — Z79.899 NEW MEDICATION ADDED: ICD-10-CM

## 2025-04-01 PROCEDURE — 1036F TOBACCO NON-USER: CPT | Performed by: INTERNAL MEDICINE

## 2025-04-01 PROCEDURE — G8417 CALC BMI ABV UP PARAM F/U: HCPCS | Performed by: INTERNAL MEDICINE

## 2025-04-01 PROCEDURE — G8427 DOCREV CUR MEDS BY ELIG CLIN: HCPCS | Performed by: INTERNAL MEDICINE

## 2025-04-01 PROCEDURE — 93000 ELECTROCARDIOGRAM COMPLETE: CPT | Performed by: INTERNAL MEDICINE

## 2025-04-01 PROCEDURE — 3075F SYST BP GE 130 - 139MM HG: CPT | Performed by: INTERNAL MEDICINE

## 2025-04-01 PROCEDURE — 99214 OFFICE O/P EST MOD 30 MIN: CPT | Performed by: INTERNAL MEDICINE

## 2025-04-01 PROCEDURE — 3078F DIAST BP <80 MM HG: CPT | Performed by: INTERNAL MEDICINE

## 2025-04-01 NOTE — PROGRESS NOTES
spironolactone (ALDACTONE) 25 MG tablet Take 0.5 tablets by mouth daily (Patient not taking: Reported on 4/1/2025) 90 tablet 3     Current Facility-Administered Medications   Medication Dose Route Frequency Provider Last Rate Last Admin    perflutren lipid microspheres (DEFINITY) injection 1.5 mL  1.5 mL IntraVENous ONCE PRN Al Gordon MD        perflutren lipid microspheres (DEFINITY) injection 1.65 mg  1.5 mL IntraVENous ONCE PRN Al Gordon MD             Physical Exam:  /74   Pulse 70   Temp 97.5 °F (36.4 °C)   Resp 18   Ht 1.803 m (5' 11\")   Wt (!) 159.1 kg (350 lb 12.8 oz)   SpO2 97%   BMI 48.93 kg/m²   Wt Readings from Last 3 Encounters:   04/01/25 (!) 159.1 kg (350 lb 12.8 oz)   05/21/24 (!) 152.9 kg (337 lb)   12/06/23 (!) 153.1 kg (337 lb 9.6 oz)       Appearance: Alert and oriented x3 not in acute distress.  Skin: Dry skin  Head: Atraumatic  Eyes: Intact extraocular muscles   ENMT: Mucous membranes are moist  Neck: Supple  Lungs: Clear to auscultation  Cardiac: Normal S1 and S2  Abdomen: Protuberant  Extremities: Intact range of motion  Neurologic: No focal neurological deficits  Peripheral Pulses: 2+ peripheral pulses    Intake/Output:  No intake or output data in the 24 hours ending 04/01/25 0828  [unfilled]    Laboratory Tests:  No results for input(s): \"NA\", \"K\", \"CL\", \"CO2\", \"BUN\", \"CREATININE\", \"GLUCOSE\", \"CALCIUM\" in the last 72 hours.    Lab Results   Component Value Date/Time    MG 2.2 03/03/2022 06:28 AM    MG 2.0 03/02/2022 06:16 AM    MG 2.0 02/28/2022 09:11 PM     No results for input(s): \"ALKPHOS\", \"ALT\", \"AST\", \"BILITOT\", \"BILIDIR\", \"LABALBU\" in the last 72 hours.    Invalid input(s): \"PROT\"  No results for input(s): \"WBC\", \"RBC\", \"HGB\", \"HCT\", \"MCV\", \"MCH\", \"MCHC\", \"RDW\", \"PLT\", \"MPV\" in the last 72 hours.  No results found for: \"CKTOTAL\", \"CKMB\", \"CKMBINDEX\", \"TROPONINI\"  No results for input(s): \"CKTOTAL\", \"CKMB\", \"CKMBINDEX\", \"TROPHS\" in the last 72

## 2025-04-23 DIAGNOSIS — G47.33 OSA (OBSTRUCTIVE SLEEP APNEA): ICD-10-CM

## 2025-04-23 DIAGNOSIS — I50.22 CHRONIC SYSTOLIC CONGESTIVE HEART FAILURE (HCC): ICD-10-CM

## 2025-04-23 DIAGNOSIS — I10 PRIMARY HYPERTENSION: ICD-10-CM

## 2025-04-23 DIAGNOSIS — I42.8 NONISCHEMIC CARDIOMYOPATHY (HCC): ICD-10-CM

## 2025-04-24 RX ORDER — HYDRALAZINE HYDROCHLORIDE 50 MG/1
50 TABLET, FILM COATED ORAL 3 TIMES DAILY
Qty: 90 TABLET | Refills: 11 | Status: SHIPPED | OUTPATIENT
Start: 2025-04-24

## 2025-06-02 RX ORDER — ATORVASTATIN CALCIUM 40 MG/1
40 TABLET, FILM COATED ORAL DAILY
Qty: 90 TABLET | Refills: 3 | Status: SHIPPED | OUTPATIENT
Start: 2025-06-02

## 2025-06-09 RX ORDER — SACUBITRIL AND VALSARTAN 97; 103 MG/1; MG/1
1 TABLET, FILM COATED ORAL 2 TIMES DAILY
Qty: 180 TABLET | Refills: 3 | Status: SHIPPED | OUTPATIENT
Start: 2025-06-09

## 2025-08-04 DIAGNOSIS — I50.22 CHRONIC SYSTOLIC CONGESTIVE HEART FAILURE (HCC): ICD-10-CM

## 2025-08-04 DIAGNOSIS — Z79.899 NEW MEDICATION ADDED: ICD-10-CM
